# Patient Record
Sex: MALE | Race: WHITE | Employment: OTHER | URBAN - METROPOLITAN AREA
[De-identification: names, ages, dates, MRNs, and addresses within clinical notes are randomized per-mention and may not be internally consistent; named-entity substitution may affect disease eponyms.]

---

## 2017-11-22 DIAGNOSIS — Z12.5 SPECIAL SCREENING FOR MALIGNANT NEOPLASM OF PROSTATE: ICD-10-CM

## 2017-12-05 ENCOUNTER — OFFICE VISIT (OUTPATIENT)
Dept: UROLOGY | Age: 57
End: 2017-12-05
Payer: COMMERCIAL

## 2017-12-05 VITALS
HEIGHT: 68 IN | TEMPERATURE: 98.1 F | HEART RATE: 71 BPM | WEIGHT: 174 LBS | BODY MASS INDEX: 26.37 KG/M2 | DIASTOLIC BLOOD PRESSURE: 66 MMHG | SYSTOLIC BLOOD PRESSURE: 100 MMHG

## 2017-12-05 DIAGNOSIS — Z12.5 ENCOUNTER FOR SCREENING FOR MALIGNANT NEOPLASM OF PROSTATE: ICD-10-CM

## 2017-12-05 DIAGNOSIS — Z80.42 FAMILY HISTORY OF MALIGNANT NEOPLASM OF PROSTATE IN FATHER: Primary | ICD-10-CM

## 2017-12-05 DIAGNOSIS — N52.9 ERECTILE DYSFUNCTION, UNSPECIFIED ERECTILE DYSFUNCTION TYPE: ICD-10-CM

## 2017-12-05 PROCEDURE — 99214 OFFICE O/P EST MOD 30 MIN: CPT | Performed by: UROLOGY

## 2017-12-05 RX ORDER — TADALAFIL 5 MG/1
5 TABLET ORAL PRN
Qty: 30 TABLET | Refills: 3 | Status: CANCELLED | OUTPATIENT
Start: 2017-12-05

## 2017-12-05 ASSESSMENT — ENCOUNTER SYMPTOMS
WHEEZING: 0
ABDOMINAL PAIN: 0
BACK PAIN: 1
COLOR CHANGE: 0
VOMITING: 0
EYE PAIN: 0
NAUSEA: 0
SHORTNESS OF BREATH: 0
COUGH: 0
EYE REDNESS: 1

## 2017-12-05 NOTE — PROGRESS NOTES
°C)     Body mass index is 26.46 kg/m². Patient is a 59 y.o. male in no acute distress and alert and oriented to person, place and time. Physical Exam  Constitutional: Patient in no acute distress. Neuro: Alert and oriented to person, place and time. Psych: Mood normal, affect normal  Lungs: Respiratory effort is normal  Cardiovascular: Warm & Pink  Abdomen: Soft, non-tender, non-distended with no CVA,  No flank tenderness,  Or hepatosplenomegaly   Lymphatics: No palpable lymphadenopathy. Bladder non-tender and not distended. Musculoskeletal: Normal gait and station  Testiscles: Normal, bilaterally  Prostate:  30 grams, smooth, no nodules. Assessment and Plan      1. Family history of malignant neoplasm of prostate in father    2. Erectile dysfunction, unspecified erectile dysfunction type    3. Encounter for screening for malignant neoplasm of prostate           Plan:         Doing well  Sildenafil script provided. PSA/CLIFFORD yearly, given family history of prostate cancer. No Follow-up on file. Prescriptions Ordered:  No orders of the defined types were placed in this encounter. Orders Placed:  No orders of the defined types were placed in this encounter.          Perla Mayo MD

## 2018-12-13 ENCOUNTER — TELEPHONE (OUTPATIENT)
Dept: UROLOGY | Age: 58
End: 2018-12-13

## 2018-12-13 DIAGNOSIS — Z12.5 SCREENING PSA (PROSTATE SPECIFIC ANTIGEN): Primary | ICD-10-CM

## 2019-01-23 ENCOUNTER — OFFICE VISIT (OUTPATIENT)
Dept: ORTHOPEDIC SURGERY | Age: 59
End: 2019-01-23
Payer: COMMERCIAL

## 2019-01-23 VITALS — BODY MASS INDEX: 26.52 KG/M2 | WEIGHT: 175 LBS | HEIGHT: 68 IN

## 2019-01-23 DIAGNOSIS — M25.512 CHRONIC LEFT SHOULDER PAIN: Primary | ICD-10-CM

## 2019-01-23 DIAGNOSIS — G89.29 CHRONIC LEFT SHOULDER PAIN: Primary | ICD-10-CM

## 2019-01-23 DIAGNOSIS — M75.42 IMPINGEMENT SYNDROME OF LEFT SHOULDER: ICD-10-CM

## 2019-01-23 PROCEDURE — 20610 DRAIN/INJ JOINT/BURSA W/O US: CPT | Performed by: ORTHOPAEDIC SURGERY

## 2019-01-23 PROCEDURE — 99203 OFFICE O/P NEW LOW 30 MIN: CPT | Performed by: ORTHOPAEDIC SURGERY

## 2019-01-23 RX ORDER — BUPIVACAINE HYDROCHLORIDE 5 MG/ML
2 INJECTION, SOLUTION PERINEURAL ONCE
Status: COMPLETED | OUTPATIENT
Start: 2019-01-23 | End: 2019-01-23

## 2019-01-23 RX ORDER — LIDOCAINE HYDROCHLORIDE 10 MG/ML
2 INJECTION, SOLUTION EPIDURAL; INFILTRATION; INTRACAUDAL; PERINEURAL ONCE
Status: COMPLETED | OUTPATIENT
Start: 2019-01-23 | End: 2019-01-23

## 2019-01-23 RX ORDER — BETAMETHASONE SODIUM PHOSPHATE AND BETAMETHASONE ACETATE 3; 3 MG/ML; MG/ML
12 INJECTION, SUSPENSION INTRA-ARTICULAR; INTRALESIONAL; INTRAMUSCULAR; SOFT TISSUE ONCE
Status: COMPLETED | OUTPATIENT
Start: 2019-01-23 | End: 2019-01-23

## 2019-01-23 RX ADMIN — BUPIVACAINE HYDROCHLORIDE 10 MG: 5 INJECTION, SOLUTION PERINEURAL at 15:43

## 2019-01-23 RX ADMIN — LIDOCAINE HYDROCHLORIDE 2 ML: 10 INJECTION, SOLUTION EPIDURAL; INFILTRATION; INTRACAUDAL; PERINEURAL at 15:44

## 2019-01-23 RX ADMIN — BETAMETHASONE SODIUM PHOSPHATE AND BETAMETHASONE ACETATE 12 MG: 3; 3 INJECTION, SUSPENSION INTRA-ARTICULAR; INTRALESIONAL; INTRAMUSCULAR; SOFT TISSUE at 15:43

## 2019-02-12 ENCOUNTER — OFFICE VISIT (OUTPATIENT)
Dept: UROLOGY | Age: 59
End: 2019-02-12
Payer: COMMERCIAL

## 2019-02-12 VITALS
SYSTOLIC BLOOD PRESSURE: 122 MMHG | WEIGHT: 180 LBS | TEMPERATURE: 98.2 F | HEART RATE: 72 BPM | BODY MASS INDEX: 27.28 KG/M2 | DIASTOLIC BLOOD PRESSURE: 78 MMHG | HEIGHT: 68 IN

## 2019-02-12 DIAGNOSIS — N52.01 ERECTILE DYSFUNCTION DUE TO ARTERIAL INSUFFICIENCY: Primary | ICD-10-CM

## 2019-02-12 DIAGNOSIS — Z12.5 PROSTATE CANCER SCREENING: ICD-10-CM

## 2019-02-12 PROCEDURE — 99214 OFFICE O/P EST MOD 30 MIN: CPT | Performed by: UROLOGY

## 2019-02-12 RX ORDER — TADALAFIL 20 MG/1
20 TABLET ORAL PRN
Qty: 10 TABLET | Refills: 5 | Status: SHIPPED | OUTPATIENT
Start: 2019-02-12 | End: 2019-08-23

## 2019-02-12 ASSESSMENT — ENCOUNTER SYMPTOMS
ABDOMINAL PAIN: 0
COLOR CHANGE: 0
CONSTIPATION: 0
DIARRHEA: 0
WHEEZING: 0
BACK PAIN: 0
NAUSEA: 0
EYE REDNESS: 0
EYE PAIN: 0
SHORTNESS OF BREATH: 0
COUGH: 0
VOMITING: 0

## 2019-08-11 LAB — PSA, ULTRASENSITIVE: 2.6 NG/ML (ref 0–4)

## 2019-08-23 ENCOUNTER — OFFICE VISIT (OUTPATIENT)
Dept: UROLOGY | Age: 59
End: 2019-08-23
Payer: COMMERCIAL

## 2019-08-23 VITALS
TEMPERATURE: 98.1 F | DIASTOLIC BLOOD PRESSURE: 60 MMHG | HEIGHT: 68 IN | WEIGHT: 179 LBS | SYSTOLIC BLOOD PRESSURE: 112 MMHG | BODY MASS INDEX: 27.13 KG/M2

## 2019-08-23 DIAGNOSIS — N52.01 ERECTILE DYSFUNCTION DUE TO ARTERIAL INSUFFICIENCY: Primary | ICD-10-CM

## 2019-08-23 DIAGNOSIS — R97.20 RISING PSA LEVEL: ICD-10-CM

## 2019-08-23 DIAGNOSIS — Z80.42 FAMILY HISTORY OF PROSTATE CANCER: ICD-10-CM

## 2019-08-23 PROCEDURE — 99214 OFFICE O/P EST MOD 30 MIN: CPT | Performed by: UROLOGY

## 2019-08-23 ASSESSMENT — ENCOUNTER SYMPTOMS
BACK PAIN: 0
NAUSEA: 0
EYE REDNESS: 0
WHEEZING: 0
VOMITING: 0
SHORTNESS OF BREATH: 0
ABDOMINAL PAIN: 0
EYE PAIN: 0
COUGH: 0
COLOR CHANGE: 0

## 2019-08-23 NOTE — PROGRESS NOTES
[facility]  [facility] UROLOGY SPECIALISTS - [facility]  Saqibdavid Fraser [address] [place] [id number]  Dept:  Madhuri Junior [facility] Urology Office Note - Established    Patient:  Sharon Benavides  YOB: 1960  Date: 8/23/2019    The patient is a 62 y.o. male who presents todayfor evaluation of the following problems:   Chief Complaint   Patient presents with    Elevated PSA     6 mo results     Erectile Dysfunction     wants viagra        HPI  He is here in follow up for PSA. He had a PSA of 1.22 2 years ago. His PSA has been rising slightly and is now up to 2.6, so his PSA has doubled in 2 years. His half brother had prostate cancer. He has used Cialis in the past, which worked. He has not used it recently. Summary of old records: N/A    Additional History: N/A    Procedures Today: N/A    Urinalysis today:  No results found for this visit on 08/23/19. Last several PSA's:  No results found for: PSA  Last total testosterone:  No results found for: TESTOSTERONE    AUA Symptom Score (8/23/2019):  INCOMPLETE EMPTYING: How often have you had the sensation of not emptying your bladder?: Not at all  FREQUENCY: How often do you have to urinate less than every two hours?: Not at all  INTERMITTENCY: How often have you found you stopped and started again several times when you urinated?: Not at all  URGENCY: How often have you found it difficult to postpone urination?: Not at all  WEAK STREAM: How often have you had a weak urinary stream?: Not at all  STRAINING: How often have you had to strain to start  urination?: Not at all  NOCTURIA: How many times did you typically get up at night to uriniate?: 1 Time  TOTAL I-PSS SCORE[de-identified] 1  How would you feel if you were to spend the rest of your life with your urinary condition?: Pleased    Last BUN and creatinine:  No results found for: BUN  No results found for: CREATININE    Additional Lab/Culture results: psa has gone up slightly.      Imaging Reviewed

## 2019-08-23 NOTE — LETTER
[facility]  [facility] UROLOGY SPECIALISTS - [address]  Dept: [phone]  Dept Fax: [phone]        8/23/19    Patient: Julien Oates  YOB: 1960    Dear Nancylee Harada,    I had the pleasure of seeing one of your patients, Rere Forte today in the office today. Below are the relevant portions of my assessment and plan of care. IMPRESSION:  1. Erectile dysfunction due to arterial insufficiency    2. Family history of prostate cancer    3. Rising PSA level        PLAN:  Doing well  PSA has gone up again. Will re-check in 6 months. If it goes up again, may need biopsy vs further testing. Cialis script given. No follow-ups on file. Prescriptions Ordered:  No orders of the defined types were placed in this encounter. Orders Placed:  No orders of the defined types were placed in this encounter. Thank you for allowing me to participate in the care of this patient. I will keep you updated on this patient's follow up and I look forward to serving you and your patients again in the future.         Kennedy Stanford MD

## 2020-01-24 ENCOUNTER — OFFICE VISIT (OUTPATIENT)
Dept: UROLOGY | Age: 60
End: 2020-01-24
Payer: COMMERCIAL

## 2020-01-24 ENCOUNTER — HOSPITAL ENCOUNTER (OUTPATIENT)
Age: 60
Setting detail: SPECIMEN
Discharge: HOME | End: 2020-01-24
Payer: COMMERCIAL

## 2020-01-24 VITALS
HEIGHT: 68 IN | BODY MASS INDEX: 27.31 KG/M2 | TEMPERATURE: 98.6 F | WEIGHT: 180.2 LBS | SYSTOLIC BLOOD PRESSURE: 106 MMHG | DIASTOLIC BLOOD PRESSURE: 60 MMHG

## 2020-01-24 PROCEDURE — 99214 OFFICE O/P EST MOD 30 MIN: CPT | Performed by: UROLOGY

## 2020-01-24 RX ORDER — TRAMADOL HYDROCHLORIDE 50 MG/1
TABLET ORAL
COMMUNITY
Start: 2019-12-30 | End: 2021-05-26 | Stop reason: ALTCHOICE

## 2020-01-24 RX ORDER — TADALAFIL 5 MG/1
TABLET ORAL
COMMUNITY
Start: 2019-12-27 | End: 2020-08-05 | Stop reason: SDUPTHER

## 2020-01-24 RX ORDER — ZOLPIDEM TARTRATE 10 MG/1
TABLET ORAL
Status: ON HOLD | COMMUNITY
Start: 2019-12-30 | End: 2020-03-05 | Stop reason: HOSPADM

## 2020-01-24 ASSESSMENT — ENCOUNTER SYMPTOMS
NAUSEA: 0
BACK PAIN: 0
COUGH: 0
WHEEZING: 0
SHORTNESS OF BREATH: 0
EYE PAIN: 0
COLOR CHANGE: 0
VOMITING: 0
EYE REDNESS: 0
ABDOMINAL PAIN: 0

## 2020-01-24 NOTE — PROGRESS NOTES
[facility]  [facility] UROLOGY SPECIALISTS - [facility]  Loyal Magnus [address] [place] [phone]  Dept: 92 Madhuri Junior [facility] Urology Office Note - Established    Patient:  Marcus So  YOB: 1960  Date: 1/24/2020    The patient is a 62 y.o. male who presents todayfor evaluation of the following problems:   Chief Complaint   Patient presents with    Elevated PSA     6mo psa        HPI  He is here for elevated PSA. He had his PSA double from 1.3 to 2.6 6 months ago. His PSA has now doubled again to 5.3. He has never had a prostate biopsy. He has never had a prostate infection. He is voiding well. His half brother had prostate cancer. Summary of old records: N/A    Additional History: N/A    Procedures Today: N/A    Urinalysis today:  No results found for this visit on 01/24/20.   Last several PSA's:  No results found for: PSA  Last total testosterone:  No results found for: TESTOSTERONE    AUA Symptom Score (1/24/2020):  INCOMPLETE EMPTYING: How often have you had the sensation of not emptying your bladder?: Not at all  FREQUENCY: How often do you have to urinate less than every two hours?: Not at all  INTERMITTENCY: How often have you found you stopped and started again several times when you urinated?: Not at all  URGENCY: How often have you found it difficult to postpone urination?: Not at all  WEAK STREAM: How often have you had a weak urinary stream?: Not at all  STRAINING: How often have you had to strain to start  urination?: Not at all  NOCTURIA: How many times did you typically get up at night to uriniate?: 1 Time  TOTAL I-PSS SCORE[de-identified] 1  How would you feel if you were to spend the rest of your life with your urinary condition?: Pleased    Last BUN and creatinine:  No results found for: BUN  No results found for: CREATININE    Additional Lab/Culture results: none    Imaging Reviewed during this Office Visit: none  (results were independently reviewed by physician and

## 2020-01-29 LAB
FLUOROQUINOLONE RESISTANT ORG, CULT: NORMAL
ZZ NTE WITH NAME CLEAN UP: SPECIMEN SOURCE: NORMAL

## 2020-01-29 RX ORDER — CIPROFLOXACIN 500 MG/1
500 TABLET, FILM COATED ORAL 2 TIMES DAILY
Qty: 6 TABLET | Refills: 0 | Status: SHIPPED | OUTPATIENT
Start: 2020-01-29 | End: 2020-02-01

## 2020-02-07 ENCOUNTER — PROCEDURE VISIT (OUTPATIENT)
Dept: UROLOGY | Age: 60
End: 2020-02-07
Payer: COMMERCIAL

## 2020-02-07 ENCOUNTER — HOSPITAL ENCOUNTER (OUTPATIENT)
Age: 60
Setting detail: SPECIMEN
Discharge: HOME | End: 2020-02-07
Payer: COMMERCIAL

## 2020-02-07 VITALS
SYSTOLIC BLOOD PRESSURE: 100 MMHG | WEIGHT: 180.12 LBS | HEIGHT: 68 IN | TEMPERATURE: 98.7 F | BODY MASS INDEX: 27.3 KG/M2 | DIASTOLIC BLOOD PRESSURE: 80 MMHG

## 2020-02-07 PROCEDURE — 55700 PR BIOPSY OF PROSTATE,NEEDLE/PUNCH: CPT | Performed by: UROLOGY

## 2020-02-07 PROCEDURE — 76872 US TRANSRECTAL: CPT | Performed by: UROLOGY

## 2020-02-07 NOTE — PROGRESS NOTES
Elevated PSA:  Patient is here today for history of an elevated PSA. PROSTATE U/S AND BIOPSY  Risks and Benefits discussed with patient prior to procedure. As per my instructions, the patient took an antibiotic 1 hour prior to this procedure. He also had a Fleets enema. Surgeon: Vernon Caban MD   2/7/20  Indications for exam: Elevated PSA  Anesthesia: 1% Lidocaine periprostatic block bilaterally at junction of base of prostate and seminal vesicle. Ultrasound Findings:  Seminal Vesicles: intact bilaterally  Prostate Measurement: 27 grams  Central Zone: Normal echogenic structure  Transitional Zone: Normal echogenic structure  Peripheral Zone: Normal echogenic structure  Bladder: Minimal postvoid residual  Biopsy: Ultrasound guidance used to obtain biopsy cores were taken from each lobe in a sequential fashion. From 6 quadrants all were taken from the right 6 quadrants were taken from the left. All specimens were sent for pathological examination. Biopsy is transrectal with transrectal US done    Postop medications: Cipro 500 mg po bid for 3 more days. Recommendations: Patient has appointment in the office to review results. Postop Prostate Biopsy Instructions:  Patient told to drink plenty of fluids and to take it easy the day of the prostate biopsy. He was encouraged to use sitz baths as needed for relief of rectal discomfort after the biopsy. He was told he may notice blood or a rust color in his semen for several months after the biopsy. He was told to avoid resuming blood thinners or aspirin until the rectal bleeding or visible blood in urine has stopped for at least 48 hours. He should take his antibiotics to completion as prescribed. He was instructed to call our office immediately or to go to the Emergency Room if he experiences a fever over 101, shaking chills or an inability to urinate. Agree with the ROS entered by the MA.

## 2020-02-13 LAB — SURGICAL PATHOLOGY REPORT: NORMAL

## 2020-02-18 ENCOUNTER — OFFICE VISIT (OUTPATIENT)
Dept: UROLOGY | Age: 60
End: 2020-02-18
Payer: COMMERCIAL

## 2020-02-18 VITALS
DIASTOLIC BLOOD PRESSURE: 87 MMHG | HEART RATE: 81 BPM | BODY MASS INDEX: 27.37 KG/M2 | HEIGHT: 68 IN | WEIGHT: 180.6 LBS | SYSTOLIC BLOOD PRESSURE: 122 MMHG | TEMPERATURE: 98 F

## 2020-02-18 PROCEDURE — 99214 OFFICE O/P EST MOD 30 MIN: CPT | Performed by: UROLOGY

## 2020-02-18 ASSESSMENT — ENCOUNTER SYMPTOMS
ABDOMINAL PAIN: 0
DIARRHEA: 0
NAUSEA: 0
EYE PAIN: 0
WHEEZING: 0
EYE REDNESS: 0
CONSTIPATION: 0
COUGH: 0
VOMITING: 0
BACK PAIN: 0
SHORTNESS OF BREATH: 0

## 2020-02-18 NOTE — LETTER
[facility]  [facility] UROLOGY SPECIALISTS - [address]  Dept: [phone]  Dept Fax: [phone]        2/18/20    Patient: Jennifer Parsons  YOB: 1960    Dear Xiomy Goldsmith,    I had the pleasure of seeing one of your patients, Rubia Montanez today in the office today. Below are the relevant portions of my assessment and plan of care. IMPRESSION:  1. Elevated PSA    2. Family history of prostate cancer    3. Prostate cancer [facility])        PLAN:  He was found to have Intermediate risk prostate cancer, Grade group 3. Would recommend treatment. We discussed his options and he would prefer radical prostatectomy. No metastatic evaluation required. Prescriptions Ordered:  No orders of the defined types were placed in this encounter. Orders Placed:  No orders of the defined types were placed in this encounter. Thank you for allowing me to participate in the care of this patient. I will keep you updated on this patient's follow up and I look forward to serving you and your patients again in the future.         Salina Hanna MD

## 2020-02-27 ENCOUNTER — OFFICE VISIT (OUTPATIENT)
Dept: UROLOGY | Age: 60
End: 2020-02-27
Payer: COMMERCIAL

## 2020-02-27 ENCOUNTER — TELEPHONE (OUTPATIENT)
Dept: UROLOGY | Age: 60
End: 2020-02-27

## 2020-02-27 VITALS
HEIGHT: 68 IN | WEIGHT: 178.6 LBS | DIASTOLIC BLOOD PRESSURE: 84 MMHG | BODY MASS INDEX: 27.07 KG/M2 | TEMPERATURE: 98.3 F | SYSTOLIC BLOOD PRESSURE: 118 MMHG

## 2020-02-27 PROCEDURE — 99214 OFFICE O/P EST MOD 30 MIN: CPT | Performed by: UROLOGY

## 2020-02-27 ASSESSMENT — ENCOUNTER SYMPTOMS
EYE REDNESS: 0
DIARRHEA: 0
BACK PAIN: 0
NAUSEA: 0
CONSTIPATION: 0
SHORTNESS OF BREATH: 0
ABDOMINAL PAIN: 0
EYE PAIN: 0
COUGH: 0
VOMITING: 0
WHEEZING: 0

## 2020-02-27 NOTE — PROGRESS NOTES
tenderness,  Or hepatosplenomegaly       Assessment and Plan      1. Prostate cancer ([address]    2. Elevated PSA           Plan:     robo prostate with plnd  Do standard and nodes  Cat 1  Discussed for 27 min  Return for Surgery. Prescriptions Ordered:  No orders of the defined types were placed in this encounter. Orders Placed:  No orders of the defined types were placed in this encounter. Joe Carty MD    Agree with the ROS entered by the MA.

## 2020-02-28 ENCOUNTER — HOSPITAL ENCOUNTER (OUTPATIENT)
Dept: PREADMISSION TESTING | Age: 60
Discharge: HOME | End: 2020-03-03
Payer: COMMERCIAL

## 2020-02-28 VITALS
WEIGHT: 179.01 LBS | BODY MASS INDEX: 27.13 KG/M2 | OXYGEN SATURATION: 98 % | DIASTOLIC BLOOD PRESSURE: 83 MMHG | SYSTOLIC BLOOD PRESSURE: 149 MMHG | HEIGHT: 68 IN | RESPIRATION RATE: 20 BRPM | HEART RATE: 92 BPM | TEMPERATURE: 97.7 F

## 2020-02-28 LAB
ABO/RH: NORMAL
ANION GAP SERPL CALCULATED.3IONS-SCNC: 13 MMOL/L (ref 9–17)
ANTIBODY SCREEN: NEGATIVE
ARM BAND NUMBER: NORMAL
BUN BLDV-MCNC: 18 MG/DL (ref 8–23)
CHLORIDE BLD-SCNC: 104 MMOL/L (ref 98–107)
CO2: 22 MMOL/L (ref 20–31)
CREAT SERPL-MCNC: 0.92 MG/DL (ref 0.7–1.2)
EXPIRATION DATE: NORMAL
GFR AFRICAN AMERICAN: >60 ML/MIN
GFR NON-AFRICAN AMERICAN: >60 ML/MIN
GFR SERPL CREATININE-BSD FRML MDRD: NORMAL ML/MIN/{1.73_M2}
GFR SERPL CREATININE-BSD FRML MDRD: NORMAL ML/MIN/{1.73_M2}
GLUCOSE BLD-MCNC: 142 MG/DL (ref 70–99)
HCT VFR BLD CALC: 42.2 % (ref 40.7–50.3)
HEMOGLOBIN: 13.9 G/DL (ref 13–17)
MCH RBC QN AUTO: 29.4 PG (ref 25.2–33.5)
MCHC RBC AUTO-ENTMCNC: 32.9 G/DL (ref 28.4–34.8)
MCV RBC AUTO: 89.4 FL (ref 82.6–102.9)
NRBC AUTOMATED: 0 PER 100 WBC
PDW BLD-RTO: 12.4 % (ref 11.8–14.4)
PLATELET # BLD: 252 K/UL (ref 138–453)
PMV BLD AUTO: 10.5 FL (ref 8.1–13.5)
POTASSIUM SERPL-SCNC: 3.9 MMOL/L (ref 3.7–5.3)
RBC # BLD: 4.72 M/UL (ref 4.21–5.77)
SODIUM BLD-SCNC: 139 MMOL/L (ref 135–144)
WBC # BLD: 8 K/UL (ref 3.5–11.3)

## 2020-02-28 PROCEDURE — 87086 URINE CULTURE/COLONY COUNT: CPT

## 2020-02-28 PROCEDURE — 85027 COMPLETE CBC AUTOMATED: CPT

## 2020-02-28 PROCEDURE — 86900 BLOOD TYPING SEROLOGIC ABO: CPT

## 2020-02-28 PROCEDURE — 36415 COLL VENOUS BLD VENIPUNCTURE: CPT

## 2020-02-28 PROCEDURE — 80051 ELECTROLYTE PANEL: CPT

## 2020-02-28 PROCEDURE — 86901 BLOOD TYPING SEROLOGIC RH(D): CPT

## 2020-02-28 PROCEDURE — 82947 ASSAY GLUCOSE BLOOD QUANT: CPT

## 2020-02-28 PROCEDURE — 86850 RBC ANTIBODY SCREEN: CPT

## 2020-02-28 PROCEDURE — 84520 ASSAY OF UREA NITROGEN: CPT

## 2020-02-28 PROCEDURE — 82565 ASSAY OF CREATININE: CPT

## 2020-02-28 RX ORDER — SODIUM CHLORIDE, SODIUM LACTATE, POTASSIUM CHLORIDE, CALCIUM CHLORIDE 600; 310; 30; 20 MG/100ML; MG/100ML; MG/100ML; MG/100ML
1000 INJECTION, SOLUTION INTRAVENOUS CONTINUOUS
Status: CANCELLED | OUTPATIENT
Start: 2020-02-28

## 2020-02-28 RX ORDER — OMEPRAZOLE 20 MG/1
20 CAPSULE, DELAYED RELEASE ORAL DAILY
COMMUNITY

## 2020-02-28 NOTE — PROGRESS NOTES
Anesthesia Focused Assessment    STOP-BANG Sleep Apnea Questionnaire    SNORE loudly (heard through closed doors)? No  TIRED, fatigued, sleepy during daytime? No  OBSERVED stopping breathing during sleep? No  High blood PRESSURE being treated? No    BMI over 35? No  AGE over 48? Yes  NECK circumference over 16\"? No  GENDER (male)? Yes             Total 2  High risk 5-8  Intermediate risk 3-4  Low risk 0-2    Obstructive Sleep Apnea: denies  If YES, machine used: no     Type 1 DM:   no  T2DM:  no    Coronary Artery Disease:  no  Hypertension:  no    Active smoker:  no  Drinks Alcohol:  no    Dentition: molar crown    Defib / AICD / Pacemaker: no      Renal Failure/dialysis:  no    Patient was evaluated in PAT & anesthesia guidelines were applied. NPO guidelines, medication instructions and scheduled arrival time were reviewed with patient.     Hx of anesthesia complications:  no  Family hx of anesthesia complications:  no                                                                                                                     Anesthesia contacted:   no  Medical or cardiac clearance ordered: no    LANDON LAWRENCE PA-C  2/28/20  1:20 PM

## 2020-02-28 NOTE — H&P
History and Physical    Pt Name: Enrique Arguelles  MRN: [id number]  YOB: 1960  Date of evaluation: 2/28/2020    SUBJECTIVE:   History of Chief Complaint:    Patient complains of prostate cancer. He has regular PSA levels drawn, family history of prostate cancer in brothers. He says that the PSA had risen, was sent for biopsy. This was positive for cancer, scheduled for prostatectomy. Past Medical History    has a past medical history of Colon polyps, Diverticulitis, Hyperlipidemia, Prostate cancer [facility]), Wears prescription eyeglasses, and Wellness examination. Past Surgical History   has a past surgical history that includes Rotator cuff repair; Prostate Biopsy (N/A); and Colonoscopy (2012, 2017). Medications  Prior to Admission medications    Medication Sig Start Date End Date Taking? Authorizing Provider   omeprazole (PRILOSEC) 20 MG delayed release capsule Take 20 mg by mouth Daily   Yes Historical Provider, MD   sertraline (ZOLOFT) 50 MG tablet Take 50 mg by mouth daily  12/30/19   Historical Provider, MD   tadalafil (CIALIS) 5 MG tablet TAKE 1 TABLET BY MOUTH ONCE DAILY AS NEEDED 12/27/19   Historical Provider, MD   traMADol Leitha Mill) 50 MG tablet  12/30/19   Historical Provider, MD   zolpidem (AMBIEN) 10 MG tablet  12/30/19   Historical Provider, MD   vitamin D 25 MCG (1000 UT) CAPS Take by mouth    Historical Provider, MD   fenofibrate 160 MG tablet Take 160 mg by mouth daily    Historical Provider, MD   b complex vitamins capsule Take 1 capsule by mouth daily    Historical Provider, MD   Ascorbic Acid (VITAMIN C) 500 MG tablet Take 500 mg by mouth daily    Historical Provider, MD   psyllium (METAMUCIL) 0.52 G capsule Take 0.52 g by mouth daily    Historical Provider, MD   aspirin 81 MG tablet Take 81 mg by mouth daily    Historical Provider, MD     Allergies  is allergic to sulfamethoxazole.   Family History  family history includes COPD in his father; Colon Cancer in his mother; Heart Attack in his father; Heart Disease in his father; Prostate Cancer in his brother. Social History   reports that he has never smoked. He has never used smokeless tobacco.   reports no history of alcohol use. reports no history of drug use. Marital Status     OBJECTIVE:   VITALS:  height is 5' 8\" (1.727 m) and weight is 179 lb 0.2 oz (81.2 kg). His temporal temperature is 97.7 °F (36.5 °C). His blood pressure is 149/83 (abnormal) and his pulse is 92. His respiration is 20 and oxygen saturation is 98%. CONSTITUTIONAL:alert & oriented x 3, no acute distress. Very pleasant. SKIN:  Warm and dry, no rashes on exposed areas of skin   HEAD:  Normocephalic, atraumatic   EYES: PERRL. EOMs intact. Wearing glasses. EARS:  Hearing grossly WNL. NOSE:  Nares patent. No rhinorrhea   MOUTH/THROAT:  benign  NECK:supple, no lymphadenopathy  LUNGS: Clear to auscultation bilaterally, no wheezes. CARDIOVASCULAR: Heart sounds are normal.  Regular rate and rhythm without murmur. ABDOMEN: soft, non tender, non distended. EXTREMITIES: no edema bilateral lower extremities. Testing:   EK20  Labs pending: drawn 2020     IMPRESSIONS:   1. Prostate cancer  2.  has a past medical history of Colon polyps, Diverticulitis, Hyperlipidemia, Prostate cancer [facility]), Wears prescription eyeglasses, and Wellness examination.    PLANS:   1. prostatectomy    LANDON LAWRENCE PA-C  Electronically signed 2020 at 2:01 PM

## 2020-02-29 LAB
CULTURE: NO GROWTH
Lab: NORMAL
SPECIMEN DESCRIPTION: NORMAL

## 2020-03-04 ENCOUNTER — HOSPITAL ENCOUNTER (INPATIENT)
Age: 60
LOS: 1 days | Discharge: HOME | DRG: 708 | End: 2020-03-05
Attending: UROLOGY | Admitting: UROLOGY
Payer: COMMERCIAL

## 2020-03-04 ENCOUNTER — ANESTHESIA EVENT (OUTPATIENT)
Dept: OPERATING ROOM | Age: 60
DRG: 708 | End: 2020-03-04
Payer: COMMERCIAL

## 2020-03-04 ENCOUNTER — ANESTHESIA (OUTPATIENT)
Dept: OPERATING ROOM | Age: 60
DRG: 708 | End: 2020-03-04
Payer: COMMERCIAL

## 2020-03-04 VITALS — OXYGEN SATURATION: 100 % | TEMPERATURE: 98.3 F | DIASTOLIC BLOOD PRESSURE: 134 MMHG | SYSTOLIC BLOOD PRESSURE: 150 MMHG

## 2020-03-04 PROBLEM — C61 PROSTATE CANCER (HCC): Status: ACTIVE | Noted: 2020-03-04

## 2020-03-04 LAB
ANION GAP SERPL CALCULATED.3IONS-SCNC: 13 MMOL/L (ref 9–17)
BUN BLDV-MCNC: 13 MG/DL (ref 8–23)
BUN/CREAT BLD: ABNORMAL (ref 9–20)
CALCIUM SERPL-MCNC: 8.6 MG/DL (ref 8.6–10.4)
CHLORIDE BLD-SCNC: 104 MMOL/L (ref 98–107)
CO2: 19 MMOL/L (ref 20–31)
CREAT SERPL-MCNC: 0.84 MG/DL (ref 0.7–1.2)
GFR AFRICAN AMERICAN: >60 ML/MIN
GFR NON-AFRICAN AMERICAN: >60 ML/MIN
GFR SERPL CREATININE-BSD FRML MDRD: ABNORMAL ML/MIN/{1.73_M2}
GFR SERPL CREATININE-BSD FRML MDRD: ABNORMAL ML/MIN/{1.73_M2}
GLUCOSE BLD-MCNC: 177 MG/DL (ref 70–99)
HCT VFR BLD CALC: 39.5 % (ref 40.7–50.3)
HEMOGLOBIN: 13.2 G/DL (ref 13–17)
MCH RBC QN AUTO: 29.9 PG (ref 25.2–33.5)
MCHC RBC AUTO-ENTMCNC: 33.4 G/DL (ref 28.4–34.8)
MCV RBC AUTO: 89.6 FL (ref 82.6–102.9)
NRBC AUTOMATED: 0 PER 100 WBC
PDW BLD-RTO: 12.4 % (ref 11.8–14.4)
PLATELET # BLD: 232 K/UL (ref 138–453)
PMV BLD AUTO: 10.5 FL (ref 8.1–13.5)
POTASSIUM SERPL-SCNC: 3.9 MMOL/L (ref 3.7–5.3)
RBC # BLD: 4.41 M/UL (ref 4.21–5.77)
SODIUM BLD-SCNC: 136 MMOL/L (ref 135–144)
WBC # BLD: 16.4 K/UL (ref 3.5–11.3)

## 2020-03-04 PROCEDURE — 6360000002 HC RX W HCPCS: Performed by: NURSE ANESTHETIST, CERTIFIED REGISTERED

## 2020-03-04 PROCEDURE — 3600000019 HC SURGERY ROBOT ADDTL 15MIN: Performed by: UROLOGY

## 2020-03-04 PROCEDURE — 88307 TISSUE EXAM BY PATHOLOGIST: CPT

## 2020-03-04 PROCEDURE — 6360000002 HC RX W HCPCS: Performed by: PHYSICIAN ASSISTANT

## 2020-03-04 PROCEDURE — 0VT04ZZ RESECTION OF PROSTATE, PERCUTANEOUS ENDOSCOPIC APPROACH: ICD-10-PCS | Performed by: UROLOGY

## 2020-03-04 PROCEDURE — 85027 COMPLETE CBC AUTOMATED: CPT

## 2020-03-04 PROCEDURE — 3700000001 HC ADD 15 MINUTES (ANESTHESIA): Performed by: UROLOGY

## 2020-03-04 PROCEDURE — 88309 TISSUE EXAM BY PATHOLOGIST: CPT

## 2020-03-04 PROCEDURE — 2580000003 HC RX 258: Performed by: NURSE ANESTHETIST, CERTIFIED REGISTERED

## 2020-03-04 PROCEDURE — 80048 BASIC METABOLIC PNL TOTAL CA: CPT

## 2020-03-04 PROCEDURE — 6360000002 HC RX W HCPCS: Performed by: ANESTHESIOLOGY

## 2020-03-04 PROCEDURE — 7100000001 HC PACU RECOVERY - ADDTL 15 MIN: Performed by: UROLOGY

## 2020-03-04 PROCEDURE — 3700000000 HC ANESTHESIA ATTENDED CARE: Performed by: UROLOGY

## 2020-03-04 PROCEDURE — 6370000000 HC RX 637 (ALT 250 FOR IP): Performed by: UROLOGY

## 2020-03-04 PROCEDURE — 07BC4ZZ EXCISION OF PELVIS LYMPHATIC, PERCUTANEOUS ENDOSCOPIC APPROACH: ICD-10-PCS | Performed by: UROLOGY

## 2020-03-04 PROCEDURE — S2900 ROBOTIC SURGICAL SYSTEM: HCPCS | Performed by: UROLOGY

## 2020-03-04 PROCEDURE — 6370000000 HC RX 637 (ALT 250 FOR IP): Performed by: PHYSICIAN ASSISTANT

## 2020-03-04 PROCEDURE — 2709999900 HC NON-CHARGEABLE SUPPLY: Performed by: UROLOGY

## 2020-03-04 PROCEDURE — 87086 URINE CULTURE/COLONY COUNT: CPT

## 2020-03-04 PROCEDURE — 1200000000 HC SEMI PRIVATE

## 2020-03-04 PROCEDURE — 8E0W4CZ ROBOTIC ASSISTED PROCEDURE OF TRUNK REGION, PERCUTANEOUS ENDOSCOPIC APPROACH: ICD-10-PCS | Performed by: UROLOGY

## 2020-03-04 PROCEDURE — 2580000003 HC RX 258: Performed by: UROLOGY

## 2020-03-04 PROCEDURE — 2580000003 HC RX 258: Performed by: ANESTHESIOLOGY

## 2020-03-04 PROCEDURE — 2500000003 HC RX 250 WO HCPCS: Performed by: UROLOGY

## 2020-03-04 PROCEDURE — 7100000000 HC PACU RECOVERY - FIRST 15 MIN: Performed by: UROLOGY

## 2020-03-04 PROCEDURE — 2500000003 HC RX 250 WO HCPCS: Performed by: NURSE ANESTHETIST, CERTIFIED REGISTERED

## 2020-03-04 PROCEDURE — 2580000003 HC RX 258: Performed by: PHYSICIAN ASSISTANT

## 2020-03-04 PROCEDURE — 6370000000 HC RX 637 (ALT 250 FOR IP): Performed by: STUDENT IN AN ORGANIZED HEALTH CARE EDUCATION/TRAINING PROGRAM

## 2020-03-04 PROCEDURE — 3600000009 HC SURGERY ROBOT BASE: Performed by: UROLOGY

## 2020-03-04 PROCEDURE — 2720000010 HC SURG SUPPLY STERILE: Performed by: UROLOGY

## 2020-03-04 RX ORDER — WATER 1000 ML/1000ML
INJECTION, SOLUTION INTRAVENOUS PRN
Status: DISCONTINUED | OUTPATIENT
Start: 2020-03-04 | End: 2020-03-04 | Stop reason: HOSPADM

## 2020-03-04 RX ORDER — ULTRASOUND COUPLING MEDIUM
GEL (GRAM) TOPICAL PRN
Status: DISCONTINUED | OUTPATIENT
Start: 2020-03-04 | End: 2020-03-04 | Stop reason: HOSPADM

## 2020-03-04 RX ORDER — FENTANYL CITRATE 50 UG/ML
INJECTION, SOLUTION INTRAMUSCULAR; INTRAVENOUS PRN
Status: DISCONTINUED | OUTPATIENT
Start: 2020-03-04 | End: 2020-03-04 | Stop reason: SDUPTHER

## 2020-03-04 RX ORDER — PANTOPRAZOLE SODIUM 40 MG/1
40 TABLET, DELAYED RELEASE ORAL
Status: DISCONTINUED | OUTPATIENT
Start: 2020-03-05 | End: 2020-03-05 | Stop reason: HOSPADM

## 2020-03-04 RX ORDER — LABETALOL HYDROCHLORIDE 5 MG/ML
INJECTION, SOLUTION INTRAVENOUS PRN
Status: DISCONTINUED | OUTPATIENT
Start: 2020-03-04 | End: 2020-03-04 | Stop reason: SDUPTHER

## 2020-03-04 RX ORDER — PROPOFOL 10 MG/ML
INJECTION, EMULSION INTRAVENOUS PRN
Status: DISCONTINUED | OUTPATIENT
Start: 2020-03-04 | End: 2020-03-04 | Stop reason: SDUPTHER

## 2020-03-04 RX ORDER — MORPHINE SULFATE 4 MG/ML
4 INJECTION, SOLUTION INTRAMUSCULAR; INTRAVENOUS
Status: DISCONTINUED | OUTPATIENT
Start: 2020-03-04 | End: 2020-03-05

## 2020-03-04 RX ORDER — SODIUM CHLORIDE 0.9 % (FLUSH) 0.9 %
10 SYRINGE (ML) INJECTION PRN
Status: DISCONTINUED | OUTPATIENT
Start: 2020-03-04 | End: 2020-03-05 | Stop reason: HOSPADM

## 2020-03-04 RX ORDER — ZOLPIDEM TARTRATE 5 MG/1
10 TABLET ORAL NIGHTLY
Status: DISCONTINUED | OUTPATIENT
Start: 2020-03-04 | End: 2020-03-05 | Stop reason: HOSPADM

## 2020-03-04 RX ORDER — MAGNESIUM HYDROXIDE 1200 MG/15ML
LIQUID ORAL CONTINUOUS PRN
Status: COMPLETED | OUTPATIENT
Start: 2020-03-04 | End: 2020-03-04

## 2020-03-04 RX ORDER — NEOSTIGMINE METHYLSULFATE 5 MG/5 ML
SYRINGE (ML) INTRAVENOUS PRN
Status: DISCONTINUED | OUTPATIENT
Start: 2020-03-04 | End: 2020-03-04 | Stop reason: SDUPTHER

## 2020-03-04 RX ORDER — ONDANSETRON 2 MG/ML
INJECTION INTRAMUSCULAR; INTRAVENOUS PRN
Status: DISCONTINUED | OUTPATIENT
Start: 2020-03-04 | End: 2020-03-04 | Stop reason: SDUPTHER

## 2020-03-04 RX ORDER — MIDAZOLAM HYDROCHLORIDE 1 MG/ML
INJECTION INTRAMUSCULAR; INTRAVENOUS PRN
Status: DISCONTINUED | OUTPATIENT
Start: 2020-03-04 | End: 2020-03-04 | Stop reason: SDUPTHER

## 2020-03-04 RX ORDER — OXYCODONE HYDROCHLORIDE 5 MG/1
5 TABLET ORAL EVERY 4 HOURS PRN
Status: DISCONTINUED | OUTPATIENT
Start: 2020-03-04 | End: 2020-03-05 | Stop reason: HOSPADM

## 2020-03-04 RX ORDER — KETOROLAC TROMETHAMINE 30 MG/ML
INJECTION, SOLUTION INTRAMUSCULAR; INTRAVENOUS PRN
Status: DISCONTINUED | OUTPATIENT
Start: 2020-03-04 | End: 2020-03-04 | Stop reason: SDUPTHER

## 2020-03-04 RX ORDER — SODIUM CHLORIDE, SODIUM LACTATE, POTASSIUM CHLORIDE, CALCIUM CHLORIDE 600; 310; 30; 20 MG/100ML; MG/100ML; MG/100ML; MG/100ML
1000 INJECTION, SOLUTION INTRAVENOUS CONTINUOUS
Status: DISCONTINUED | OUTPATIENT
Start: 2020-03-04 | End: 2020-03-04

## 2020-03-04 RX ORDER — SODIUM CHLORIDE 9 MG/ML
INJECTION, SOLUTION INTRAVENOUS CONTINUOUS
Status: DISCONTINUED | OUTPATIENT
Start: 2020-03-04 | End: 2020-03-05

## 2020-03-04 RX ORDER — OXYCODONE HYDROCHLORIDE 5 MG/1
10 TABLET ORAL EVERY 4 HOURS PRN
Status: DISCONTINUED | OUTPATIENT
Start: 2020-03-04 | End: 2020-03-05 | Stop reason: HOSPADM

## 2020-03-04 RX ORDER — ACETAMINOPHEN 325 MG/1
650 TABLET ORAL EVERY 6 HOURS
Status: DISCONTINUED | OUTPATIENT
Start: 2020-03-04 | End: 2020-03-05 | Stop reason: HOSPADM

## 2020-03-04 RX ORDER — HEPARIN SODIUM 5000 [USP'U]/ML
5000 INJECTION, SOLUTION INTRAVENOUS; SUBCUTANEOUS ONCE
Status: COMPLETED | OUTPATIENT
Start: 2020-03-04 | End: 2020-03-04

## 2020-03-04 RX ORDER — LIDOCAINE HYDROCHLORIDE 10 MG/ML
INJECTION, SOLUTION EPIDURAL; INFILTRATION; INTRACAUDAL; PERINEURAL PRN
Status: DISCONTINUED | OUTPATIENT
Start: 2020-03-04 | End: 2020-03-04 | Stop reason: SDUPTHER

## 2020-03-04 RX ORDER — DEXAMETHASONE SODIUM PHOSPHATE 10 MG/ML
INJECTION, SOLUTION INTRAMUSCULAR; INTRAVENOUS PRN
Status: DISCONTINUED | OUTPATIENT
Start: 2020-03-04 | End: 2020-03-04 | Stop reason: SDUPTHER

## 2020-03-04 RX ORDER — MIDAZOLAM HYDROCHLORIDE 1 MG/ML
2 INJECTION INTRAMUSCULAR; INTRAVENOUS ONCE
Status: COMPLETED | OUTPATIENT
Start: 2020-03-04 | End: 2020-03-04

## 2020-03-04 RX ORDER — SODIUM CHLORIDE 0.9 % (FLUSH) 0.9 %
10 SYRINGE (ML) INJECTION EVERY 12 HOURS SCHEDULED
Status: DISCONTINUED | OUTPATIENT
Start: 2020-03-04 | End: 2020-03-05 | Stop reason: HOSPADM

## 2020-03-04 RX ORDER — SODIUM CHLORIDE, SODIUM LACTATE, POTASSIUM CHLORIDE, CALCIUM CHLORIDE 600; 310; 30; 20 MG/100ML; MG/100ML; MG/100ML; MG/100ML
INJECTION, SOLUTION INTRAVENOUS CONTINUOUS PRN
Status: DISCONTINUED | OUTPATIENT
Start: 2020-03-04 | End: 2020-03-04 | Stop reason: SDUPTHER

## 2020-03-04 RX ORDER — GLYCOPYRROLATE 1 MG/5 ML
SYRINGE (ML) INTRAVENOUS PRN
Status: DISCONTINUED | OUTPATIENT
Start: 2020-03-04 | End: 2020-03-04 | Stop reason: SDUPTHER

## 2020-03-04 RX ORDER — POLYETHYLENE GLYCOL 3350 17 G/17G
17 POWDER, FOR SOLUTION ORAL DAILY
Status: DISCONTINUED | OUTPATIENT
Start: 2020-03-04 | End: 2020-03-05 | Stop reason: HOSPADM

## 2020-03-04 RX ORDER — MAGNESIUM SULFATE 1 G/100ML
INJECTION INTRAVENOUS PRN
Status: DISCONTINUED | OUTPATIENT
Start: 2020-03-04 | End: 2020-03-04 | Stop reason: SDUPTHER

## 2020-03-04 RX ORDER — MORPHINE SULFATE 2 MG/ML
2 INJECTION, SOLUTION INTRAMUSCULAR; INTRAVENOUS
Status: DISCONTINUED | OUTPATIENT
Start: 2020-03-04 | End: 2020-03-05

## 2020-03-04 RX ORDER — ROCURONIUM BROMIDE 10 MG/ML
INJECTION, SOLUTION INTRAVENOUS PRN
Status: DISCONTINUED | OUTPATIENT
Start: 2020-03-04 | End: 2020-03-04 | Stop reason: SDUPTHER

## 2020-03-04 RX ORDER — OXYBUTYNIN CHLORIDE 5 MG/1
5 TABLET ORAL EVERY 6 HOURS PRN
Status: DISCONTINUED | OUTPATIENT
Start: 2020-03-04 | End: 2020-03-05 | Stop reason: HOSPADM

## 2020-03-04 RX ADMIN — Medication 1 MG: at 15:17

## 2020-03-04 RX ADMIN — HYDROMORPHONE HYDROCHLORIDE 0.25 MG: 1 INJECTION, SOLUTION INTRAMUSCULAR; INTRAVENOUS; SUBCUTANEOUS at 16:25

## 2020-03-04 RX ADMIN — ONDANSETRON 4 MG: 2 INJECTION, SOLUTION INTRAMUSCULAR; INTRAVENOUS at 15:17

## 2020-03-04 RX ADMIN — ROCURONIUM BROMIDE 10 MG: 10 INJECTION INTRAVENOUS at 13:25

## 2020-03-04 RX ADMIN — MAGNESIUM SULFATE HEPTAHYDRATE 1 G: 1 INJECTION, SOLUTION INTRAVENOUS at 13:31

## 2020-03-04 RX ADMIN — HYDROMORPHONE HYDROCHLORIDE 0.25 MG: 1 INJECTION, SOLUTION INTRAMUSCULAR; INTRAVENOUS; SUBCUTANEOUS at 16:51

## 2020-03-04 RX ADMIN — HEPARIN SODIUM 5000 UNITS: 5000 INJECTION INTRAVENOUS; SUBCUTANEOUS at 11:21

## 2020-03-04 RX ADMIN — SODIUM CHLORIDE: 9 INJECTION, SOLUTION INTRAVENOUS at 18:05

## 2020-03-04 RX ADMIN — ZOLPIDEM TARTRATE 10 MG: 5 TABLET ORAL at 21:54

## 2020-03-04 RX ADMIN — HYDROMORPHONE HYDROCHLORIDE 0.5 MG: 1 INJECTION, SOLUTION INTRAMUSCULAR; INTRAVENOUS; SUBCUTANEOUS at 15:56

## 2020-03-04 RX ADMIN — HYDROMORPHONE HYDROCHLORIDE 0.25 MG: 1 INJECTION, SOLUTION INTRAMUSCULAR; INTRAVENOUS; SUBCUTANEOUS at 16:30

## 2020-03-04 RX ADMIN — KETOROLAC TROMETHAMINE 30 MG: 30 INJECTION, SOLUTION INTRAMUSCULAR at 15:08

## 2020-03-04 RX ADMIN — SODIUM CHLORIDE, POTASSIUM CHLORIDE, SODIUM LACTATE AND CALCIUM CHLORIDE 1000 ML: 600; 310; 30; 20 INJECTION, SOLUTION INTRAVENOUS at 11:16

## 2020-03-04 RX ADMIN — FENTANYL CITRATE 100 MCG: 50 INJECTION INTRAMUSCULAR; INTRAVENOUS at 15:39

## 2020-03-04 RX ADMIN — ACETAMINOPHEN 650 MG: 325 TABLET ORAL at 20:16

## 2020-03-04 RX ADMIN — PROPOFOL 150 MG: 10 INJECTION, EMULSION INTRAVENOUS at 12:46

## 2020-03-04 RX ADMIN — SODIUM CHLORIDE, POTASSIUM CHLORIDE, SODIUM LACTATE AND CALCIUM CHLORIDE: 600; 310; 30; 20 INJECTION, SOLUTION INTRAVENOUS at 12:56

## 2020-03-04 RX ADMIN — DEXTROSE MONOHYDRATE 2 G: 50 INJECTION, SOLUTION INTRAVENOUS at 21:54

## 2020-03-04 RX ADMIN — FENTANYL CITRATE 100 MCG: 50 INJECTION INTRAMUSCULAR; INTRAVENOUS at 12:46

## 2020-03-04 RX ADMIN — CEFAZOLIN 2 G: 10 INJECTION, POWDER, FOR SOLUTION INTRAVENOUS; PARENTERAL at 13:07

## 2020-03-04 RX ADMIN — MORPHINE SULFATE 2 MG: 2 INJECTION, SOLUTION INTRAMUSCULAR; INTRAVENOUS at 21:54

## 2020-03-04 RX ADMIN — LIDOCAINE HYDROCHLORIDE 50 MG: 10 INJECTION, SOLUTION EPIDURAL; INFILTRATION; INTRACAUDAL; PERINEURAL at 12:46

## 2020-03-04 RX ADMIN — Medication 5 MG: at 15:17

## 2020-03-04 RX ADMIN — MIDAZOLAM HYDROCHLORIDE 2 MG: 1 INJECTION, SOLUTION INTRAMUSCULAR; INTRAVENOUS at 12:46

## 2020-03-04 RX ADMIN — OXYCODONE HYDROCHLORIDE 10 MG: 5 TABLET ORAL at 20:15

## 2020-03-04 RX ADMIN — HYDROMORPHONE HYDROCHLORIDE 0.25 MG: 1 INJECTION, SOLUTION INTRAMUSCULAR; INTRAVENOUS; SUBCUTANEOUS at 16:12

## 2020-03-04 RX ADMIN — PROPOFOL 50 MG: 10 INJECTION, EMULSION INTRAVENOUS at 15:09

## 2020-03-04 RX ADMIN — DEXAMETHASONE SODIUM PHOSPHATE 10 MG: 10 INJECTION, SOLUTION INTRAMUSCULAR; INTRAVENOUS at 14:00

## 2020-03-04 RX ADMIN — ROCURONIUM BROMIDE 50 MG: 10 INJECTION INTRAVENOUS at 12:46

## 2020-03-04 RX ADMIN — ROCURONIUM BROMIDE 20 MG: 10 INJECTION INTRAVENOUS at 14:09

## 2020-03-04 RX ADMIN — SODIUM CHLORIDE, PRESERVATIVE FREE 10 ML: 5 INJECTION INTRAVENOUS at 22:04

## 2020-03-04 RX ADMIN — HYDROMORPHONE HYDROCHLORIDE 0.5 MG: 1 INJECTION, SOLUTION INTRAMUSCULAR; INTRAVENOUS; SUBCUTANEOUS at 16:02

## 2020-03-04 RX ADMIN — ROCURONIUM BROMIDE 10 MG: 10 INJECTION INTRAVENOUS at 14:54

## 2020-03-04 RX ADMIN — FENTANYL CITRATE 100 MCG: 50 INJECTION INTRAMUSCULAR; INTRAVENOUS at 15:09

## 2020-03-04 RX ADMIN — Medication 5 MG: at 13:41

## 2020-03-04 RX ADMIN — OXYBUTYNIN CHLORIDE 5 MG: 5 TABLET ORAL at 20:15

## 2020-03-04 RX ADMIN — MIDAZOLAM HYDROCHLORIDE 2 MG: 1 INJECTION, SOLUTION INTRAMUSCULAR; INTRAVENOUS at 11:51

## 2020-03-04 RX ADMIN — POLYETHYLENE GLYCOL 3350 17 G: 17 POWDER, FOR SOLUTION ORAL at 21:03

## 2020-03-04 RX ADMIN — SERTRALINE 50 MG: 50 TABLET, FILM COATED ORAL at 21:17

## 2020-03-04 RX ADMIN — MORPHINE SULFATE 2 MG: 2 INJECTION, SOLUTION INTRAMUSCULAR; INTRAVENOUS at 18:47

## 2020-03-04 ASSESSMENT — PULMONARY FUNCTION TESTS
PIF_VALUE: 0
PIF_VALUE: 14
PIF_VALUE: 23
PIF_VALUE: 22
PIF_VALUE: 24
PIF_VALUE: 23
PIF_VALUE: 23
PIF_VALUE: 2
PIF_VALUE: 28
PIF_VALUE: 24
PIF_VALUE: 23
PIF_VALUE: 29
PIF_VALUE: 12
PIF_VALUE: 30
PIF_VALUE: 21
PIF_VALUE: 23
PIF_VALUE: 23
PIF_VALUE: 3
PIF_VALUE: 24
PIF_VALUE: 13
PIF_VALUE: 24
PIF_VALUE: 23
PIF_VALUE: 23
PIF_VALUE: 17
PIF_VALUE: 23
PIF_VALUE: 23
PIF_VALUE: 24
PIF_VALUE: 23
PIF_VALUE: 29
PIF_VALUE: 32
PIF_VALUE: 23
PIF_VALUE: 23
PIF_VALUE: 21
PIF_VALUE: 13
PIF_VALUE: 17
PIF_VALUE: 2
PIF_VALUE: 14
PIF_VALUE: 16
PIF_VALUE: 23
PIF_VALUE: 23
PIF_VALUE: 22
PIF_VALUE: 23
PIF_VALUE: 16
PIF_VALUE: 3
PIF_VALUE: 30
PIF_VALUE: 23
PIF_VALUE: 6
PIF_VALUE: 23
PIF_VALUE: 24
PIF_VALUE: 23
PIF_VALUE: 28
PIF_VALUE: 2
PIF_VALUE: 22
PIF_VALUE: 23
PIF_VALUE: 24
PIF_VALUE: 31
PIF_VALUE: 2
PIF_VALUE: 25
PIF_VALUE: 13
PIF_VALUE: 23
PIF_VALUE: 5
PIF_VALUE: 14
PIF_VALUE: 23
PIF_VALUE: 22
PIF_VALUE: 25
PIF_VALUE: 66
PIF_VALUE: 4
PIF_VALUE: 13
PIF_VALUE: 14
PIF_VALUE: 23
PIF_VALUE: 17
PIF_VALUE: 0
PIF_VALUE: 23
PIF_VALUE: 17
PIF_VALUE: 0
PIF_VALUE: 13
PIF_VALUE: 30
PIF_VALUE: 2
PIF_VALUE: 0
PIF_VALUE: 3
PIF_VALUE: 21
PIF_VALUE: 23
PIF_VALUE: 4
PIF_VALUE: 17
PIF_VALUE: 28
PIF_VALUE: 18
PIF_VALUE: 23
PIF_VALUE: 14
PIF_VALUE: 22
PIF_VALUE: 2
PIF_VALUE: 24
PIF_VALUE: 23
PIF_VALUE: 23
PIF_VALUE: 14
PIF_VALUE: 25
PIF_VALUE: 2
PIF_VALUE: 30
PIF_VALUE: 4
PIF_VALUE: 23
PIF_VALUE: 2
PIF_VALUE: 22
PIF_VALUE: 0
PIF_VALUE: 17
PIF_VALUE: 29
PIF_VALUE: 23
PIF_VALUE: 24
PIF_VALUE: 23
PIF_VALUE: 4
PIF_VALUE: 17
PIF_VALUE: 14
PIF_VALUE: 23
PIF_VALUE: 23
PIF_VALUE: 22
PIF_VALUE: 23
PIF_VALUE: 28
PIF_VALUE: 29
PIF_VALUE: 29
PIF_VALUE: 23
PIF_VALUE: 4
PIF_VALUE: 23
PIF_VALUE: 23
PIF_VALUE: 5
PIF_VALUE: 3
PIF_VALUE: 23
PIF_VALUE: 24
PIF_VALUE: 23
PIF_VALUE: 24
PIF_VALUE: 21
PIF_VALUE: 22
PIF_VALUE: 31
PIF_VALUE: 23
PIF_VALUE: 23
PIF_VALUE: 24
PIF_VALUE: 23
PIF_VALUE: 32
PIF_VALUE: 29
PIF_VALUE: 23
PIF_VALUE: 23
PIF_VALUE: 13
PIF_VALUE: 5
PIF_VALUE: 18
PIF_VALUE: 23
PIF_VALUE: 14
PIF_VALUE: 23
PIF_VALUE: 23
PIF_VALUE: 2
PIF_VALUE: 23
PIF_VALUE: 30
PIF_VALUE: 13
PIF_VALUE: 25
PIF_VALUE: 13
PIF_VALUE: 24
PIF_VALUE: 23
PIF_VALUE: 24
PIF_VALUE: 22
PIF_VALUE: 28
PIF_VALUE: 20
PIF_VALUE: 24
PIF_VALUE: 23
PIF_VALUE: 24
PIF_VALUE: 24
PIF_VALUE: 13
PIF_VALUE: 23
PIF_VALUE: 24
PIF_VALUE: 23
PIF_VALUE: 29
PIF_VALUE: 28
PIF_VALUE: 23

## 2020-03-04 ASSESSMENT — PAIN DESCRIPTION - PROGRESSION
CLINICAL_PROGRESSION: GRADUALLY IMPROVING

## 2020-03-04 ASSESSMENT — PAIN DESCRIPTION - ONSET: ONSET: ON-GOING

## 2020-03-04 ASSESSMENT — PAIN SCALES - GENERAL
PAINLEVEL_OUTOF10: 4
PAINLEVEL_OUTOF10: 7
PAINLEVEL_OUTOF10: 5
PAINLEVEL_OUTOF10: 7
PAINLEVEL_OUTOF10: 7
PAINLEVEL_OUTOF10: 6
PAINLEVEL_OUTOF10: 6
PAINLEVEL_OUTOF10: 5
PAINLEVEL_OUTOF10: 7
PAINLEVEL_OUTOF10: 6
PAINLEVEL_OUTOF10: 5
PAINLEVEL_OUTOF10: 5
PAINLEVEL_OUTOF10: 2

## 2020-03-04 ASSESSMENT — PAIN - FUNCTIONAL ASSESSMENT
PAIN_FUNCTIONAL_ASSESSMENT: 0-10
PAIN_FUNCTIONAL_ASSESSMENT: ACTIVITIES ARE NOT PREVENTED

## 2020-03-04 ASSESSMENT — ENCOUNTER SYMPTOMS
ALLERGIC/IMMUNOLOGIC NEGATIVE: 1
RESPIRATORY NEGATIVE: 1
GASTROINTESTINAL NEGATIVE: 1
SHORTNESS OF BREATH: 0

## 2020-03-04 ASSESSMENT — PAIN DESCRIPTION - LOCATION
LOCATION: ABDOMEN
LOCATION: ABDOMEN

## 2020-03-04 ASSESSMENT — PAIN DESCRIPTION - ORIENTATION
ORIENTATION: LOWER;MID
ORIENTATION: LOWER;MID

## 2020-03-04 ASSESSMENT — PAIN DESCRIPTION - PAIN TYPE
TYPE: ACUTE PAIN
TYPE: ACUTE PAIN

## 2020-03-04 ASSESSMENT — PAIN DESCRIPTION - FREQUENCY: FREQUENCY: INTERMITTENT

## 2020-03-04 ASSESSMENT — PAIN DESCRIPTION - DESCRIPTORS: DESCRIPTORS: ACHING;SHOOTING

## 2020-03-04 NOTE — H&P
Patient's History and Physical from February 27, 2020 was reviewed (cc'd below). Patient examined. There has been no change. Procedure(s):  XI ROBOTIC LAPAROSCOPIC PROSTATECTOMY WITH LYMPH NODE DISSECTION today      robo prostate with plnd  Do standard and nodes  Cat 1  Discussed for 27 min  Return for Surgery. PSA 5.3  Biopsy:  4+3 RLB, 3+3 RM, RLA      Sergio Daniels PA-C  Urology Service   3/4/2020 10:39 AM       [facility] Urology Office Note - Established     Patient:  Lexy Dial  YOB: 1960  Date: 2/27/2020     The patient is a 62 y.o. male who presents todayfor evaluation of the following problems:        Chief Complaint   Patient presents with    Prostate Cancer       discuss surgery          HPI  Here to discuss kee 7 cancer. Has some ED, otherwise healthy. No cardiac hx, and no stent, no belly surgery     Summary of old records: N/A     Additional History: N/A     Procedures Today: N/A     Urinalysis today:  No results found for this visit on 02/27/20.   Last several PSA's:  No results found for: PSA  Last total testosterone:  No results found for: TESTOSTERONE     AUA Symptom Score (2/27/2020):  INCOMPLETE EMPTYING: How often have you had the sensation of not emptying your bladder?: Not at all  FREQUENCY: How often do you have to urinate less than every two hours?: Not at all  INTERMITTENCY: How often have you found you stopped and started again several times when you urinated?: Not at all  URGENCY: How often have you found it difficult to postpone urination?: Not at all  WEAK STREAM: How often have you had a weak urinary stream?: Less than 1 to 5 times  STRAINING: How often have you had to strain to start  urination?: Not at all  NOCTURIA: How many times did you typically get up at night to uriniate?: 1 Time  TOTAL I-PSS SCORE[de-identified] 2  How would you feel if you were to spend the rest of your life with your urinary condition?: Mostly Satisfied     Last BUN and History          Substance and Sexual Activity   Alcohol Use No         REVIEW OF SYSTEMS:  Review of Systems     Physical Exam:           Vitals:     02/27/20 0816   BP: 118/84   Temp: 98.3 °F (36.8 °C)      Body mass index is 27.16 kg/m². Patient is a 62 y.o. male in no acute distress and alert and oriented to person, place and time. Physical Exam  Constitutional: Patient in no acute distress. Neuro: Alert and oriented to person, place and time. Psych: Mood normal, affect normal  Skin: No rash noted  HEENT: Head: Normocephalic andatraumatic  Conjunctivae and EOM are normal. Pupils are equal, round  Nose:Normal  Right External Ear: Normal; Left External Ear: Normal  Mouth: Mucosa Moist  Neck: Supple  Lungs: Respiratory effort is normal  Cardiovascular: Warm & Pink  Abdomen: Soft, non-tender, non-distended with no CVA,  No flank tenderness,  Or hepatosplenomegaly         Assessment and Plan   1.  Prostate cancer ([address]    2. Elevated PSA          Plan:     robo prostate with plnd  Do standard and nodes  Cat 1  Discussed for 27 min  Return for Surgery.     Prescriptions Ordered:    Encounter Medications    No orders of the defined types were placed in this encounter.      Orders Placed:  No orders of the defined types were placed in this encounter.  Chelsy Benjamin MD

## 2020-03-04 NOTE — ANESTHESIA PRE PROCEDURE
Department of Anesthesiology  Preprocedure Note       Name:  Kim Corrigan   Age:  62 y.o.  :  1960                                          MRN:  [id number]         Date:  3/4/2020      Surgeon: Reinier Ragsdale):  Duc Azevedo MD    Procedure: XI ROBOTIC LAPAROSCOPIC PROSTATECTOMY WITH LYMPH NODE DISSECTION (N/A )    Medications prior to admission:   Prior to Admission medications    Medication Sig Start Date End Date Taking? Authorizing Provider   omeprazole (PRILOSEC) 20 MG delayed release capsule Take 20 mg by mouth Daily   Yes Historical Provider, MD   sertraline (ZOLOFT) 50 MG tablet Take 50 mg by mouth daily  19  Yes Historical Provider, MD   zolpidem (AMBIEN) 10 MG tablet  19  Yes Historical Provider, MD   vitamin D 25 MCG (1000 UT) CAPS Take by mouth   Yes Historical Provider, MD   fenofibrate 160 MG tablet Take 160 mg by mouth daily   Yes Historical Provider, MD   psyllium (METAMUCIL) 0.52 G capsule Take 0.52 g by mouth daily   Yes Historical Provider, MD   tadalafil (CIALIS) 5 MG tablet TAKE 1 TABLET BY MOUTH ONCE DAILY AS NEEDED 19   Historical Provider, MD   traMADol Silverio Meka) 50 MG tablet  19   Historical Provider, MD   b complex vitamins capsule Take 1 capsule by mouth daily    Historical Provider, MD   Ascorbic Acid (VITAMIN C) 500 MG tablet Take 500 mg by mouth daily    Historical Provider, MD   aspirin 81 MG tablet Take 81 mg by mouth daily    Historical Provider, MD       Current medications:    Current Facility-Administered Medications   Medication Dose Route Frequency Provider Last Rate Last Dose    lactated ringers infusion 1,000 mL  1,000 mL Intravenous Continuous Clifford Camarena MD 50 mL/hr at 20 1116 1,000 mL at 20 1116    ceFAZolin (ANCEF) 2 g in dextrose 5 % 50 mL IVPB  2 g Intravenous On Call to [id number] [facility],[address], [place]        heparin (porcine) injection 5,000 Units  5,000 Units Subcutaneous Once Marcy Gonzalez PA-C           Allergies:     Allergies Allergen Reactions    Sulfamethoxazole Swelling       Problem List:  There is no problem list on file for this patient. Past Medical History:        Diagnosis Date    Colon polyps     Diverticulitis     Hyperlipidemia     Dr. Bess Erp [facility])     Wears prescription eyeglasses     Wellness examination     Dr. Barnett Stands last seen 12/31/2019       Past Surgical History:        Procedure Laterality Date    COLONOSCOPY  2012, 2017    PROSTATE BIOPSY N/A     2/7/2020    ROTATOR CUFF REPAIR         Social History:    Social History     Tobacco Use    Smoking status: Never Smoker    Smokeless tobacco: Never Used   Substance Use Topics    Alcohol use: No                                Counseling given: Not Answered      Vital Signs (Current):   Vitals:    03/04/20 1103   BP: 108/84   Pulse: 73   Resp: 16   Temp: 96.8 °F (36 °C)   TempSrc: Temporal   SpO2: 96%   Weight: 179 lb (81.2 kg)   Height: 5' 8\" (1.727 m)                                              BP Readings from Last 3 Encounters:   03/04/20 108/84   02/28/20 (!) 149/83   02/27/20 118/84       NPO Status: Time of last liquid consumption: 2200                        Time of last solid consumption: 2200                        Date of last liquid consumption: 03/03/20                        Date of last solid food consumption: 03/02/20    BMI:   Wt Readings from Last 3 Encounters:   03/04/20 179 lb (81.2 kg)   02/28/20 179 lb 0.2 oz (81.2 kg)   02/27/20 178 lb 9.6 oz (81 kg)     Body mass index is 27.22 kg/m².     CBC:   Lab Results   Component Value Date    WBC 8.0 02/28/2020    RBC 4.72 02/28/2020    HGB 13.9 02/28/2020    HCT 42.2 02/28/2020    MCV 89.4 02/28/2020    RDW 12.4 02/28/2020     02/28/2020       CMP:   Lab Results   Component Value Date     02/28/2020    K 3.9 02/28/2020     02/28/2020    CO2 22 02/28/2020    BUN 18 02/28/2020    CREATININE 0.92 02/28/2020    GFRAA >60 02/28/2020    LABGLOM >60 02/28/2020    GLUCOSE 142 02/28/2020       POC Tests: No results for input(s): POCGLU, POCNA, POCK, POCCL, POCBUN, POCHEMO, POCHCT in the last 72 hours. Coags: No results found for: PROTIME, INR, APTT    HCG (If Applicable): No results found for: PREGTESTUR, PREGSERUM, HCG, HCGQUANT     ABGs: No results found for: PHART, PO2ART, RTY5DWP, NFW7LYL, BEART, F0TJGZBX     Type & Screen (If Applicable):  No results found for: [facility]    Anesthesia Evaluation  Patient summary reviewed and Nursing notes reviewed no history of anesthetic complications:   Airway: Mallampati: I  TM distance: >3 FB   Neck ROM: full  Mouth opening: > = 3 FB Dental: normal exam         Pulmonary:normal exam  breath sounds clear to auscultation      (-) COPD, asthma, shortness of breath, recent URI and sleep apnea                           Cardiovascular:Negative CV ROS  Exercise tolerance: good (>4 METS),   (+) hyperlipidemia    (-) hypertension, past MI, CAD, CABG/stent,  angina,  CHF, orthopnea and  WOOTEN      Rhythm: regular  Rate: normal           Beta Blocker:  Not on Beta Blocker         Neuro/Psych:   Negative Neuro/Psych ROS  (+) depression/anxiety    (-) seizures, TIA and CVA           GI/Hepatic/Renal: Neg GI/Hepatic/Renal ROS  (+) GERD:,           Endo/Other: Negative Endo/Other ROS   (+) malignancy/cancer (Prostate). (-) diabetes mellitus               Abdominal:           Vascular: negative vascular ROS. Anesthesia Plan      general     ASA 2     (GA ET  2nd PIV)  Induction: intravenous. MIPS: Postoperative opioids intended and Prophylactic antiemetics administered. Anesthetic plan and risks discussed with patient.       Plan discussed with CRNA and surgical team.                  Isabell Li MD   3/4/2020

## 2020-03-05 VITALS
RESPIRATION RATE: 16 BRPM | OXYGEN SATURATION: 98 % | TEMPERATURE: 98 F | BODY MASS INDEX: 27.13 KG/M2 | SYSTOLIC BLOOD PRESSURE: 106 MMHG | DIASTOLIC BLOOD PRESSURE: 66 MMHG | HEIGHT: 68 IN | HEART RATE: 75 BPM | WEIGHT: 179 LBS

## 2020-03-05 LAB
ANION GAP SERPL CALCULATED.3IONS-SCNC: 12 MMOL/L (ref 9–17)
BUN BLDV-MCNC: 11 MG/DL (ref 8–23)
BUN/CREAT BLD: ABNORMAL (ref 9–20)
CALCIUM SERPL-MCNC: 8.8 MG/DL (ref 8.6–10.4)
CHLORIDE BLD-SCNC: 105 MMOL/L (ref 98–107)
CO2: 21 MMOL/L (ref 20–31)
CREAT SERPL-MCNC: 0.85 MG/DL (ref 0.7–1.2)
CULTURE: NO GROWTH
EKG ATRIAL RATE: 71 BPM
EKG P AXIS: 52 DEGREES
EKG P-R INTERVAL: 152 MS
EKG Q-T INTERVAL: 374 MS
EKG QRS DURATION: 84 MS
EKG QTC CALCULATION (BAZETT): 406 MS
EKG R AXIS: 35 DEGREES
EKG T AXIS: 17 DEGREES
EKG VENTRICULAR RATE: 71 BPM
GFR AFRICAN AMERICAN: >60 ML/MIN
GFR NON-AFRICAN AMERICAN: >60 ML/MIN
GFR SERPL CREATININE-BSD FRML MDRD: ABNORMAL ML/MIN/{1.73_M2}
GFR SERPL CREATININE-BSD FRML MDRD: ABNORMAL ML/MIN/{1.73_M2}
GLUCOSE BLD-MCNC: 130 MG/DL (ref 70–99)
HCT VFR BLD CALC: 37.3 % (ref 40.7–50.3)
HEMOGLOBIN: 11.7 G/DL (ref 13–17)
Lab: NORMAL
MCH RBC QN AUTO: 28.8 PG (ref 25.2–33.5)
MCHC RBC AUTO-ENTMCNC: 31.4 G/DL (ref 28.4–34.8)
MCV RBC AUTO: 91.9 FL (ref 82.6–102.9)
NRBC AUTOMATED: 0 PER 100 WBC
PDW BLD-RTO: 12.6 % (ref 11.8–14.4)
PLATELET # BLD: 212 K/UL (ref 138–453)
PMV BLD AUTO: 10.5 FL (ref 8.1–13.5)
POTASSIUM SERPL-SCNC: 4.1 MMOL/L (ref 3.7–5.3)
RBC # BLD: 4.06 M/UL (ref 4.21–5.77)
SODIUM BLD-SCNC: 138 MMOL/L (ref 135–144)
SPECIMEN DESCRIPTION: NORMAL
WBC # BLD: 10.5 K/UL (ref 3.5–11.3)

## 2020-03-05 PROCEDURE — 2580000003 HC RX 258: Performed by: PHYSICIAN ASSISTANT

## 2020-03-05 PROCEDURE — 6370000000 HC RX 637 (ALT 250 FOR IP): Performed by: PHYSICIAN ASSISTANT

## 2020-03-05 PROCEDURE — 80048 BASIC METABOLIC PNL TOTAL CA: CPT

## 2020-03-05 PROCEDURE — 6370000000 HC RX 637 (ALT 250 FOR IP): Performed by: STUDENT IN AN ORGANIZED HEALTH CARE EDUCATION/TRAINING PROGRAM

## 2020-03-05 PROCEDURE — 6360000002 HC RX W HCPCS: Performed by: PHYSICIAN ASSISTANT

## 2020-03-05 PROCEDURE — 85027 COMPLETE CBC AUTOMATED: CPT

## 2020-03-05 PROCEDURE — 36415 COLL VENOUS BLD VENIPUNCTURE: CPT

## 2020-03-05 RX ORDER — POLYETHYLENE GLYCOL 3350 17 G/17G
17 POWDER, FOR SOLUTION ORAL DAILY PRN
Qty: 510 G | Refills: 0 | Status: SHIPPED | OUTPATIENT
Start: 2020-03-05 | End: 2020-04-04

## 2020-03-05 RX ORDER — KETOROLAC TROMETHAMINE 15 MG/ML
15 INJECTION, SOLUTION INTRAMUSCULAR; INTRAVENOUS EVERY 6 HOURS
Status: DISCONTINUED | OUTPATIENT
Start: 2020-03-05 | End: 2020-03-05 | Stop reason: HOSPADM

## 2020-03-05 RX ORDER — CIPROFLOXACIN 500 MG/1
500 TABLET, FILM COATED ORAL 2 TIMES DAILY
Qty: 6 TABLET | Refills: 0 | Status: SHIPPED | OUTPATIENT
Start: 2020-03-05 | End: 2020-03-08

## 2020-03-05 RX ORDER — OXYCODONE HYDROCHLORIDE AND ACETAMINOPHEN 5; 325 MG/1; MG/1
1 TABLET ORAL EVERY 6 HOURS PRN
Qty: 20 TABLET | Refills: 0 | Status: SHIPPED | OUTPATIENT
Start: 2020-03-05 | End: 2020-03-10

## 2020-03-05 RX ORDER — OXYBUTYNIN CHLORIDE 5 MG/1
5 TABLET ORAL EVERY 6 HOURS PRN
Qty: 21 TABLET | Refills: 0 | Status: SHIPPED | OUTPATIENT
Start: 2020-03-05 | End: 2020-03-12

## 2020-03-05 RX ADMIN — OXYCODONE HYDROCHLORIDE 10 MG: 5 TABLET ORAL at 08:52

## 2020-03-05 RX ADMIN — PANTOPRAZOLE SODIUM 40 MG: 40 TABLET, DELAYED RELEASE ORAL at 05:32

## 2020-03-05 RX ADMIN — OXYCODONE HYDROCHLORIDE 10 MG: 5 TABLET ORAL at 01:00

## 2020-03-05 RX ADMIN — DEXTROSE MONOHYDRATE 2 G: 50 INJECTION, SOLUTION INTRAVENOUS at 04:57

## 2020-03-05 RX ADMIN — OXYCODONE HYDROCHLORIDE 10 MG: 5 TABLET ORAL at 05:01

## 2020-03-05 RX ADMIN — KETOROLAC TROMETHAMINE 15 MG: 15 INJECTION, SOLUTION INTRAMUSCULAR; INTRAVENOUS at 10:03

## 2020-03-05 RX ADMIN — ACETAMINOPHEN 650 MG: 325 TABLET ORAL at 04:57

## 2020-03-05 RX ADMIN — OXYBUTYNIN CHLORIDE 5 MG: 5 TABLET ORAL at 04:57

## 2020-03-05 RX ADMIN — POLYETHYLENE GLYCOL 3350 17 G: 17 POWDER, FOR SOLUTION ORAL at 08:17

## 2020-03-05 RX ADMIN — SERTRALINE 50 MG: 50 TABLET, FILM COATED ORAL at 08:17

## 2020-03-05 RX ADMIN — ACETAMINOPHEN 650 MG: 325 TABLET ORAL at 01:33

## 2020-03-05 ASSESSMENT — PAIN DESCRIPTION - PROGRESSION

## 2020-03-05 ASSESSMENT — PAIN - FUNCTIONAL ASSESSMENT: PAIN_FUNCTIONAL_ASSESSMENT: ACTIVITIES ARE NOT PREVENTED

## 2020-03-05 ASSESSMENT — PAIN DESCRIPTION - FREQUENCY: FREQUENCY: INTERMITTENT

## 2020-03-05 ASSESSMENT — PAIN SCALES - GENERAL
PAINLEVEL_OUTOF10: 7
PAINLEVEL_OUTOF10: 7
PAINLEVEL_OUTOF10: 5
PAINLEVEL_OUTOF10: 6
PAINLEVEL_OUTOF10: 4
PAINLEVEL_OUTOF10: 7

## 2020-03-05 ASSESSMENT — PAIN DESCRIPTION - ORIENTATION: ORIENTATION: LOWER;MID

## 2020-03-05 ASSESSMENT — PAIN DESCRIPTION - ONSET: ONSET: ON-GOING

## 2020-03-05 ASSESSMENT — PAIN DESCRIPTION - LOCATION: LOCATION: ABDOMEN

## 2020-03-05 ASSESSMENT — PAIN DESCRIPTION - DESCRIPTORS: DESCRIPTORS: DISCOMFORT

## 2020-03-05 ASSESSMENT — PAIN DESCRIPTION - PAIN TYPE: TYPE: ACUTE PAIN

## 2020-03-05 NOTE — DISCHARGE SUMMARY
DISCHARGE SUMMARY NOTE:        Patient Identification  PATIENT: Mauro Ogden Che is a 62 y.o. male. MRN: [id number]  :  1960  Admit Date:  3/4/2020  Discharge date:   20                                  Disposition: home  Discharged Condition:  good  Discharge Diagnoses:   Prostate cancer  Patient Active Problem List   Diagnosis    Prostate cancer ([facility] [place] [address]       Consults: none    Surgery: XI ROBOTIC LAPAROSCOPIC PROSTATECTOMY WITH LYMPH NODE DISSECTION     Patient Instructions: Activity: Per discharge instructions  Diet: As tolerated  Patient told to follow up with Miles Haque MD in 1 week(s). Discharge Medications:    Anthonette Pump   Home Medication Instructions [id number]    Printed on:20 1023   Medication Information                      Ascorbic Acid (VITAMIN C) 500 MG tablet  Take 500 mg by mouth daily             aspirin 81 MG tablet  Take 81 mg by mouth daily             b complex vitamins capsule  Take 1 capsule by mouth daily             ciprofloxacin (CIPRO) 500 MG tablet  Take 1 tablet by mouth 2 times daily for 3 days BEGIN TAKING THE DAY PRIOR TO CATHETER REMOVAL             fenofibrate 160 MG tablet  Take 160 mg by mouth daily             omeprazole (PRILOSEC) 20 MG delayed release capsule  Take 20 mg by mouth Daily             oxybutynin (DITROPAN) 5 MG tablet  Take 1 tablet by mouth every 6 hours as needed (bladder spasms)             oxyCODONE-acetaminophen (PERCOCET) 5-325 MG per tablet  Take 1 tablet by mouth every 6 hours as needed for Pain for up to 5 days.  May take 2 tablets if needed             polyethylene glycol (GLYCOLAX) powder  Take 17 g by mouth daily as needed (CONSTIPATION)             psyllium (METAMUCIL) 0.52 G capsule  Take 0.52 g by mouth daily             sertraline (ZOLOFT) 50 MG tablet  Take 50 mg by mouth daily              tadalafil (CIALIS) 5 MG tablet  TAKE 1 TABLET BY MOUTH ONCE DAILY AS NEEDED             traMADol (ULTRAM) 50 MG tablet vitamin D 25 MCG (1000 UT) CAPS  Take by mouth                 Hospital course: Siobhan Grimaldo is an 62 y.o. that underwent a Robotic Assisted Laparoscopic Prostatectomy by Lina Cotto MD  on  3/4/2020. For more details please see the operative report. Admitted post op for pain control. The patient did well in their hospital course. The diet was advanced to regular. The patient had pain controlled with PO meds, tolerated diet, and was ambulating appropriately. Hemoglobin was stable on discharge at 11.7. The patient was afebrile for 24 hrs before discharge. They were given a prescription for antibiotic to take starting the day prior to catheter removal. The patient agrees to discharge, understands discharge instructions, and agrees to follow up for cystogram and calabrese catheter removal as scheduled. Also given oxybutynin for bladder spasms from catheter.        Lionel Delvalle PA-C  10:23 AM 3/5/2020

## 2020-03-05 NOTE — CARE COORDINATION
Case Management Initial Discharge Plan  Mauro Montero,         Met with:patient to discuss discharge plans. Information verified: address, contacts, phone number, , insurance Yes  PCP: Gisele Hollingsworth  Date of last visit: 2019    Insurance Provider: Lizet Keys 150    Discharge Planning    Living Arrangements:  Spouse/Significant Other   Support Systems:  Children, Spouse/Significant Other    Home has 1 story  1 stairs to climb to get into front door    Patient able to perform ADL's:Independent    Current Services (outpatient & in home) none  DME equipment: none  DME provider: n/a      Potential Assistance Needed:  N/A    Patient agreeable to home care: No  [place] of choice provided:  n/a    Prior SNF/Rehab Placement and Facility: none  Agreeable to SNF/Rehab: No  [place] of choice provided: n/a   Evaluation: n/a    Expected Discharge date:  20  Patient expects to be discharged to:  home  Follow Up Appointment: Best Day/ Time: Monday AM    Transportation provider: wife  Transportation arrangements needed for discharge: No    Readmission Risk              Risk of Unplanned Readmission:        6             Does patient have a readmission risk score greater than 14?: No  If yes, follow-up appointment must be made within 7 days of discharge.      Goals of Care: go home and feel better    Discharge Plan: Home independently          Electronically signed by Harriett Ochoa RN on 3/5/20 at 12:01 PM

## 2020-03-05 NOTE — PROGRESS NOTES
CLINICAL PHARMACY NOTE: MEDS TO [address] Select Patient?: No  Total # of Prescriptions Filled: 2   The following medications were delivered to the patient:  · Ciprofloxacin  · Percocet  Total # of Interventions Completed: 0  Time Spent (min): 0    Additional Documentation:

## 2020-03-05 NOTE — PROGRESS NOTES
CLINICAL PHARMACY NOTE: MEDS TO [address] [place] [address] Drive Select Patient?: No  Total # of Prescriptions Filled: 4   The following medications were delivered to the patient:  · Percocet  · Ciprofloxacin  · Oxybutynin   · Polyethylene glycol   Total # of Interventions Completed: 0  Time Spent (min): 0    Additional Documentation:

## 2020-03-05 NOTE — PROGRESS NOTES
Ave Echevarria, Jose Ortiz, Adwoa Trevizo, Bennett Whitt  Urology Progress Note     Subjective:   POD1 from [facility]   Reg diet   Pain well controlled , complains of bladder spasm and leak from around catheter  Denies N/V/F/C/CP/SOB      Patient Vitals for the past 24 hrs:   BP Temp Temp src Pulse Resp SpO2 Height Weight   03/05/20 0510 108/88 97.9 °F (36.6 °C) Oral 68 16 97 % -- --   03/05/20 0029 104/64 98.4 °F (36.9 °C) Oral 77 16 95 % -- --   03/04/20 1945 124/72 98.4 °F (36.9 °C) Oral 99 16 -- -- --   03/04/20 1743 114/78 97.9 °F (36.6 °C) Oral 92 17 97 % 5' 8\" (1.727 m) 179 lb (81.2 kg)   03/04/20 1700 116/70 97.7 °F (36.5 °C) -- 93 21 96 % -- --   03/04/20 1645 118/76 -- -- 80 14 95 % -- --   03/04/20 1632 -- -- -- -- 12 -- -- --   03/04/20 1630 113/76 -- -- 89 14 96 % -- --   03/04/20 1615 118/75 -- -- 93 14 95 % -- --   03/04/20 1600 130/73 -- -- 97 15 98 % -- --   03/04/20 1542 (!) 146/62 98.8 °F (37.1 °C) Temporal 91 13 99 % -- --   03/04/20 1103 108/84 96.8 °F (36 °C) Temporal 73 16 96 % 5' 8\" (1.727 m) 179 lb (81.2 kg)       Intake/Output Summary (Last 24 hours) at 3/5/2020 0740  Last data filed at 3/5/2020 2628  Gross per 24 hour   Intake 2691 ml   Output 1675 ml   Net 1016 ml       Recent Labs     03/04/20  1629 03/05/20  0620   WBC 16.4* 10.5   HGB 13.2 11.7*   HCT 39.5* 37.3*   MCV 89.6 91.9    212     Recent Labs     03/04/20  1629 03/05/20  0620    138   K 3.9 4.1    105   CO2 19* 21   BUN 13 11   CREATININE 0.84 0.85       No results for input(s): COLORU, PHUR, LABCAST, WBCUA, RBCUA, MUCUS, TRICHOMONAS, YEAST, BACTERIA, CLARITYU, SPECGRAV, LEUKOCYTESUR, UROBILINOGEN, BILIRUBINUR, BLOODU in the last 72 hours.     Invalid input(s): NITRATE, GLUCOSEUKETONESUAMORPHOUS    Additional Lab/culture results:    Physical Exam:   AO x 3   Chest - non labored breathing   eripheries warm and well perfised   P/A: soft , appropriately tender   calabrese in situ 1675 ml, clear   Extremities: No edema, calf tenderness        Interval Imaging Findings:    Impression:      61 yo male POD 1 from RALP   Pain well controlled   Ambulating       Plan:   Continue Reg diet  Ambulation   IS 10 times/ hr while awake   Stop IVF and telemetry   Pain control with oral pain meds   SILVER planning       Sparkle Ped  7:40 AM 3/5/2020

## 2020-03-06 ENCOUNTER — TELEPHONE (OUTPATIENT)
Dept: UROLOGY | Age: 60
End: 2020-03-06

## 2020-03-06 LAB — SURGICAL PATHOLOGY REPORT: NORMAL

## 2020-03-06 NOTE — TELEPHONE ENCOUNTER
Patient called in office stating that he is having some leaking at the tip of his penis. Adv patient that sound like a bladder spams unfortunately nothing we can do about those.  Other than that patient states he is doing well

## 2020-03-10 ENCOUNTER — TELEPHONE (OUTPATIENT)
Dept: UROLOGY | Age: 60
End: 2020-03-10

## 2020-03-10 RX ORDER — OXYCODONE HYDROCHLORIDE AND ACETAMINOPHEN 5; 325 MG/1; MG/1
1 TABLET ORAL EVERY 8 HOURS PRN
Qty: 12 TABLET | Refills: 0 | Status: SHIPPED | OUTPATIENT
Start: 2020-03-10 | End: 2020-03-13

## 2020-03-12 ENCOUNTER — TELEPHONE (OUTPATIENT)
Dept: UROLOGY | Age: 60
End: 2020-03-12

## 2020-03-12 ENCOUNTER — HOSPITAL ENCOUNTER (OUTPATIENT)
Dept: GENERAL RADIOLOGY | Age: 60
Discharge: HOME | End: 2020-03-14
Payer: COMMERCIAL

## 2020-03-12 ENCOUNTER — OFFICE VISIT (OUTPATIENT)
Dept: UROLOGY | Age: 60
End: 2020-03-12

## 2020-03-12 VITALS
TEMPERATURE: 97.7 F | DIASTOLIC BLOOD PRESSURE: 70 MMHG | BODY MASS INDEX: 26.64 KG/M2 | WEIGHT: 175.8 LBS | SYSTOLIC BLOOD PRESSURE: 120 MMHG | HEIGHT: 68 IN

## 2020-03-12 PROCEDURE — 6360000004 HC RX CONTRAST MEDICATION: Performed by: UROLOGY

## 2020-03-12 PROCEDURE — 74430 CONTRAST X-RAY BLADDER: CPT

## 2020-03-12 PROCEDURE — 99024 POSTOP FOLLOW-UP VISIT: CPT | Performed by: NURSE PRACTITIONER

## 2020-03-12 RX ORDER — TADALAFIL 5 MG/1
5 TABLET ORAL DAILY PRN
Qty: 30 TABLET | Refills: 5 | Status: SHIPPED | OUTPATIENT
Start: 2020-03-12 | End: 2020-07-09 | Stop reason: SDUPTHER

## 2020-03-12 RX ADMIN — IOPAMIDOL 150 ML: 510 INJECTION, SOLUTION INTRAVASCULAR at 11:19

## 2020-03-12 ASSESSMENT — ENCOUNTER SYMPTOMS
SHORTNESS OF BREATH: 0
NAUSEA: 0
WHEEZING: 0
ABDOMINAL PAIN: 0
COUGH: 0
EYE PAIN: 0
EYE REDNESS: 0
VOMITING: 0
DIARRHEA: 0
CONSTIPATION: 0
BACK PAIN: 0

## 2020-03-12 NOTE — PATIENT INSTRUCTIONS
Kegel exercise: squeeze and hold 3 seconds, relax and release 3 seconds, repeat 10-15 times in a row, at lease 6 times  per day    Quick flicks: after kegel, squeeze, release no hold, 12-15 times in a row, at least 6 times per day    Finish Cipro    Cath removed    Pump ordered    Start Cialis 5 mg daily     Had nausea after dye from cystogram.     Desitin for skin barrier    Call with any questions or concerns

## 2020-03-12 NOTE — PROGRESS NOTES
[facility]  [facility] UROLOGY SPECIALISTS - [facility]  [address] [address]  Dept: [address] [facility] Urology Office Note - Established    Patient:  Navdeep Fong  YOB: 1960  Date: 3/12/2020    The patient is a 62 y.o. male who presents todayfor evaluation of the following problems:   Chief Complaint   Patient presents with    Post-Op Check     s/p prostatectomy       HPI  Here post RRP- 3/4/2020. His cystogram showed no leak, calabrese was not removed at xray. He is having no pain, having BM's. Started antibiotic yesterday. Felt nausea after the dye from cystogram and developed a rash on his Rt groin and Rt leg. Summary of old records: N/A    Additional History: N/A    Procedures Today: N/A    Urinalysis today:  No results found for this visit on 03/12/20. Last several PSA's:  No results found for: PSA  Last total testosterone:  No results found for: TESTOSTERONE    AUA Symptom Score (3/12/2020):                               Last BUN and creatinine:  Lab Results   Component Value Date    BUN 11 03/05/2020     Lab Results   Component Value Date    CREATININE 0.85 03/05/2020       Additional Lab/Culture results:     Surgical Pathology Report 03/04/2020  8:10  [facility]   -- Diagnosis --   1.  PROSTATE GLAND AND SEMINAL VESICLES, RADICAL PROSTATECTOMY:   - PROSTATIC ADENOCARCINOMA, BENJY SCORE 3+4 = 7 (GRADE GROUP 2),    LIMITED TO THE PROSTATE GLAND.   - THE SURGICAL MARGINS ARE NEGATIVE FOR NEOPLASM. - BILATERAL SEMINAL VESICLES ARE NEGATIVE FOR NEOPLASM. 2.  BILATERAL PELVIC LYMPH NODES, RESECTION:   - NEGATIVE FOR MALIGNANCY (0/4).  Demetris Ramirez,   **Electronically Signed Out**         sls/3/6/2020        Imaging Reviewed during this Office Visit:   (results were independently reviewed by physician and radiology report verified)    PAST MEDICAL, FAMILY AND SOCIAL HISTORY UPDATE:  Past Medical History:   Diagnosis Date  Colon polyps     Diverticulitis     Hyperlipidemia     Dr. Karoline Coello [facility])     Wears prescription eyeglasses     Wellness examination     Dr. Janay Ortiz last seen 12/31/2019     Past Surgical History:   Procedure Laterality Date    COLONOSCOPY  2012, 2017    PROSTATE BIOPSY N/A     2/7/2020    PROSTATECTOMY  03/04/2020    XI ROBOTIC LAPAROSCOPIC, WITH LYMPH NODE DISSECTION     PROSTATECTOMY N/A 3/4/2020    XI ROBOTIC LAPAROSCOPIC PROSTATECTOMY WITH LYMPH NODE DISSECTION performed by Lisa Bergeron MD at [address] [place] [address]       Family History   Problem Relation Age of Onset    Prostate Cancer Brother         bladder and prostate    Colon Cancer Mother         lung    Heart Disease Father     Heart Attack Father     COPD Father         COPD     Outpatient Medications Marked as Taking for the 3/12/20 encounter (Office Visit) with MARTINEZ Law - CNP   Medication Sig Dispense Refill    tadalafil (CIALIS) 5 MG tablet Take 1 tablet by mouth daily as needed for Erectile Dysfunction 30 tablet 5    oxyCODONE-acetaminophen (PERCOCET) 5-325 MG per tablet Take 1 tablet by mouth every 8 hours as needed for Pain for up to 3 days. Take 1 tablets every 8  hours as needed for pain.  Try to taper down and use Tylenol in between 12 tablet 0    polyethylene glycol (GLYCOLAX) powder Take 17 g by mouth daily as needed (CONSTIPATION) 510 g 0    omeprazole (PRILOSEC) 20 MG delayed release capsule Take 20 mg by mouth Daily      sertraline (ZOLOFT) 50 MG tablet Take 50 mg by mouth daily       tadalafil (CIALIS) 5 MG tablet TAKE 1 TABLET BY MOUTH ONCE DAILY AS NEEDED      traMADol (ULTRAM) 50 MG tablet       vitamin D 25 MCG (1000 UT) CAPS Take by mouth      fenofibrate 160 MG tablet Take 160 mg by mouth daily      b complex vitamins capsule Take 1 capsule by mouth daily      Ascorbic Acid (VITAMIN C) 500 MG tablet Take 500 mg by mouth daily      psyllium (METAMUCIL) 0.52 G capsule Take 0.52 g by mouth daily      aspirin 81 MG tablet Take 81 mg by mouth daily         Sulfamethoxazole  Social History     Tobacco Use   Smoking Status Never Smoker   Smokeless Tobacco Never Used     (Ifpatient a smoker, smoking cessation counseling offered)    Social History     Substance and Sexual Activity   Alcohol Use No       REVIEW OF SYSTEMS:  Review of Systems    Physical Exam:      Vitals:    03/12/20 1117   BP: 120/70   Temp: 97.7 °F (36.5 °C)     Body mass index is 26.73 kg/m². Patient is a 62 y.o. male in no acute distress and alert and oriented to person, place and time. Physical Exam  Constitutional: Patient in no acute distress. Neuro: Alert and oriented to person, place and time. Psych: Mood normal, affect normal  Skin: red non-raised rash LT groin and LT leg noted  HEENT: Head: Normocephalic andatraumatic  Conjunctivae and EOM are normal. Pupils are equal, round  Nose:Normal  Right External Ear: Normal; Left External Ear: Normal  Mouth: Mucosa Moist  Neck: Supple  Lungs: Respiratory effort is normal  Cardiovascular: Warm & Pink  Abdomen: Soft, non-tender, non-distended. All incisions well approximated. Bladder non-tender and not distended. Musculoskeletal: Normal gait and station      Assessment and Plan      1. Erectile dysfunction due to arterial insufficiency    2. S/P prostatectomy    3. Prostate cancer ([facility] [address]           Plan:      Kegel exercise: squeeze and hold 3 seconds, relax and release 3 seconds, repeat 10-15 times in a row, at lease 6 times  per day    Quick flicks: after kegel, squeeze, release no hold, 12-15 times in a row, at least 6 times per day    Finish Cipro    Cath removed    Pump ordered    Start Cialis 5 mg daily     Had nausea after dye from cystogram.     Desitin for skin barrier    Call with any questions or concerns          MARTINEZ Martines - CNP    reviewed and Agree with the ROS entered by the MA.

## 2020-03-12 NOTE — PROGRESS NOTES
Review of Systems   Constitutional: Negative for appetite change, chills and fatigue. Eyes: Negative for pain, redness and visual disturbance. Respiratory: Negative for cough, shortness of breath and wheezing. Cardiovascular: Negative for chest pain and leg swelling. Gastrointestinal: Negative for abdominal pain, constipation, diarrhea, nausea and vomiting. Genitourinary: Negative for difficulty urinating, dysuria, flank pain, frequency, hematuria and urgency. Has calabrese catheter    Musculoskeletal: Negative for back pain, joint swelling and myalgias. Skin: Negative for rash and wound. Neurological: Negative for dizziness, weakness and numbness. Hematological: Does not bruise/bleed easily.

## 2020-03-12 NOTE — LETTER
[facility]  [facility] UROLOGY SPECIALISTS - [place]  [place] [address]  Dept: [phone]  Dept Fax: [phone]        3/12/20    Patient: Anette Maldonado  YOB: 1960    Dear Rishabh Kamara,    I had the pleasure of seeing one of your patients, Charly Dominguez today in the office today. Below are the relevant portions of my assessment and plan of care. IMPRESSION:  1. Erectile dysfunction due to arterial insufficiency    2. S/P prostatectomy    3. Prostate cancer ([facility] [address]        PLAN:  Kegel exercise: squeeze and hold 3 seconds, relax and release 3 seconds, repeat 10-15 times in a row, at lease 6 times  per day    Quick flicks: after kegel, squeeze, release no hold, 12-15 times in a row, at least 6 times per day    Finish Cipro    Cath removed    Pump ordered    Start Cialis 5 mg daily     Had nausea after dye from cystogram.     Desitin for skin barrier    Call with any questions or concerns       Thank you for allowing me to participate in the care of this patient. I will keep you updated on this patient's follow up and I look forward to serving you and your patients again in the future.     MARTINEZ Lyons - CNP

## 2020-03-17 ENCOUNTER — TELEPHONE (OUTPATIENT)
Dept: UROLOGY | Age: 60
End: 2020-03-17

## 2020-03-24 ENCOUNTER — TELEPHONE (OUTPATIENT)
Dept: UROLOGY | Age: 60
End: 2020-03-24

## 2020-03-24 NOTE — TELEPHONE ENCOUNTER
Thank you for helping him, I will also call him tomorrow and do a phone visit for further reassurance.

## 2020-03-25 NOTE — TELEPHONE ENCOUNTER
Discussed kegel and quick flick- reviewed technique- had been holding for 10 sec-- asked pt to hold for only 3 and release for 3, then do quick flicks- squeeze/release no hold. verbalized understanding. Will call with additional questions.

## 2020-04-06 ENCOUNTER — TELEPHONE (OUTPATIENT)
Dept: UROLOGY | Age: 60
End: 2020-04-06

## 2020-04-06 NOTE — TELEPHONE ENCOUNTER
He passed a small clot today , I advised for him to increase his water when that happens. He is also concerned that he is stioll leaking I advised for him to keep giving it time and to keep doing the exercises that [place] discussed with him. He verbalized understanding .  I also moved his appointment up, He is supposed to go back to work on the [employer] and I walked him through the virtual visit

## 2020-04-07 ENCOUNTER — TELEPHONE (OUTPATIENT)
Dept: UROLOGY | Age: 60
End: 2020-04-07

## 2020-04-08 ENCOUNTER — TELEPHONE (OUTPATIENT)
Dept: UROLOGY | Age: 60
End: 2020-04-08

## 2020-04-08 ENCOUNTER — VIRTUAL VISIT (OUTPATIENT)
Dept: UROLOGY | Age: 60
End: 2020-04-08

## 2020-04-08 PROCEDURE — 99024 POSTOP FOLLOW-UP VISIT: CPT | Performed by: UROLOGY

## 2020-04-08 ASSESSMENT — ENCOUNTER SYMPTOMS
SHORTNESS OF BREATH: 0
BACK PAIN: 0
NAUSEA: 0
VOMITING: 0
WHEEZING: 0
EYE REDNESS: 0
EYE PAIN: 0
CONSTIPATION: 0
ABDOMINAL PAIN: 0
COUGH: 0
DIARRHEA: 0

## 2020-04-09 NOTE — PROGRESS NOTES
Joseline Sutherland is a 62 y.o. male being evaluated by a Virtual Visit (video visit) encounter to address concerns as mentioned above. A caregiver was present when appropriate. Due to this being a TeleHealth encounter (During [facility] public health emergency), evaluation of the following organ systems was limited: Vitals/Constitutional/EENT/Resp/CV/GI//MS/Neuro/Skin/Heme-Lymph-Imm. Pursuant to the emergency declaration under the [address] [place] [address] and the [employer] Act, this Virtual Visit was conducted with patient's (and/or legal guardian's) consent, to reduce the patient's risk of exposure to COVID-19 and provide necessary medical care. The patient (and/or legal guardian) has also been advised to contact this office for worsening conditions or problems, and seek emergency medical treatment and/or call 911 if deemed necessary. Services were provided through a video synchronous discussion virtually to substitute for in-person clinic visit. Patient and provider were located at their individual homes. --April Mcgowan on 4/9/2020 at 2:05 PM    An electronic signature was used to authenticate this note. Please see below message FYI.

## 2020-05-18 ENCOUNTER — OFFICE VISIT (OUTPATIENT)
Dept: PRIMARY CARE CLINIC | Age: 60
End: 2020-05-18
Payer: COMMERCIAL

## 2020-05-18 ENCOUNTER — HOSPITAL ENCOUNTER (OUTPATIENT)
Age: 60
Setting detail: SPECIMEN
Discharge: HOME | End: 2020-05-18
Payer: COMMERCIAL

## 2020-05-18 VITALS
HEART RATE: 71 BPM | BODY MASS INDEX: 25.01 KG/M2 | TEMPERATURE: 98.6 F | OXYGEN SATURATION: 98 % | HEIGHT: 68 IN | WEIGHT: 165 LBS

## 2020-05-18 PROCEDURE — 99203 OFFICE O/P NEW LOW 30 MIN: CPT | Performed by: NURSE PRACTITIONER

## 2020-05-18 ASSESSMENT — ENCOUNTER SYMPTOMS
NAUSEA: 0
ABDOMINAL PAIN: 0
CHEST TIGHTNESS: 0
COUGH: 1
SORE THROAT: 1
SHORTNESS OF BREATH: 0
EYE REDNESS: 0
VOMITING: 0
RHINORRHEA: 0
WHEEZING: 0
DIARRHEA: 0

## 2020-05-18 NOTE — PROGRESS NOTES
General: No signs of injury. Skin:     General: Skin is warm and dry. Coloration: Skin is not jaundiced or pale. Findings: No erythema or rash. Neurological:      General: No focal deficit present. Mental Status: He is alert and oriented to person, place, and time. Psychiatric:         Mood and Affect: Mood normal.         Behavior: Behavior normal.       Pulse 71   Temp 98.6 °F (37 °C) (Temporal)   Ht 5' 8\" (1.727 m)   Wt 165 lb (74.8 kg)   SpO2 98%   BMI 25.09 kg/m²     Assessment:          Diagnosis Orders   1. Suspected COVID-19 virus infection  COVID-19 Ambulatory   2. Exposure to COVID-19 virus  COVID-19 Ambulatory       Plan: You were tested for COVID today. We will call you with results when they are available. If you have not heard from us in 7 days, please call our office. Increase fluids- stay hydrated  Good handwashing  Supportive care as discussed    If having symptoms- you need to be fever free for 72 hours, other symptoms resolved and at least 7 days passed since first symptom appeared before discontinuing  quarantine- CDC guidelines  tylenol as directed as needed over the counter if able to take  go to the ER for chest pain, short of breath, hard time breathing, persistent vomiting, feeling weaker or dehydrated, any worsening, change or concern  Follow up with primary care for re evaluation  [place] covid 19 call center - 7-139-8-[facility] (1-)  Another good resource for information is coronavirus. [place].gov    Patient was evaluated [place] today during this pandemic covid 19 situation. Return for go to the ER for worsening, change or concern, follow up with primary care in 7 days. Patient given educational materials - see patientinstructions. Discussed use, benefit, and side effects of prescribed medications. All patient questions answered. Pt voiced understanding.     Electronically signed by MARTINEZ Mcfarland 5/18/2020 at 9:40 AM

## 2020-05-18 NOTE — LETTER
[address]  [address] [place] [address] [address] [facility] [address]  Phone: [phone]  Fax: [phone]    MARTINEZ Connelly CNP        May 18, 2020     Patient: Rafiq Garnica   YOB: 1960   Date of Visit: 5/18/2020       To Whom It May Concern: It is my medical opinion that [address]  Should be excused from work. Upon return, we recommend utilizing the cdc guidelines -You have had no fever for at least 72 hours (that is three full days of no fever without the use medicine that reduces fevers)  -AND other symptoms have improved (for example, when your cough or shortness of breath have improved)  -AND at least 10 days have passed since your symptoms first appeared. If you have any questions or concerns, please don't hesitate to call.     Sincerely,          MARTINEZ Connelly CNP

## 2020-05-18 NOTE — PATIENT INSTRUCTIONS
should self-quarantine for 14 days from the last  date of exposure to confirmed or suspected COVID-19. Your healthcare provider and public health staff will evaluate whether you can be cared for at home. If it is determined that you do not need to be hospitalized and can be isolated at home, you will be monitored by staff from your health department. You should follow the prevention steps below until a healthcare provider or local or state health department says you can return to your normal activities. Stay home except to get medical care  People who are mildly ill with COVID-19 are able to isolate at home during their illness. You should restrict activities outside your home, except for getting medical care. Do not go to work, school, or public areas. Avoid using public transportation, ride-sharing, or taxis. Separate yourself from other people and animals in your home  People: As much as possible, you should stay in a specific room and away from other people in your home. Also, you should use a separate bathroom, if available. Animals: You should restrict contact with pets and other animals while you are sick with COVID-19, just like you would around other people. Although there have not been reports of pets or other animals becoming sick with COVID-19, it is still recommended that people sick with COVID-19 limit contact with animals until more information is known about the virus. When possible, have another member of your household care for your animals while you are sick. If you are sick with COVID-19, avoid contact with your pet, including petting, snuggling, being kissed or licked, and sharing food. If you must care for your pet or be around animals while you are sick, wash your hands before and after you interact with pets and wear a facemask. Call ahead before visiting your doctor  If you have a medical appointment, call the healthcare provider and tell them that you have or may have COVID-19.  This instructions. Labels contain instructions for safe and effective use of the cleaning product including precautions you should take when applying the product, such as wearing gloves and making sure you have good ventilation during use of the product. Monitor your symptoms  Seek prompt medical attention if your illness is worsening (e.g., difficulty breathing). Before seeking care, call your healthcare provider and tell them that you have, or are being evaluated for, COVID-19. Put on a facemask before you enter the facility. These steps will help the healthcare providers office to keep other people in the office or waiting room from getting infected or exposed. Persons who are placed under active monitoring or facilitated self-monitoring should follow instructions provided by their local health department or occupational health professionals, as appropriate. When working with your local health department check their available hours. If you have a medical emergency and need to call 911, notify the dispatch personnel that you have, or are being evaluated for COVID-19. If possible, put on a facemask before emergency medical services arrive. Discontinuing home isolation  Patients with confirmed COVID-19 should remain under home isolation precautions until the risk of secondary transmission to others is thought to be low. The decision to discontinue home isolation precautions should be made on a case-by-case basis, in consultation with your physician and the health department. Please do NOT make this decision on your own. If your results of the COVID-19 test is NEGATIVE -     The patient may stop isolation, in consultation with your health care provider, typically when: Your healthcare provider has determined that the cause of the illness is NOT COVID-19 and approves your return to work.   OR  Ten (10) days have passed since onset of symptoms AND three days (72 hours) have passed with no fever without taking medication (like Tylenol) to reduce fever,  respiratory symptoms have resolved and you have been evaluated by your health care provider. Please follow up with your physician for evaluation about this. The following websites are the best places for up to date information on this fluid situation. https://coronavirus. [place].gov/wps/portal/gov/covid-19/home/local-health-districts-and-providers/guidance-for-covid-19-exposure-management    Preventing the Spread of Coronavirus Disease 2019 in Homes and Residential Communities     For the most recent information go to [email or web address]  https://coronavirus. [place].gov/wps/portal/gov/covid-19/home/local-health-districts-and-providers/guidance-for-covid-19-exposure-management

## 2020-05-19 ENCOUNTER — CARE COORDINATION (OUTPATIENT)
Dept: CARE COORDINATION | Age: 60
End: 2020-05-19

## 2020-05-20 ENCOUNTER — CARE COORDINATION (OUTPATIENT)
Dept: CARE COORDINATION | Age: 60
End: 2020-05-20

## 2020-05-21 ENCOUNTER — CARE COORDINATION (OUTPATIENT)
Dept: CARE COORDINATION | Age: 60
End: 2020-05-21

## 2020-05-21 LAB — SARS-COV-2, NAA: NOT DETECTED

## 2020-06-01 ENCOUNTER — TELEPHONE (OUTPATIENT)
Dept: UROLOGY | Age: 60
End: 2020-06-01

## 2020-06-01 NOTE — TELEPHONE ENCOUNTER
Patient called in office stating that he is having a lot of incontinence since prostatectomy. Patient states that he isnt due back to see Dr Ruben Matthews until July but his incontinence is wearing on him. Patient states that he stands a lot at work and that is when is leaks the most. Patient states that he cant control his leaking while working. Patient states that he is also having some issues with his bowels to if he has to go he cant hold it. Patient states his bowel issues started after surgery as well. Patient goes through about 3-4 diapers a day and changes the additional pad in the diaper roughly 3 times before he changes out the entire diaper. Patient is wondering if he can start treatment sooner or if there is anything that he can take or do that can help him until he can see Dr Ruben Matthews in July. Adv patient I will speak with Oral Lynn tomorrow when she comes in and will contact back with advice.

## 2020-06-17 ENCOUNTER — TELEPHONE (OUTPATIENT)
Dept: UROLOGY | Age: 60
End: 2020-06-17

## 2020-06-18 ENCOUNTER — PROCEDURE VISIT (OUTPATIENT)
Dept: UROLOGY | Age: 60
End: 2020-06-18
Payer: COMMERCIAL

## 2020-06-18 VITALS — TEMPERATURE: 98.1 F

## 2020-06-18 PROCEDURE — 97032 APPL MODALITY 1+ESTIM EA 15: CPT | Performed by: NURSE PRACTITIONER

## 2020-06-18 PROCEDURE — 99214 OFFICE O/P EST MOD 30 MIN: CPT | Performed by: NURSE PRACTITIONER

## 2020-06-18 PROCEDURE — 51784 ANAL/URINARY MUSCLE STUDY: CPT | Performed by: NURSE PRACTITIONER

## 2020-06-18 PROCEDURE — 97750 PHYSICAL PERFORMANCE TEST: CPT | Performed by: NURSE PRACTITIONER

## 2020-06-24 LAB — PSA, ULTRASENSITIVE: <0.01 NG/ML (ref 0–4)

## 2020-06-25 ENCOUNTER — PROCEDURE VISIT (OUTPATIENT)
Dept: UROLOGY | Age: 60
End: 2020-06-25
Payer: COMMERCIAL

## 2020-06-25 VITALS — TEMPERATURE: 98.3 F

## 2020-06-25 PROCEDURE — 51784 ANAL/URINARY MUSCLE STUDY: CPT | Performed by: NURSE PRACTITIONER

## 2020-06-25 PROCEDURE — 97032 APPL MODALITY 1+ESTIM EA 15: CPT | Performed by: NURSE PRACTITIONER

## 2020-06-25 PROCEDURE — 97750 PHYSICAL PERFORMANCE TEST: CPT | Performed by: NURSE PRACTITIONER

## 2020-07-01 NOTE — PROGRESS NOTES
Here for PFT 2 SP RRP. He is uisng 1 pull up and 4 pads per day. He noticed since last week he can void some standing instead of sitting. His bowels are alos becoming less urgent. Stress incontinence with coughing,  Sneezing,  and lifting.

## 2020-07-02 ENCOUNTER — PROCEDURE VISIT (OUTPATIENT)
Dept: UROLOGY | Age: 60
End: 2020-07-02
Payer: COMMERCIAL

## 2020-07-02 VITALS — TEMPERATURE: 98.3 F

## 2020-07-02 PROCEDURE — 97032 APPL MODALITY 1+ESTIM EA 15: CPT | Performed by: NURSE PRACTITIONER

## 2020-07-02 PROCEDURE — 97750 PHYSICAL PERFORMANCE TEST: CPT | Performed by: NURSE PRACTITIONER

## 2020-07-02 PROCEDURE — 51784 ANAL/URINARY MUSCLE STUDY: CPT | Performed by: NURSE PRACTITIONER

## 2020-07-02 RX ORDER — ZOLPIDEM TARTRATE 10 MG/1
TABLET ORAL
COMMUNITY
Start: 2020-06-27 | End: 2022-06-06

## 2020-07-02 RX ORDER — LORATADINE 10 MG/1
10 TABLET ORAL DAILY
COMMUNITY
Start: 2020-06-27

## 2020-07-02 NOTE — PROGRESS NOTES
Pad was alsmost dry overnight. Has less leak with standing and kegel. Can walk directly to BR more often with less leak if he can get in right away. Do kegels and quick flicks.

## 2020-07-08 ENCOUNTER — PROCEDURE VISIT (OUTPATIENT)
Dept: UROLOGY | Age: 60
End: 2020-07-08
Payer: COMMERCIAL

## 2020-07-08 VITALS — TEMPERATURE: 98.1 F

## 2020-07-08 PROCEDURE — 51784 ANAL/URINARY MUSCLE STUDY: CPT | Performed by: NURSE PRACTITIONER

## 2020-07-08 PROCEDURE — 97032 APPL MODALITY 1+ESTIM EA 15: CPT | Performed by: NURSE PRACTITIONER

## 2020-07-08 PROCEDURE — 97750 PHYSICAL PERFORMANCE TEST: CPT | Performed by: NURSE PRACTITIONER

## 2020-07-08 NOTE — PROGRESS NOTES
PFT #4: 20% improvement. Ed: using pump. Used ring over the weekend with good success. \"Different\" but it worked okay. Will have him take Cialis 20 mg 45 min prior to pump if he wants to have intercourse. Has some morning filling, soft but noted.      1 week PFT

## 2020-07-09 ENCOUNTER — OFFICE VISIT (OUTPATIENT)
Dept: UROLOGY | Age: 60
End: 2020-07-09
Payer: COMMERCIAL

## 2020-07-09 VITALS — TEMPERATURE: 98.3 F

## 2020-07-09 PROCEDURE — 99214 OFFICE O/P EST MOD 30 MIN: CPT | Performed by: UROLOGY

## 2020-07-09 RX ORDER — TADALAFIL 5 MG/1
TABLET ORAL
Qty: 45 TABLET | Refills: 11 | Status: SHIPPED | OUTPATIENT
Start: 2020-07-09 | End: 2021-05-26

## 2020-07-09 ASSESSMENT — ENCOUNTER SYMPTOMS
DIARRHEA: 0
EYE REDNESS: 0
WHEEZING: 0
SHORTNESS OF BREATH: 0
COUGH: 0
VOMITING: 0
EYE PAIN: 0
NAUSEA: 0
BACK PAIN: 0
CONSTIPATION: 0
ABDOMINAL PAIN: 0

## 2020-07-09 NOTE — PROGRESS NOTES
independently reviewed by physician and radiology report verified)    PAST MEDICAL, FAMILY AND SOCIAL HISTORY UPDATE:  Past Medical History:   Diagnosis Date    Colon polyps     Diverticulitis     Hyperlipidemia     Dr. Tiffany Davis [facility])     Wears prescription eyeglasses     Wellness examination     Dr. Mitul Washington last seen 12/31/2019     Past Surgical History:   Procedure Laterality Date    COLONOSCOPY  2012, 2017    PROSTATE BIOPSY N/A     2/7/2020    PROSTATECTOMY  03/04/2020    XI ROBOTIC LAPAROSCOPIC, WITH LYMPH NODE DISSECTION     PROSTATECTOMY N/A 3/4/2020    XI ROBOTIC LAPAROSCOPIC PROSTATECTOMY WITH LYMPH NODE DISSECTION performed by Argenis Boston MD at [address]       Family History   Problem Relation Age of Onset    Prostate Cancer Brother         bladder and prostate    Colon Cancer Mother         lung    Heart Disease Father     Heart Attack Father     COPD Father         COPD     Outpatient Medications Marked as Taking for the 7/9/20 encounter (Office Visit) with Argenis Boston MD   Medication Sig Dispense Refill    tadalafil (CIALIS) 5 MG tablet 1 daily,  45 min Prior to activity take 20 mg for that day.  45 tablet 11    loratadine (CLARITIN) 10 MG tablet       zolpidem (AMBIEN) 10 MG tablet       omeprazole (PRILOSEC) 20 MG delayed release capsule Take 20 mg by mouth Daily      sertraline (ZOLOFT) 50 MG tablet Take 50 mg by mouth daily       tadalafil (CIALIS) 5 MG tablet TAKE 1 TABLET BY MOUTH ONCE DAILY AS NEEDED      traMADol (ULTRAM) 50 MG tablet       vitamin D 25 MCG (1000 UT) CAPS Take by mouth      fenofibrate 160 MG tablet Take 160 mg by mouth daily      b complex vitamins capsule Take 1 capsule by mouth daily      Ascorbic Acid (VITAMIN C) 500 MG tablet Take 500 mg by mouth daily      psyllium (METAMUCIL) 0.52 G capsule Take 0.52 g by mouth daily      aspirin 81 MG tablet Take 81 mg by mouth daily         Dye [iodides]; Sulfamethoxazole; and Ciprofloxacin  Social History     Tobacco Use   Smoking Status Never Smoker   Smokeless Tobacco Never Used     (Ifpatient a smoker, smoking cessation counseling offered)    Social History     Substance and Sexual Activity   Alcohol Use No       REVIEW OF SYSTEMS:  Review of Systems    Physical Exam:      Vitals:    20 0900   Temp: 98.3 °F (36.8 °C)     There is no height or weight on file to calculate BMI. Patient is a 62 y.o. male in no acute distress and alert and oriented to person, place and time. Physical Exam  Constitutional: Patient in no acute distress. Neuro: Alert and oriented to person, place and time. Psych: Mood normal, affect normal  Skin: No rash noted  HEENT: Head: Normocephalic andatraumatic  Conjunctivae and EOM are normal. Pupils are equal, round  Nose:Normal  Right External Ear: Normal; Left External Ear: Normal  Mouth: Mucosa Moist  Neck: Supple  Lungs: Respiratory effort is normal  Cardiovascular: Warm & Pink  Abdomen: Soft, non-tender, non-distended with no CVA,  No flank tenderness,  Or hepatosplenomegaly       Assessment and Plan      1. Prostate cancer ([address]    2. Stress incontinence of urine    3. Erectile dysfunction due to arterial insufficiency           Plan:     cont kegels  Cont cialis  Return in about 3 months (around 10/9/2020). Prescriptions Ordered:  Orders Placed This Encounter   Medications    tadalafil (CIALIS) 5 MG tablet     Si daily,  45 min Prior to activity take 20 mg for that day. Dispense:  45 tablet     Refill:  11     Orders Placed:  Orders Placed This Encounter   Procedures    PSA, Diagnostic     Standing Status:   Future     Standing Expiration Date:   2021           Codi Goyal MD    Agree with the ROS entered by the MA.

## 2020-07-15 ENCOUNTER — PROCEDURE VISIT (OUTPATIENT)
Dept: UROLOGY | Age: 60
End: 2020-07-15
Payer: COMMERCIAL

## 2020-07-15 VITALS — TEMPERATURE: 98.5 F

## 2020-07-15 PROCEDURE — 97750 PHYSICAL PERFORMANCE TEST: CPT | Performed by: NURSE PRACTITIONER

## 2020-07-15 PROCEDURE — 97032 APPL MODALITY 1+ESTIM EA 15: CPT | Performed by: NURSE PRACTITIONER

## 2020-07-15 PROCEDURE — 51784 ANAL/URINARY MUSCLE STUDY: CPT | Performed by: NURSE PRACTITIONER

## 2020-07-23 ENCOUNTER — PROCEDURE VISIT (OUTPATIENT)
Dept: UROLOGY | Age: 60
End: 2020-07-23
Payer: COMMERCIAL

## 2020-07-23 VITALS — TEMPERATURE: 98.3 F

## 2020-07-23 PROCEDURE — 97032 APPL MODALITY 1+ESTIM EA 15: CPT | Performed by: NURSE PRACTITIONER

## 2020-07-23 PROCEDURE — 51784 ANAL/URINARY MUSCLE STUDY: CPT | Performed by: NURSE PRACTITIONER

## 2020-07-23 PROCEDURE — 97750 PHYSICAL PERFORMANCE TEST: CPT | Performed by: NURSE PRACTITIONER

## 2020-07-30 ENCOUNTER — PROCEDURE VISIT (OUTPATIENT)
Dept: UROLOGY | Age: 60
End: 2020-07-30
Payer: COMMERCIAL

## 2020-07-30 ENCOUNTER — TELEPHONE (OUTPATIENT)
Dept: UROLOGY | Age: 60
End: 2020-07-30

## 2020-07-30 VITALS — TEMPERATURE: 98.3 F

## 2020-07-30 PROCEDURE — 51784 ANAL/URINARY MUSCLE STUDY: CPT | Performed by: NURSE PRACTITIONER

## 2020-07-30 PROCEDURE — 97032 APPL MODALITY 1+ESTIM EA 15: CPT | Performed by: NURSE PRACTITIONER

## 2020-07-30 PROCEDURE — 97750 PHYSICAL PERFORMANCE TEST: CPT | Performed by: NURSE PRACTITIONER

## 2020-07-30 NOTE — PROGRESS NOTES
PFT 7- 35 % improvement, 4 pads. Leaks only with standing, minimal leak at night or with sitting. Doing exercises.

## 2020-08-05 ENCOUNTER — PROCEDURE VISIT (OUTPATIENT)
Dept: UROLOGY | Age: 60
End: 2020-08-05
Payer: COMMERCIAL

## 2020-08-05 VITALS — TEMPERATURE: 98.3 F

## 2020-08-05 PROCEDURE — 97750 PHYSICAL PERFORMANCE TEST: CPT | Performed by: NURSE PRACTITIONER

## 2020-08-05 PROCEDURE — 97032 APPL MODALITY 1+ESTIM EA 15: CPT | Performed by: NURSE PRACTITIONER

## 2020-08-05 PROCEDURE — 51784 ANAL/URINARY MUSCLE STUDY: CPT | Performed by: NURSE PRACTITIONER

## 2020-08-11 ENCOUNTER — PROCEDURE VISIT (OUTPATIENT)
Dept: UROLOGY | Age: 60
End: 2020-08-11
Payer: COMMERCIAL

## 2020-08-11 VITALS — TEMPERATURE: 98 F

## 2020-08-11 PROCEDURE — 51784 ANAL/URINARY MUSCLE STUDY: CPT | Performed by: NURSE PRACTITIONER

## 2020-08-11 PROCEDURE — 97750 PHYSICAL PERFORMANCE TEST: CPT | Performed by: NURSE PRACTITIONER

## 2020-08-11 PROCEDURE — 97032 APPL MODALITY 1+ESTIM EA 15: CPT | Performed by: NURSE PRACTITIONER

## 2020-08-11 NOTE — PROGRESS NOTES
Here for PFT 9. He continues 30% improvement. Myrbetriq was started last week. He does fine with sitting, it is with standing he cannot feel an urge until he fels wet. He is faithfully doing PFt but is getting frustrated. His work is very stressful at times, there are many changes and he is a salaried employee. With relaxation techniques noted lower waveform post stim. Discussed mindfulness, checking in with his body for muscle tension. He knows when he is stressed his holds a lot of pressure in his abdomen. Continue Myrbetriq    See homework about relaxation techniques during kegels and throughout the day. Verbalized understanding.

## 2020-08-17 ENCOUNTER — TELEPHONE (OUTPATIENT)
Dept: UROLOGY | Age: 60
End: 2020-08-17

## 2020-08-17 NOTE — TELEPHONE ENCOUNTER
I spoke to the pt, I advised that he probably just over did it with the mowing and furniture moving . To increase his fluids and take it easy . To discuss the medication with Trinity Valentine at his appointment on Thursday. Pt verbalized understanding.

## 2020-08-17 NOTE — TELEPHONE ENCOUNTER
Patient called in office stating that he has been passing blood over the weekend. Patient states that he was helping move furniture on Saturday when all started. Patient also states that he cut the grass on Sunday and noticed more blood.  Adv will speak with provider and contact back

## 2020-08-20 ENCOUNTER — PROCEDURE VISIT (OUTPATIENT)
Dept: UROLOGY | Age: 60
End: 2020-08-20
Payer: COMMERCIAL

## 2020-08-20 VITALS — TEMPERATURE: 98.1 F

## 2020-08-20 PROCEDURE — 97750 PHYSICAL PERFORMANCE TEST: CPT | Performed by: NURSE PRACTITIONER

## 2020-08-20 PROCEDURE — 51784 ANAL/URINARY MUSCLE STUDY: CPT | Performed by: NURSE PRACTITIONER

## 2020-08-20 PROCEDURE — 97032 APPL MODALITY 1+ESTIM EA 15: CPT | Performed by: NURSE PRACTITIONER

## 2020-08-25 NOTE — PROGRESS NOTES
Here for PFT 10: started Myrberiq, 4 PPD, he can store more urine. His job entails walking and that is when he leaks the most. He is very anxious about having an accident at work, voids every hour at least. He love [email or web address] job but it is very stressful. In talking to him about it muscle tension increasing. Progressive relaxation techniques discussed and felt a noteable difference with this. Continue PFT next week and may go to every other week if continues to improve.

## 2020-08-26 ENCOUNTER — PROCEDURE VISIT (OUTPATIENT)
Dept: UROLOGY | Age: 60
End: 2020-08-26
Payer: COMMERCIAL

## 2020-08-26 VITALS — TEMPERATURE: 98.6 F

## 2020-08-26 PROCEDURE — 51784 ANAL/URINARY MUSCLE STUDY: CPT | Performed by: NURSE PRACTITIONER

## 2020-08-26 PROCEDURE — 97032 APPL MODALITY 1+ESTIM EA 15: CPT | Performed by: NURSE PRACTITIONER

## 2020-08-26 PROCEDURE — 97750 PHYSICAL PERFORMANCE TEST: CPT | Performed by: NURSE PRACTITIONER

## 2020-09-09 ENCOUNTER — PROCEDURE VISIT (OUTPATIENT)
Dept: UROLOGY | Age: 60
End: 2020-09-09
Payer: COMMERCIAL

## 2020-09-09 VITALS — TEMPERATURE: 97.8 F

## 2020-09-09 PROCEDURE — 97750 PHYSICAL PERFORMANCE TEST: CPT | Performed by: NURSE PRACTITIONER

## 2020-09-09 PROCEDURE — 97032 APPL MODALITY 1+ESTIM EA 15: CPT | Performed by: NURSE PRACTITIONER

## 2020-09-09 PROCEDURE — 51784 ANAL/URINARY MUSCLE STUDY: CPT | Performed by: NURSE PRACTITIONER

## 2020-09-09 PROCEDURE — 99213 OFFICE O/P EST LOW 20 MIN: CPT | Performed by: NURSE PRACTITIONER

## 2020-09-09 NOTE — PROGRESS NOTES
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 Continues Myrbetriq.      Discussed above with Dr Hilaria Darden, we will refer to Physical Therapy- [facility]

## 2020-09-17 ENCOUNTER — HOSPITAL ENCOUNTER (OUTPATIENT)
Dept: PHYSICAL THERAPY | Facility: CLINIC | Age: 60
Setting detail: THERAPIES SERIES
Discharge: HOME | End: 2020-09-17
Payer: COMMERCIAL

## 2020-09-17 PROCEDURE — 97161 PT EVAL LOW COMPLEX 20 MIN: CPT

## 2020-09-17 PROCEDURE — 97110 THERAPEUTIC EXERCISES: CPT

## 2020-09-18 ENCOUNTER — PATIENT MESSAGE (OUTPATIENT)
Dept: UROLOGY | Age: 60
End: 2020-09-18

## 2020-09-23 ENCOUNTER — HOSPITAL ENCOUNTER (OUTPATIENT)
Dept: PHYSICAL THERAPY | Facility: CLINIC | Age: 60
Setting detail: THERAPIES SERIES
Discharge: HOME | End: 2020-09-23
Payer: COMMERCIAL

## 2020-09-23 PROCEDURE — G0283 ELEC STIM OTHER THAN WOUND: HCPCS

## 2020-09-23 PROCEDURE — 97110 THERAPEUTIC EXERCISES: CPT

## 2020-09-23 PROCEDURE — 90912 BFB TRAINING 1ST 15 MIN: CPT

## 2020-10-01 LAB — PSA, ULTRASENSITIVE: <0.01 NG/ML (ref 0–4)

## 2020-10-07 ENCOUNTER — TELEPHONE (OUTPATIENT)
Dept: UROLOGY | Age: 60
End: 2020-10-07

## 2020-10-07 ENCOUNTER — HOSPITAL ENCOUNTER (OUTPATIENT)
Dept: PHYSICAL THERAPY | Facility: CLINIC | Age: 60
Setting detail: THERAPIES SERIES
Discharge: HOME | End: 2020-10-07
Payer: COMMERCIAL

## 2020-10-07 PROCEDURE — 90912 BFB TRAINING 1ST 15 MIN: CPT

## 2020-10-07 PROCEDURE — G0283 ELEC STIM OTHER THAN WOUND: HCPCS

## 2020-10-08 ENCOUNTER — OFFICE VISIT (OUTPATIENT)
Dept: UROLOGY | Age: 60
End: 2020-10-08
Payer: COMMERCIAL

## 2020-10-08 VITALS — SYSTOLIC BLOOD PRESSURE: 120 MMHG | HEART RATE: 84 BPM | DIASTOLIC BLOOD PRESSURE: 77 MMHG | TEMPERATURE: 97.3 F

## 2020-10-08 PROCEDURE — 99213 OFFICE O/P EST LOW 20 MIN: CPT | Performed by: NURSE PRACTITIONER

## 2020-10-08 RX ORDER — OXYBUTYNIN CHLORIDE 5 MG/1
5 TABLET ORAL 3 TIMES DAILY
Qty: 90 TABLET | Refills: 11 | Status: SHIPPED | OUTPATIENT
Start: 2020-10-08 | End: 2020-12-22 | Stop reason: ALTCHOICE

## 2020-10-08 ASSESSMENT — ENCOUNTER SYMPTOMS
WHEEZING: 0
VOMITING: 0
EYE PAIN: 0
NAUSEA: 0
SHORTNESS OF BREATH: 0
COUGH: 0
DIARRHEA: 0
ABDOMINAL PAIN: 0
CONSTIPATION: 0
EYE REDNESS: 0
BACK PAIN: 0

## 2020-10-08 NOTE — LETTER
[address] [address]  Dept: [phone]  Dept Fax: [phone]        10/8/20    Patient: Jamar Hayes  YOB: 1960    Dear Zeenat Cole,    I had the pleasure of seeing one of your patients, Jess Mosley today in the office today. Below are the relevant portions of my assessment and plan of care. IMPRESSION:  1. Mixed incontinence urge and stress    2. S/P prostatectomy    3. Prostate cancer ([facility] [address]        PLAN:  Oxybutynin start 2x per day - one in the morning and one at lunch     Follow up in 3 months with a PSA. No follow-ups on file. Prescriptions Ordered:  Orders Placed This Encounter   Medications    oxybutynin (DITROPAN) 5 MG tablet     Sig: Take 1 tablet by mouth 3 times daily     Dispense:  90 tablet     Refill:  11     Orders Placed:  No orders of the defined types were placed in this encounter. Thank you for allowing me to participate in the care of this patient. I will keep you updated on this patient's follow up and I look forward to serving you and your patients again in the future.     MARTINEZ Cutler - CNP

## 2020-10-08 NOTE — PATIENT INSTRUCTIONS
Oxybutynin start 2x per day - one in the morning and one at lunch     Follow up in 3 months with a PSA.

## 2020-10-08 NOTE — PROGRESS NOTES
[address] [address]  Dept: [address] [facility] Urology Office Note - Established    Patient:  Radha Horton  YOB: 1960  Date: 10/8/2020    The patient is a 56 y.o. male who presents todayfor evaluation of the following problems:   Chief Complaint   Patient presents with    6 Month Follow-Up     with PSA    Incontinence       HPI  Here for RRP- 3/4/2020-  here for PSA. He continues PFT through PT. His leaking has gotten worse since he started PT. He is a little overwhelmed with the leaking. Summary of old records: N/A    Additional History: N/A    Procedures Today: N/A    Urinalysis today:  No results found for this visit on 10/08/20.   Last several PSA's:  No results found for: PSA  Last total testosterone:  No results found for: TESTOSTERONE    AUA Symptom Score (10/8/2020):  INCOMPLETE EMPTYING: How often have you had the sensation of not emptying your bladder?: Not at all  FREQUENCY: How often do you have to urinate less than every two hours?: Not at all  INTERMITTENCY: How often have you found you stopped and started again several times when you urinated?: Not at all  URGENCY: How often have you found it difficult to postpone urination?: Almost always  WEAK STREAM: How often have you had a weak urinary stream?: Not at all  STRAINING: How often have you had to strain to start  urination?: Not at all  NOCTURIA: How many times did you typically get up at night to uriniate?: 1 Time  TOTAL I-PSS SCORE[de-identified] 6  How would you feel if you were to spend the rest of your life with your urinary condition?: Mostly Dissatisfied    Last BUN and creatinine:  Lab Results   Component Value Date    BUN 11 03/05/2020     Lab Results   Component Value Date    CREATININE 0.85 03/05/2020       Additional Lab/Culture results:     Imaging Reviewed during this Office Visit:   (results were independently reviewed by physician and radiology report verified)    PAST MEDICAL, FAMILY AND SOCIAL HISTORY UPDATE:  Past Medical History:   Diagnosis Date    Colon polyps     Diverticulitis     Hyperlipidemia     Dr. Yamilet Mae [facility])    Rajan Logan Wears prescription eyeglasses     Wellness examination     Dr. Brea Jones last seen 12/31/2019     Past Surgical History:   Procedure Laterality Date    COLONOSCOPY  2012, 2017    PROSTATE BIOPSY N/A     2/7/2020    PROSTATECTOMY  03/04/2020    XI ROBOTIC LAPAROSCOPIC, WITH LYMPH NODE DISSECTION     PROSTATECTOMY N/A 3/4/2020    XI ROBOTIC LAPAROSCOPIC PROSTATECTOMY WITH LYMPH NODE DISSECTION performed by Kaykay Covarrubias MD at [place] [id number]       Family History   Problem Relation Age of Onset    Prostate Cancer Brother         bladder and prostate    Colon Cancer Mother         lung    Heart Disease Father     Heart Attack Father     COPD Father         COPD     Outpatient Medications Marked as Taking for the 10/8/20 encounter (Office Visit) with MARTINEZ Vo CNP   Medication Sig Dispense Refill    oxybutynin (DITROPAN) 5 MG tablet Take 1 tablet by mouth 3 times daily 90 tablet 11    mirabegron (MYRBETRIQ) 50 MG TB24 Take 50 mg by mouth daily 28 tablet 0    tadalafil (CIALIS) 5 MG tablet 1 daily,  45 min Prior to activity take 20 mg for that day.  45 tablet 11    loratadine (CLARITIN) 10 MG tablet       zolpidem (AMBIEN) 10 MG tablet       omeprazole (PRILOSEC) 20 MG delayed release capsule Take 20 mg by mouth Daily      sertraline (ZOLOFT) 50 MG tablet Take 50 mg by mouth daily       traMADol (ULTRAM) 50 MG tablet       vitamin D 25 MCG (1000 UT) CAPS Take by mouth      fenofibrate 160 MG tablet Take 160 mg by mouth daily      Ascorbic Acid (VITAMIN C) 500 MG tablet Take 500 mg by mouth daily      psyllium (METAMUCIL) 0.52 G capsule Take 0.52 g by mouth daily      aspirin 81 MG tablet Take 81 mg by mouth daily         Dye [iodides]; Sulfamethoxazole; and Ciprofloxacin  Social History     Tobacco Use   Smoking Status Never Smoker   Smokeless Tobacco Never Used     (Ifpatient a smoker, smoking cessation counseling offered)    Social History     Substance and Sexual Activity   Alcohol Use No       REVIEW OF SYSTEMS:  Review of Systems    Physical Exam:      Vitals:    10/08/20 1518   BP: 120/77   Pulse: 84   Temp: 97.3 °F (36.3 °C)     There is no height or weight on file to calculate BMI. Patient is a 56 y.o. male in no acute distress and alert and oriented to person, place and time. Physical Exam  Constitutional: Patient in no acute distress. Neuro: Alert and oriented to person, place and time. Psych: Mood normal, affect normal  Skin: No rash noted  HEENT: Head: Normocephalic andatraumatic  Conjunctivae and EOM are normal. Pupils are equal, round  Nose:Normal  Right External Ear: Normal; Left External Ear: Normal  Mouth: Mucosa Moist  Neck: Supple  Lungs: Respiratory effort is normal  Cardiovascular: Warm & Pink  Abdomen: Soft, non-tender, non-distended   Bladder non-tender and not distended. Musculoskeletal: Normal gait and station      Assessment and Plan      1. Mixed incontinence urge and stress    2. S/P prostatectomy    3. Prostate cancer ([facility] [address]           Plan:     Oxybutynin start 2x per day - one in the morning and one at lunch     Follow up in 3 months with a PSA. No follow-ups on file. Prescriptions Ordered:  Orders Placed This Encounter   Medications    oxybutynin (DITROPAN) 5 MG tablet     Sig: Take 1 tablet by mouth 3 times daily     Dispense:  90 tablet     Refill:  11     Orders Placed:  No orders of the defined types were placed in this encounter. MARTINEZ Alanis CNP    reviewed and Agree with the ROS entered by the MA. General

## 2020-10-21 ENCOUNTER — APPOINTMENT (OUTPATIENT)
Dept: PHYSICAL THERAPY | Facility: CLINIC | Age: 60
End: 2020-10-21
Payer: COMMERCIAL

## 2020-10-22 ENCOUNTER — OFFICE VISIT (OUTPATIENT)
Dept: UROLOGY | Age: 60
End: 2020-10-22
Payer: COMMERCIAL

## 2020-10-22 VITALS
BODY MASS INDEX: 25.01 KG/M2 | HEIGHT: 68 IN | WEIGHT: 165 LBS | TEMPERATURE: 97.3 F | SYSTOLIC BLOOD PRESSURE: 133 MMHG | HEART RATE: 66 BPM | DIASTOLIC BLOOD PRESSURE: 84 MMHG

## 2020-10-22 PROCEDURE — 99214 OFFICE O/P EST MOD 30 MIN: CPT | Performed by: UROLOGY

## 2020-10-22 RX ORDER — SOLIFENACIN SUCCINATE 10 MG/1
10 TABLET, FILM COATED ORAL DAILY
Qty: 90 TABLET | Refills: 3 | Status: SHIPPED | OUTPATIENT
Start: 2020-10-22 | End: 2020-12-22 | Stop reason: ALTCHOICE

## 2020-10-22 RX ORDER — DARIFENACIN HYDROBROMIDE 15 MG/1
15 TABLET, EXTENDED RELEASE ORAL DAILY
Qty: 30 TABLET | Refills: 3 | Status: SHIPPED | OUTPATIENT
Start: 2020-10-22 | End: 2020-12-22 | Stop reason: ALTCHOICE

## 2020-10-22 ASSESSMENT — ENCOUNTER SYMPTOMS
SHORTNESS OF BREATH: 0
ABDOMINAL PAIN: 0
VOMITING: 0
BACK PAIN: 0
EYE PAIN: 0
CONSTIPATION: 0
NAUSEA: 0
EYE REDNESS: 0
DIARRHEA: 0
WHEEZING: 0
COUGH: 0

## 2020-10-22 NOTE — PROGRESS NOTES
[address] [address]  Dept: 92 [facility] Urology Office Note - Established    Patient:  Louie Emery  YOB: 1960  Date: 10/22/2020    The patient is a 56 y.o. male who presents todayfor evaluation of the following problems:   Chief Complaint   Patient presents with    Prostate Cancer     failed PFT     Incontinence       HPI  Had rrp in past. psa is 0.01. has mod incontinence. No dysuria, no hematuria. On 5 ppd. Strong stream    Summary of old records: N/A    Additional History: N/A    Procedures Today: N/A    Urinalysis today:  No results found for this visit on 10/22/20.   Last several PSA's:  No results found for: PSA  Last total testosterone:  No results found for: TESTOSTERONE    AUA Symptom Score (10/22/2020):  INCOMPLETE EMPTYING: How often have you had the sensation of not emptying your bladder?: Not at all  FREQUENCY: How often do you have to urinate less than every two hours?: Almost always  INTERMITTENCY: How often have you found you stopped and started again several times when you urinated?: More than Half the time  URGENCY: How often have you found it difficult to postpone urination?: Almost always  WEAK STREAM: How often have you had a weak urinary stream?: Almost always  STRAINING: How often have you had to strain to start  urination?: Less than 1 to 5 times  NOCTURIA: How many times did you typically get up at night to uriniate?: 2 Times  TOTAL I-PSS SCORE[de-identified] 22  How would you feel if you were to spend the rest of your life with your urinary condition?: Terrible    Last BUN and creatinine:  Lab Results   Component Value Date    BUN 11 03/05/2020     Lab Results   Component Value Date    CREATININE 0.85 03/05/2020       Additional Lab/Culture results: none    Imaging Reviewed during this Office Visit: none  (results were independently reviewed by physician and radiology report verified)    PAST MEDICAL, FAMILY AND SOCIAL HISTORY UPDATE:  Past Medical History:   Diagnosis Date    Colon polyps     Diverticulitis     Hyperlipidemia     Dr. Fatmata Iniguez [facility])    Chelsy Pastrana Wears prescription eyeglasses     Wellness examination     Dr. Savita Gibson last seen 12/31/2019     Past Surgical History:   Procedure Laterality Date    COLONOSCOPY  2012, 2017    PROSTATE BIOPSY N/A     2/7/2020    PROSTATECTOMY  03/04/2020    XI ROBOTIC LAPAROSCOPIC, WITH LYMPH NODE DISSECTION     PROSTATECTOMY N/A 3/4/2020    XI ROBOTIC LAPAROSCOPIC PROSTATECTOMY WITH LYMPH NODE DISSECTION performed by Tatum Oh MD at [address] [place] [address]       Family History   Problem Relation Age of Onset    Prostate Cancer Brother         bladder and prostate    Colon Cancer Mother         lung    Heart Disease Father     Heart Attack Father     COPD Father         COPD     Outpatient Medications Marked as Taking for the 10/22/20 encounter (Office Visit) with Tatum Oh MD   Medication Sig Dispense Refill    VESICARE 10 MG tablet Take 1 tablet by mouth daily 90 tablet 3    darifenacin (ENABLEX) 15 MG extended release tablet Take 1 tablet by mouth daily 30 tablet 3    oxybutynin (DITROPAN) 5 MG tablet Take 1 tablet by mouth 3 times daily 90 tablet 11    mirabegron (MYRBETRIQ) 50 MG TB24 Take 50 mg by mouth daily 28 tablet 0    tadalafil (CIALIS) 5 MG tablet 1 daily,  45 min Prior to activity take 20 mg for that day.  45 tablet 11    loratadine (CLARITIN) 10 MG tablet       zolpidem (AMBIEN) 10 MG tablet       omeprazole (PRILOSEC) 20 MG delayed release capsule Take 20 mg by mouth Daily      sertraline (ZOLOFT) 50 MG tablet Take 50 mg by mouth daily       traMADol (ULTRAM) 50 MG tablet       vitamin D 25 MCG (1000 UT) CAPS Take by mouth      fenofibrate 160 MG tablet Take 160 mg by mouth daily      Ascorbic Acid (VITAMIN C) 500 MG tablet Take 500 mg by mouth daily      psyllium (METAMUCIL) 0.52 G capsule Take 0.52 g by mouth daily      aspirin 81 MG tablet Take 81 mg by mouth daily         Dye [iodides]; Sulfamethoxazole; and Ciprofloxacin  Social History     Tobacco Use   Smoking Status Never Smoker   Smokeless Tobacco Never Used     (Ifpatient a smoker, smoking cessation counseling offered)    Social History     Substance and Sexual Activity   Alcohol Use No       REVIEW OF SYSTEMS:  Review of Systems    Physical Exam:      Vitals:    10/22/20 0819   BP: 133/84   Pulse: 66   Temp: 97.3 °F (36.3 °C)     Body mass index is 25.09 kg/m². Patient is a 56 y.o. male in no acute distress and alert and oriented to person, place and time. Physical Exam  Constitutional: Patient in no acute distress. Neuro: Alert and oriented to person, place and time. Psych: Mood normal, affect normal  Skin: No rash noted  HEENT: Head: Normocephalic andatraumatic  Conjunctivae and EOM are normal. Pupils are equal, round  Nose:Normal  Right External Ear: Normal; Left External Ear: Normal  Mouth: Mucosa Moist  Neck: Supple  Lungs: Respiratory effort is normal  Cardiovascular: Warm & Pink  Abdomen: Soft, non-tender, non-distended with no CVA,  No flank tenderness,  Or hepatosplenomegaly       Assessment and Plan      1. Prostate cancer ([address]    2. Stress incontinence of urine    3. Erectile dysfunction due to arterial insufficiency           Plan:     cysto urodynamics  LIKELY NEEDS AUS  Return for cysto and urodynamics. Prescriptions Ordered:  Orders Placed This Encounter   Medications    VESICARE 10 MG tablet     Sig: Take 1 tablet by mouth daily     Dispense:  90 tablet     Refill:  3    darifenacin (ENABLEX) 15 MG extended release tablet     Sig: Take 1 tablet by mouth daily     Dispense:  30 tablet     Refill:  3     Orders Placed:  No orders of the defined types were placed in this encounter. Charlette Aviles MD    Agree with the ROS entered by the MA.

## 2020-11-09 ENCOUNTER — TELEPHONE (OUTPATIENT)
Dept: UROLOGY | Age: 60
End: 2020-11-09

## 2020-11-09 NOTE — TELEPHONE ENCOUNTER
cancellation OV. Provider cancelled because of family emergency. Will call patient to call back to schedule.

## 2020-12-16 ENCOUNTER — TELEPHONE (OUTPATIENT)
Dept: UROLOGY | Age: 60
End: 2020-12-16

## 2020-12-22 ENCOUNTER — PROCEDURE VISIT (OUTPATIENT)
Dept: UROLOGY | Age: 60
End: 2020-12-22
Payer: COMMERCIAL

## 2020-12-22 VITALS — TEMPERATURE: 98.1 F

## 2020-12-22 PROCEDURE — 51741 ELECTRO-UROFLOWMETRY FIRST: CPT | Performed by: UROLOGY

## 2020-12-22 PROCEDURE — 51797 INTRAABDOMINAL PRESSURE TEST: CPT | Performed by: UROLOGY

## 2020-12-22 PROCEDURE — 51784 ANAL/URINARY MUSCLE STUDY: CPT | Performed by: UROLOGY

## 2020-12-22 PROCEDURE — 51728 CYSTOMETROGRAM W/VP: CPT | Performed by: UROLOGY

## 2020-12-27 NOTE — CONSULTS
[] [facility]) [facility] &  Therapy  [address]  [phone]  F: [phone] [] [address]  [address]   [address]  P: [phone]  F: [phone] [x] [address] &  Therapy  [address]  P: [phone]  F: [phone] [] [address]  P: [phone]  F: [phone] [] [address]  [facility]   [address]   [place]: [phone]  F: [phone]      Physical Therapy General Evaluation    Date:  2020  Patient: Tamela Molina  : 1960  MRN: [id number]  Physician: Debora Soares     Insurance: [employer](30 vs)  Medical Diagnosis: s/p prostatectomy, Mixed Incontinence    Rehab Codes: N39.3, M62.50  Onset Date: 3/7/2020                                  Next 's appt: PRN    Subjective:   CC:Pt is a 55 yo male who presents with complaints of urinary incontinence. State he had prostate removal on 3/7/2020. Since that time, he has had continued problems with leakage of urine. He states he does not have any control and is saturating between about 5-6 pads per day and 2-3 at night. Had some treatment at urology office but has been unsuccessful thus far. HPI: (onset date:3/7/2020)     PMHx: []? Unremarkable        []? Diabetes     []? HTN                        []? Pacemaker              []? MI/Heart Problems []? Cancer       []? Arthritis       []? Other:              [x]? Refer to full medical chart  In EPIC      Comorbidities:   []? Obesity []? Dialysis  []? Other:   []? Asthma/COPD []? Dementia []? Other:   []? Stroke []? Sleep apnea []? Other:   []? Vascular disease []? Rheumatic disease []? Other:      Tests:  []? X-Ray:        []? MRI:                       []? Other:     Medications: [x]?  Refer to full medical record []? None          []? Other:  Allergies:      [x]? Refer to full medical record []? None          []? Other:     Function:  Hand Dominance  []? Right  []? Left  Employer [employer]   Job Status [x]? Normal duty   []? Light duty   []? Off due to condition    []? Retired   []? Not employed   []? Disability  []? Other:  []? Return to work: Work activities/duties  Environmental Health and          ADL/IADL Previous level of function Current level of function Who currently assists the patient with task   Bathing  [x]? Independent  []? Assist [x]? Independent  []? Assist     Dress/grooming [x]? Independent  []? Assist [x]? Independent  []? Assist     Transfer/mobility [x]? Independent  []? Assist [x]? Independent  []? Assist     Feeding [x]? Independent  []? Assist [x]? Independent  []? Assist     Toileting [x]? Independent  []? Assist [x]? Independent  []? Assist     Driving [x]? Independent  []? Assist [x]? Independent  []? Assist     Housekeeping [x]? Independent  []? Assist [x]? Independent  []? Assist     Grocery shop/meal prep [x]? Independent  []? Assist [x]? Independent  []? Assist        Gait Prior level of function Current level of function     [x]? Independent  []? Assist [x]? Independent  []? Assist   Device: []? Independent []? Independent     []? Straight Cane []? Quad cane []? Straight Cane []? Quad cane     []? Standard walker []? Rolling walker   []? 4 wheeled walker []? Standard walker []? Rolling walker   []? 4 wheeled walker     []? Wheelchair []? Wheelchair      Pain:  []? Yes  [x]? No  Location: NA   Pain Rating: (0-10 scale) 0/10  Pain altered Tx:  []? Yes  [x]? No  Action:     Symptoms:  []? Improving     []? Worsening  [x]? Same  Better:  []? AM    []? PM    []? Sit    []? Rise/Sit    []? Stand    []? Walk    []? Lying    []? Other:  Worse: []? AM    []? PM    []? Sit    []? Rise/Sit    []? Stand    []? Walk    []? Lying    []?  [facility] []? [place]    []? Other:  Sleep: []? OK    [x]? Disturbed     Objective:             OBSERVATION  No Deficit  Deficit  Not Tested  Comments    External  X          Posture  X          Pelvic alignment  X          Muscle Spasm  X          Scarring  X          Diastasis  X          EAS  X         Perineal Descent   X         Skin Condition  X          Excursion    X   Min excursion    External Clock  X          Internal             Internal clock  X          Muscle bulk    X   decreased    Muscle Power    X   1-2/5    Muscle Endurance    X   2 sec with muscle flickering   Quality of Contraction    X   slow rise, decreased hold    Specificity    X   Mod overflow of glutes, adductors    Coordination    X       Sensation  X          FUNCTION  Normal  Difficult  Unable        Cough/Sneeze    X         Supine-Sit    X         Squatting    X         Bending/ stooping    X         Stairs    X         Hopping    X         Jumping    X         Running    X         Lifting/carrying    X              Comments:     Assessment:   Patient noted with weak PF musculature as well as inability to use correct musculature to regain control. Pt  would benefit from skilled physical therapy services in order to improve function and PF strength via biofeedback, ES, exercise and education as appropriate. May need home e-stim unit.      Problems:    []? ? Pain:  []? ? ROM:  [x]? ? Strength:  [x]? ? Function:  []? Other:       STG: (to be met in 4 treatments)  1. PF contraction long enough to inhibit bladder contraction  2. Able to isolate PF musculature  3. ? Strength: PF 3/5  4. Able to maintain voiding schedule of 3-4 hours  5. Patient to be independent with home exercise program as demonstrated by performance with correct form without cues. LTG: (to be met in 8 treatments)  1. Cough, sneeze, laugh without incontinence  2. Perform basic ADL's without leaking     Patient goals:Bladder Control     Rehab Potential:  [x]? Good  []? Fair  []? Poor              Suggested Professional Referral:  [x]? No  []? Yes:  Barriers to Goal Achievement:  [x]? No  []? Yes:  Domestic Concerns:  [x]? No  []? Yes:     Pt. Education:  [x]? Plans/Goals, Risks/Benefits discussed  [x]? Home exercise program  Method of Education: [x]? Verbal  [x]? Demo  [x]? Written(Male PF picture explanation, urine stop test, PF ex short and long holds, The knack ex)  Comprehension of Education:  [x]? Verbalizes understanding. [x]? Demonstrates understanding. []? Needs Review. []? Demonstrates/verbalizes understanding of HEP/Ed previously given.     Treatment Plan:  [x]? Therapeutic Exercise   90206             []? Iontophoresis: 4 mg/mL Dexamethasone Sodium Phosphate  mAmin  72434   []? Therapeutic Activity  57644 []? Vasopneumatic cold with compression  [id number]               []? Gait Training    34938 []? Ultrasound        [id number]   [x]? Neuromuscular Re-education  [id number] [x]? Electrical Stimulation Unattended  [id number]   []? Manual Therapy  10957 []? Electrical Stimulation Attended  [id number]   [x]? Instruction in HEP       []? Lumbar/Cervical Traction  [id number]   []? Aquatic Therapy           [id number] []? Cold/hotpack     []? Massage   76796      []? Dry Needling, 1 or 2 muscles  99167   [x]? Biofeedback, first 15 minutes   53985  []? Biofeedback, additional 15 minutes   58801 []? Dry Needling, 3 or more muscles  12553      Frequency:  1 x/week for 8 visits     Todays Treatment:  Modalities:   Exercises:  Exercise Reps/ Time Weight/ Level Comments   Urine stop test         PF ex: short holds         PF ex: long holds         The knack ex                   Other:     Specific Instructions for next treatment:Biofeedback, ES as needed     Evaluation Complexity:  History (Personal factors, comorbidities) []? 0 [x]? 1-2 []? 3+   Exam (limitations, restrictions) [x]? 1-2 []? 3 []? 4+   Clinical presentation (progression) [x]? Stable []? Evolving  []? Unstable   Decision Making [x]? Low []?  Moderate []? High     [x]? Low Complexity []? Moderate Complexity []? High Complexity         Treatment Charges: Mins Units   [x]? Evaluation       [x]? Low       []? Moderate       []? High 20 1   []? Modalities       [x]? Ther Exercise 15 1   []? Manual Therapy       []? Ther Activities       []? Aquatics       []? Vasocompression       []?   Other          TOTAL TREATMENT TIME: 35     Time in:1600     Time out:1640     Electronically signed by: Jacqui Amor PT     Physician Signature:________________________________Date:__________________  By signing above or cosigning this note, I have reviewed this plan of care and certify a need for medically necessary rehabilitation services.      *PLEASE SIGN ABOVE AND FAX BACK ALL PAGES*

## 2020-12-27 NOTE — FLOWSHEET NOTE
[] [address]  [address]  [phone]  F: [phone] [] [address]  [facility]   [address]  P: [phone]  F: [phone] [x] [address] &  Therapy  [address]  P: [phone]  F: [phone] [] [address]  P: [phone]  F: [phone] [] [address]  [facility]   [address]   [place]: [phone]  F: [phone]      Physical Therapy Daily Treatment Note    Date:  10/7/2020  Patient Name:  Summer Givens    :  1960  MRN: [id number]  Physician: Song Escobar                               Insurance: [employer](30 vs)  Medical Diagnosis: s/p prostatectomy, Mixed Incontinence                         Rehab Codes: N39.3, M62.50  Onset Date: 3/7/2020                                  Next 's appt: PRN    Visit# / total visits: 3/8     Cancels/No Shows: 0    Subjective:    Pain:  []? Yes  [x]? No   Location: N/A  Pain Rating: (0-10 scale) 0/10  Pain altered Tx:  [x]? No  []? Yes  Action:  Comments:Pt states he Feels like he has been getting worse with more leaking and less control. Very frustrated.     Objective:  Modalities:   Precautions:  Exercises:  Exercise Reps/ Time Weight/ Level Comments   Urine stop test         PF ex: short holds         PF ex: long holds         The knack ex                   Other:        Treatment Charges: Mins Units   [x]? Modalities: ES 15 1   [x]? Ther Exercise 5 0   []? Manual Therapy       []? Ther Activities       []? Aquatics       []? Vasocompression       [x]? Other: BF 15 1   Total Treatment time 40 3         Assessment:  [x]? Progressing toward goals. []? No change. [x]?  Other:Hooked pt up to biofeedback via internal sensor. Cont with elevated resting tone. Min overflow of abd/glutes. Corrected with cues. Hold time noted of about 2-3 seconds then muscular reengagement to attempt to maintain the contraction. Fair coordination with quick contractions. Worked with the viraj graphic for force closure, hold time and coordination. Improved understanding. Continued with ES treatment for PF with Internal sensor x 15 min at 5:10 cycle time. Pt contracted with machine for last 10 min of the session. Reviewed HEP. Will see in about 3-4 weeks for recheck and advancement. [x]? Patient would continue to benefit from skilled physical therapy services in order to: improve strength and improve function.     STG: (to be met in 4 treatments)  1. PF contraction long enough to inhibit bladder contraction  2. Able to isolate PF musculature  3. ? Strength: PF 3/5  4. Able to maintain voiding schedule of 3-4 hours  5. Patient to be independent with home exercise program as demonstrated by performance with correct form without cues. LTG: (to be met in 8 treatments)  1. Cough, sneeze, laugh without incontinence  2. Perform basic ADL's without leaking     Patient goals:Bladder Control     Pt. Education:  [x]? Yes  []? No  [x]? Reviewed Prior HEP/Ed  Method of Education: [x]? Verbal  [x]? Demo  []? Written  Comprehension of Education:  [x]? Verbalizes understanding. [x]? Demonstrates understanding. []? Needs review. []? Demonstrates/verbalizes HEP/Ed previously given.              Plan:    [x]? Continue current frequency toward long and short term goals. [x]? Specific Instructions for subsequent treatments: BF, ES, bridge and clam shell ex.                             Time In: 1600           Time Out: 1645     Electronically signed by:  Mitchel Morfin, PT

## 2020-12-27 NOTE — FLOWSHEET NOTE
[] [facility]) [facility] &  Therapy  [address]  [phone]  F: [phone] [] [address]   [address]  P: [phone]  F: [phone] [x] [address] &  Therapy  [address] [facility]  P: [phone]  F: [phone] [] [address]  P: [phone]  F: [phone] [] [address]  [facility]   [address]   [place]: [phone]  F: [phone]      Physical Therapy Daily Treatment Note    Date:  2020  Patient Name:  Lynette Macias    :  1960  MRN: [id number]  Physician: Luna Dub [address]: [employer](30 vs)  Medical Diagnosis: s/p prostatectomy, Mixed Incontinence                         Rehab Codes: N39.3, M62.50  Onset Date: 3/7/2020                                  Next 's appt: PRN    Visit# / total visits: 2/8     Cancels/No Shows: 0    Subjective:    Pain:  []? Yes  [x]? No   Location: N/A  Pain Rating: (0-10 scale) 0/10  Pain altered Tx:  [x]? No  []? Yes  Action:  Comments:Pt states he has not noted much improvement. Anxious to get treatment and cure for the incontinence.     Objective:  Modalities:   Precautions:  Exercises:  Exercise Reps/ Time Weight/ Level Comments   Urine stop test         PF ex: short holds         PF ex: long holds         The knack ex                   Other:        Treatment Charges: Mins Units   [x]? Modalities: ES 15 1   [x]? Ther Exercise 10 1   []? Manual Therapy       []? Ther Activities       []? Aquatics       []? Vasocompression       [x]? Other: BF 15 1   Total Treatment time 40 3         Assessment:  [x]? Progressing toward goals. []? No change. [x]?  Other:Hooked pt up to biofeedback via internal sensor. Noted elevated resting tone. Min overflow of abd/glutes. Hold time noted of about 2-3 seconds then muscular reengagement to attempt to maintain the contraction. Fair coordination with quick contractions. Worked with the viraj graphic for force closure, hold time and coordination. Improved understanding. Continued with ES treatment for PF with Internal sensor x 15 min at 5:10 cycle time. Pt contracted with machine for last 10 min of the session. Reviewed HEP. Will see in about 2 weeks for recheck and advancement. [x]? Patient would continue to benefit from skilled physical therapy services in order to: improve strength and improve function.     STG: (to be met in 4 treatments)  1. PF contraction long enough to inhibit bladder contraction  2. Able to isolate PF musculature  3. ? Strength: PF 3/5  4. Able to maintain voiding schedule of 3-4 hours  5. Patient to be independent with home exercise program as demonstrated by performance with correct form without cues. LTG: (to be met in 8 treatments)  1. Cough, sneeze, laugh without incontinence  2. Perform basic ADL's without leaking     Patient goals:Bladder Control     Pt. Education:  [x]? Yes  []? No  [x]? Reviewed Prior HEP/Ed  Method of Education: [x]? Verbal  [x]? Demo  []? Written  Comprehension of Education:  [x]? Verbalizes understanding. [x]? Demonstrates understanding. []? Needs review. []? Demonstrates/verbalizes HEP/Ed previously given.              Plan:    [x]? Continue current frequency toward long and short term goals. [x]? Specific Instructions for subsequent treatments: BF, ES, bridge and clam shell ex.                             Time In: 1600           Time Out: 1645     Electronically signed by:  Armand Ram, PT

## 2020-12-28 LAB — PSA, ULTRASENSITIVE: <0.01 NG/ML (ref 0–4)

## 2021-01-14 ENCOUNTER — PROCEDURE VISIT (OUTPATIENT)
Dept: UROLOGY | Age: 61
End: 2021-01-14
Payer: COMMERCIAL

## 2021-01-14 VITALS
BODY MASS INDEX: 25.01 KG/M2 | WEIGHT: 165 LBS | SYSTOLIC BLOOD PRESSURE: 132 MMHG | HEART RATE: 87 BPM | TEMPERATURE: 98.3 F | DIASTOLIC BLOOD PRESSURE: 83 MMHG | HEIGHT: 68 IN

## 2021-01-14 DIAGNOSIS — C61 PROSTATE CANCER (HCC): Primary | ICD-10-CM

## 2021-01-14 PROCEDURE — 52000 CYSTOURETHROSCOPY: CPT | Performed by: UROLOGY

## 2021-01-14 PROCEDURE — 99999 PR OFFICE/OUTPT VISIT,PROCEDURE ONLY: CPT | Performed by: UROLOGY

## 2021-01-14 RX ORDER — SERTRALINE HYDROCHLORIDE 100 MG/1
100 TABLET, FILM COATED ORAL DAILY
COMMUNITY
Start: 2021-01-11

## 2021-01-14 NOTE — PROGRESS NOTES
Cystoscopy Operative Note (1/14/21)  Surgeon: Mandy Quesada    Anesthesia: Urethral 2% Xylocaine   Indications: Prostate Cancer   Position: Dorsal Lithotomy    Findings:   Risks and Benefits discussed with patient prior to procedure. The patient was prepped and draped in the usual sterile fashion. The flexible cystoscope was advanced through the urethra and into the bladder. The bladder was thoroughly inspected and the following was noted:    Residual Urine: none  Urethra: normal appearing urethra with no masses, tenderness or lesions  Prostate: absent of prostate  Bladder: No tumors or CIS noted. No bladder diverticulum. There was none trabeculation noted. Ureters: Clear efflux from both ureters. Orifices with normal configuration and location. The cystoscope was removed. The patient tolerated the procedure well. Sling vs aus, pt will decide   ONLY AT [facility]    Agree with the ROS entered by the MA.

## 2021-02-17 ENCOUNTER — HOSPITAL ENCOUNTER (OUTPATIENT)
Dept: SLEEP CENTER | Age: 61
Discharge: HOME | End: 2021-02-19
Payer: COMMERCIAL

## 2021-02-17 PROCEDURE — G0399 HOME SLEEP TEST/TYPE 3 PORTA: HCPCS

## 2021-02-23 LAB — PSA, ULTRASENSITIVE: <0.01 NG/ML (ref 0–4)

## 2021-02-25 ENCOUNTER — TELEPHONE (OUTPATIENT)
Dept: UROLOGY | Age: 61
End: 2021-02-25

## 2021-03-11 ENCOUNTER — OFFICE VISIT (OUTPATIENT)
Dept: UROLOGY | Age: 61
End: 2021-03-11
Payer: COMMERCIAL

## 2021-03-11 VITALS
HEART RATE: 93 BPM | HEIGHT: 68 IN | DIASTOLIC BLOOD PRESSURE: 75 MMHG | BODY MASS INDEX: 25.01 KG/M2 | WEIGHT: 165 LBS | TEMPERATURE: 98.1 F | SYSTOLIC BLOOD PRESSURE: 107 MMHG

## 2021-03-11 DIAGNOSIS — N52.01 ERECTILE DYSFUNCTION DUE TO ARTERIAL INSUFFICIENCY: ICD-10-CM

## 2021-03-11 DIAGNOSIS — N39.3 STRESS INCONTINENCE OF URINE: ICD-10-CM

## 2021-03-11 DIAGNOSIS — C61 PROSTATE CANCER (HCC): Primary | ICD-10-CM

## 2021-03-11 LAB — STATUS: NORMAL

## 2021-03-11 PROCEDURE — 99214 OFFICE O/P EST MOD 30 MIN: CPT | Performed by: UROLOGY

## 2021-03-11 ASSESSMENT — ENCOUNTER SYMPTOMS
NAUSEA: 0
SHORTNESS OF BREATH: 0
DIARRHEA: 0
BACK PAIN: 0
ABDOMINAL PAIN: 0
CONSTIPATION: 0
VOMITING: 0
EYE PAIN: 0
COUGH: 0
WHEEZING: 0
EYE REDNESS: 0

## 2021-03-11 NOTE — PROGRESS NOTES
[address] [address]  Dept: [address] [facility] Urology Office Note - Established    Patient:  Patel Whitaker  YOB: 1960  Date: 3/11/2021    The patient is a 56 y.o. male who presents todayfor evaluation of the following problems:   Chief Complaint   Patient presents with    Prostate Cancer     3 months PSA discuss surgery \" when sitting on hard surfaces start having blood in urine\"        HPI  Here for post op incontinence with standing that has not improved with medication, PFT. Using 2-4 briefs per day. He continues to leak. He continues have intermittent hematuria if he sits too long. He is having no erection, Cialis 5 mg daily. The vacuum pump hurts him so he is not interested inusing it. Incontinence is a chronic problem with exacerbation as is ED    Summary of old records: N/A    Additional History: N/A    Procedures Today: N/A    Urinalysis today:  No results found for this visit on 03/11/21. Last several PSA's:  No results found for: PSA  Last total testosterone:  No results found for: TESTOSTERONE    AUA Symptom Score (3/11/2021):   INCOMPLETE EMPTYING: How often have you had the sensation of not emptying your bladder?: Not at all  FREQUENCY: How often do you have to urinate less than every two hours?: Almost always  INTERMITTENCY: How often have you found you stopped and started again several times when you urinated?: Less than 1 to 5 times  URGENCY: How often have you found it difficult to postpone urination?: Not at all  WEAK STREAM: How often have you had a weak urinary stream?: Less than 1 to 5 times  STRAINING: How often have you had to strain to start  urination?: Not at all  NOCTURIA: How many times did you typically get up at night to uriniate?: 1 Time  TOTAL I-PSS SCORE[de-identified] 8  How would you feel if you were to spend the rest of your life with your urinary condition?: by mouth daily      psyllium (METAMUCIL) 0.52 G capsule Take 0.52 g by mouth daily         Dye [iodides], Sulfamethoxazole, and Ciprofloxacin  Social History     Tobacco Use   Smoking Status Never Smoker   Smokeless Tobacco Never Used     (Ifpatient a smoker, smoking cessation counseling offered)    Social History     Substance and Sexual Activity   Alcohol Use No       REVIEW OF SYSTEMS:  Review of Systems    Physical Exam:      Vitals:    03/11/21 1338   BP: 107/75   Pulse: 93   Temp: 98.1 °F (36.7 °C)     Body mass index is 25.09 kg/m². Patient is a 56 y.o. male in no acute distress and alert and oriented to person, place and time. Physical Exam  Constitutional: Patient in no acute distress. Neuro: Alert and oriented to person, place and time. Psych: Mood normal, affect normal  Skin: No rash noted  HEENT: Head: Normocephalic andatraumatic  Conjunctivae and EOM are normal. Pupils are equal, round  Nose:Normal  Right External Ear: Normal; Left External Ear: Normal  Mouth: Mucosa Moist  Neck: Supple  Lungs: Respiratory effort is normal  Cardiovascular: Warm & Pink  Abdomen: Soft, non-tender, non-distended with no CVA,  No flank tenderness,  Or hepatosplenomegaly   Lymphatics: No palpablelymphadenopathy. Assessment and Plan      1. Prostate cancer ([address]    2. Stress incontinence of urine    3. Erectile dysfunction due to arterial insufficiency           Plan:     trimix and teaching asap. Discussed this med in detail   Schedule aus at [employer] in late may with alberto present only    Return for Surgery. Prescriptions Ordered:  No orders of the defined types were placed in this encounter. Orders Placed:  No orders of the defined types were placed in this encounter. Shruthi Kaufman MD    Agree with the ROS entered by the MA.

## 2021-03-16 ENCOUNTER — OFFICE VISIT (OUTPATIENT)
Dept: UROLOGY | Age: 61
End: 2021-03-16
Payer: COMMERCIAL

## 2021-03-16 VITALS
DIASTOLIC BLOOD PRESSURE: 74 MMHG | SYSTOLIC BLOOD PRESSURE: 125 MMHG | HEART RATE: 76 BPM | TEMPERATURE: 98.2 F | WEIGHT: 165 LBS | BODY MASS INDEX: 25.01 KG/M2 | HEIGHT: 68 IN

## 2021-03-16 DIAGNOSIS — N52.31 ERECTILE DYSFUNCTION AFTER RADICAL PROSTATECTOMY: Primary | ICD-10-CM

## 2021-03-16 DIAGNOSIS — Z90.79 S/P PROSTATECTOMY: ICD-10-CM

## 2021-03-16 PROCEDURE — 99214 OFFICE O/P EST MOD 30 MIN: CPT | Performed by: NURSE PRACTITIONER

## 2021-03-16 ASSESSMENT — ENCOUNTER SYMPTOMS
VOMITING: 0
ABDOMINAL PAIN: 0
BACK PAIN: 0
COUGH: 0
EYE REDNESS: 0
SHORTNESS OF BREATH: 0
DIARRHEA: 0
WHEEZING: 0
CONSTIPATION: 0
EYE PAIN: 0
NAUSEA: 0

## 2021-03-16 NOTE — PATIENT INSTRUCTIONS
site rotation, use of injection no more that 3x per week- only 1 injection on a given day. Do not titrate or change dose without checking with provider. If erection lasts longer that 4 hours- go to ER and let them know medication you used. Call with questions, or concerns. Pt verbalized understanding- demonstrated understanding and injection of Trimix 0.15 ml self administered at visit.     Stop Cialis

## 2021-03-16 NOTE — PROGRESS NOTES
[address] [address]  Dept: [address] [facility] Urology Office Note - Established    Patient:  Beverly Friedman  YOB: 1960  Date: 3/16/2021    The patient is a 56 y.o. male who presents todayfor evaluation of the following problems:   Chief Complaint   Patient presents with    Erectile Dysfunction     Tri-mx teaching        HPI  Here for Trimix teaching for ED SP RRP. - brought own medication and syringes. Diana Conley He is using Cialis 5 mg daily without improvement. . The vacuum pump was painful and caused bruising so he was unable to use it. . He had some degree of ED before surgery. He continues to leak and is planning to have a AUS. 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 Brought own medication and syringes to visit today. Teaching included:  handwashing, expiration date verification, storage of medication, once vial opened expiration date, medication bottle prep, insertion of air into bottle, drawing up medication - starting dosage, checking/eliminating air in syringe, site selection, skin prep, injection, syringe withdrawal and discard, site pressure, site rotation, use of injection no more that 3x per week- only 1 injection on a given day. Do not titrate or change dose without checking with provider. If erection lasts longer that 4 hours- go to ER and let them know medication you used. Call with questions, or concerns. Pt verbalized understanding- demonstrated understanding and injection of Trimix 0.15 ml self administered at visit. Procedures Today: N/A    Urinalysis today:  No results found for this visit on 03/16/21. Last several PSA's:  No results found for: PSA  Last total testosterone:  No results found for: TESTOSTERONE    AUA Symptom Score (3/16/2021):   INCOMPLETE EMPTYING: How often have you had the sensation of not emptying your bladder?: Not at all  FREQUENCY: How often do you have to urinate less than every two hours?: Not at all  INTERMITTENCY: How often have you found you stopped and started again several times when you urinated?: Less than 1 to 5 times  URGENCY: How often have you found it difficult to postpone urination?: Less than 1 to 5 times  WEAK STREAM: How often have you had a weak urinary stream?: About Half the time  STRAINING: How often have you had to strain to start  urination?: Not at all  NOCTURIA: How many times did you typically get up at night to uriniate?: 1 Time  TOTAL I-PSS SCORE[de-identified] 6  How would you feel if you were to spend the rest of your life with your urinary condition?: Mostly Dissatisfied    Last BUN and creatinine:  Lab Results   Component Value Date    BUN 11 03/05/2020     Lab Results   Component Value Date    CREATININE 0.85 03/05/2020       Additional Lab/Culture results:     Imaging Reviewed during this Office Visit:   (results were independently reviewed by physician and radiology report verified)    PAST MEDICAL, FAMILY AND SOCIAL HISTORY UPDATE:  Past Medical History:   Diagnosis Date    Colon polyps     Diverticulitis     Hyperlipidemia     Dr. Reji Vargas [facility])     Wears prescription eyeglasses     Wellness examination     Dr. Yoshi Grady last seen 12/31/2019     Past Surgical History:   Procedure Laterality Date    COLONOSCOPY  2012, 2017    PROSTATE BIOPSY N/A     2/7/2020    PROSTATECTOMY  03/04/2020    XI ROBOTIC LAPAROSCOPIC, WITH LYMPH NODE DISSECTION     PROSTATECTOMY N/A 3/4/2020    XI ROBOTIC LAPAROSCOPIC PROSTATECTOMY WITH LYMPH NODE DISSECTION performed by Ricardo Caraballo MD at [address] [place] [address]       Family History   Problem Relation Age of Onset    Prostate Cancer Brother         bladder and prostate    Colon Cancer Mother         lung    Heart Disease Father     Heart Attack Father     COPD Father         COPD     Outpatient Medications Marked as Taking for the 3/16/21 encounter (Office Visit) with Ilene Merlin, APRN - CNP   Medication Sig Dispense Refill    sertraline (ZOLOFT) 100 MG tablet       tadalafil (CIALIS) 5 MG tablet 1 daily,  45 min Prior to activity take 20 mg for that day. 45 tablet 11    loratadine (CLARITIN) 10 MG tablet       zolpidem (AMBIEN) 10 MG tablet       omeprazole (PRILOSEC) 20 MG delayed release capsule Take 20 mg by mouth Daily      sertraline (ZOLOFT) 50 MG tablet Take 50 mg by mouth daily       traMADol (ULTRAM) 50 MG tablet       vitamin D 25 MCG (1000 UT) CAPS Take by mouth      fenofibrate 160 MG tablet Take 160 mg by mouth daily      Ascorbic Acid (VITAMIN C) 500 MG tablet Take 500 mg by mouth daily      psyllium (METAMUCIL) 0.52 G capsule Take 0.52 g by mouth daily         Dye [iodides], Sulfamethoxazole, and Ciprofloxacin  Social History     Tobacco Use   Smoking Status Never Smoker   Smokeless Tobacco Never Used     (Ifpatient a smoker, smoking cessation counseling offered)    Social History     Substance and Sexual Activity   Alcohol Use No       REVIEW OF SYSTEMS:  Review of Systems    Physical Exam:      Vitals:    03/16/21 1634   BP: 125/74   Pulse: 76   Temp: 98.2 °F (36.8 °C)     Body mass index is 25.09 kg/m². Patient is a 56 y.o. male in no acute distress and alert and oriented to person, place and time. Physical Exam  Constitutional: Patient in no acute distress. Neuro: Alert and oriented to person, place and time. Psych: Mood normal, affect normal  Skin: No rash noted  HEENT: Head: Normocephalic andatraumatic  Conjunctivae and EOM are normal. Pupils are equal, round  Nose:Normal  Right External Ear: Normal; Left External Ear: Normal  Mouth: Mucosa Moist  Neck: Supple  Lungs: Respiratory effort is normal  Cardiovascular: Warm & Pink  Abdomen: Soft, non-tender, non-distended  Bladder non-tender and not distended.   Musculoskeletal: Normal gait and station      Assessment and Plan      1. Erectile dysfunction after radical prostatectomy    2. S/P prostatectomy           Plan:     Trimix teaching with pt/spouse:    site rotation rt/ lt/ rt/ Lt , use of injection no more that 3x per week- only 1 injection on a given day. Do not titrate or change dose without checking with provider. If erection lasts longer that 4 hours- go to ER and let them know medication you used. Call with questions, or concerns. Pt verbalized understanding- demonstrated understanding and injection of Trimix 0.15 ml self administered at visit. Stop Cialis     Return if symptoms worsen or fail to improve, for as scheduled post op visit. Prescriptions Ordered:  No orders of the defined types were placed in this encounter. Orders Placed:  No orders of the defined types were placed in this encounter. MARTINEZ Joe CNP    reveiwed and Agree with the ROS entered by the MA.

## 2021-03-29 ENCOUNTER — TELEPHONE (OUTPATIENT)
Dept: UROLOGY | Age: 61
End: 2021-03-29

## 2021-04-26 NOTE — TELEPHONE ENCOUNTER
Procedure moved to 12:30pm, 10:30am arrival. Spoke with patient. Patient understands all procedure time changes.

## 2021-05-21 ENCOUNTER — OFFICE VISIT (OUTPATIENT)
Dept: UROLOGY | Age: 61
End: 2021-05-21

## 2021-05-21 ENCOUNTER — TELEPHONE (OUTPATIENT)
Dept: UROLOGY | Age: 61
End: 2021-05-21

## 2021-05-21 DIAGNOSIS — C61 PROSTATE CANCER (HCC): Primary | ICD-10-CM

## 2021-05-21 PROCEDURE — 99999 PR OFFICE/OUTPT VISIT,PROCEDURE ONLY: CPT | Performed by: UROLOGY

## 2021-05-21 NOTE — TELEPHONE ENCOUNTER
Per Dr. Gallo Richter pt to be strict with ABX, ice 6 times a day 30 min each, okay to shower starting tonight, follow up with Jorden Sharpe next week. Pt notified verbal understanding given call ended.

## 2021-05-21 NOTE — PROGRESS NOTES
Patient presents to office, per Dr. Massey General balloon was deflated and 22F cath was removed with no complication. Patient tolerated well with no complaint.

## 2021-05-26 ENCOUNTER — OFFICE VISIT (OUTPATIENT)
Dept: UROLOGY | Age: 61
End: 2021-05-26

## 2021-05-26 VITALS
DIASTOLIC BLOOD PRESSURE: 78 MMHG | SYSTOLIC BLOOD PRESSURE: 147 MMHG | HEART RATE: 70 BPM | WEIGHT: 165 LBS | HEIGHT: 68 IN | TEMPERATURE: 96.9 F | BODY MASS INDEX: 25.01 KG/M2

## 2021-05-26 DIAGNOSIS — G89.18 POST-OP PAIN: ICD-10-CM

## 2021-05-26 DIAGNOSIS — N39.46 MIXED INCONTINENCE URGE AND STRESS: Primary | ICD-10-CM

## 2021-05-26 PROCEDURE — 99024 POSTOP FOLLOW-UP VISIT: CPT | Performed by: NURSE PRACTITIONER

## 2021-05-26 RX ORDER — HYDROCODONE BITARTRATE AND ACETAMINOPHEN 5; 325 MG/1; MG/1
1 TABLET ORAL EVERY 8 HOURS PRN
Qty: 10 TABLET | Refills: 0 | Status: SHIPPED | OUTPATIENT
Start: 2021-05-26 | End: 2021-05-29

## 2021-05-26 ASSESSMENT — ENCOUNTER SYMPTOMS
ABDOMINAL PAIN: 0
EYE REDNESS: 0
CONSTIPATION: 0
DIARRHEA: 0
GASTROINTESTINAL NEGATIVE: 1
WHEEZING: 0
SHORTNESS OF BREATH: 0
EYES NEGATIVE: 1
COUGH: 0
EYE PAIN: 0
NAUSEA: 0
BACK PAIN: 0
RESPIRATORY NEGATIVE: 1
VOMITING: 0

## 2021-05-26 NOTE — PROGRESS NOTES
Pre chart completed will discuss immunizations at visit.   Review of Systems   Constitutional: Negative. Negative for appetite change, chills and fatigue. Eyes: Negative. Negative for pain, redness and visual disturbance. Respiratory: Negative. Negative for cough, shortness of breath and wheezing. Cardiovascular: Negative. Negative for chest pain and leg swelling. Gastrointestinal: Negative. Negative for abdominal pain, constipation, diarrhea, nausea and vomiting. Genitourinary: Negative. Negative for difficulty urinating, dysuria, flank pain, frequency, hematuria and urgency. Musculoskeletal: Negative. Negative for back pain, joint swelling and myalgias. Skin: Negative. Negative for rash and wound. Neurological: Negative. Negative for dizziness, weakness and numbness. Hematological: Negative. Does not bruise/bleed easily.

## 2021-05-26 NOTE — PATIENT INSTRUCTIONS
continue with lifting restrictions and icing as discussed     For post op pain- may use Norco sparingly     Post-op appt as scheduled and as needed.

## 2021-05-26 NOTE — PROGRESS NOTES
[address] [address]  Dept: 0520 [facility] Urology Office Note - Established    Patient:  Obdulia Higgins  YOB: 1960  Date: 5/26/2021    The patient is a 56 y.o. male who presents todayfor evaluation of the following problems:   Chief Complaint   Patient presents with    Post-Op Check     Post op f/u per        HPI  Here post sphincter implant done 5/20/21. His sphincter is open and will be for 4 weeks. He continues AB through Thursday. No fevers. Some testicular swelling. He continues to have scrotal pain, would like a refill on pain meds, using sparingly. Summary of old records: N/A    Additional History: N/A    Procedures Today: N/A    Urinalysis today:  No results found for this visit on 05/26/21.   Last several PSA's:  No results found for: PSA  Last total testosterone:  No results found for: TESTOSTERONE    AUA Symptom Score (5/26/2021):                               Last BUN and creatinine:  Lab Results   Component Value Date    BUN 11 03/05/2020     Lab Results   Component Value Date    CREATININE 0.85 03/05/2020       Additional Lab/Culture results: Imaging Reviewed during this Office Visit:(results were independently reviewed by physician and radiology report verified)    PAST MEDICAL, FAMILY AND SOCIAL HISTORY UPDATE:  Past Medical History:   Diagnosis Date    Colon polyps     Diverticulitis     Hyperlipidemia     Dr. Brett Bagley [facility])     Wears prescription eyeglasses     Wellness examination     Dr. Berhane Nowak last seen 12/31/2019     Past Surgical History:   Procedure Laterality Date    COLONOSCOPY  2012, 2017    PROSTATE BIOPSY N/A     2/7/2020    PROSTATECTOMY  03/04/2020    XI ROBOTIC LAPAROSCOPIC, WITH LYMPH NODE DISSECTION     PROSTATECTOMY N/A 3/4/2020    XI ROBOTIC LAPAROSCOPIC PROSTATECTOMY WITH LYMPH NODE DISSECTION performed by Reny Solis, MD at [address]       Family History   Problem Relation Age of Onset    Prostate Cancer Brother         bladder and prostate    Colon Cancer Mother         lung    Heart Disease Father     Heart Attack Father     COPD Father         COPD     Outpatient Medications Marked as Taking for the 5/26/21 encounter (Office Visit) with MARTINEZ Roper - CNP   Medication Sig Dispense Refill    HYDROcodone-acetaminophen (NORCO) 5-325 MG per tablet Take 1 tablet by mouth every 8 hours as needed for Pain for up to 3 days. Intended supply: 3 days. Take lowest dose possible to manage pain 10 tablet 0    sertraline (ZOLOFT) 100 MG tablet       loratadine (CLARITIN) 10 MG tablet       zolpidem (AMBIEN) 10 MG tablet       omeprazole (PRILOSEC) 20 MG delayed release capsule Take 20 mg by mouth Daily      vitamin D 25 MCG (1000 UT) CAPS Take by mouth      fenofibrate 160 MG tablet Take 160 mg by mouth daily      Ascorbic Acid (VITAMIN C) 500 MG tablet Take 500 mg by mouth daily         Dye [iodides], Sulfamethoxazole, and Ciprofloxacin  Social History     Tobacco Use   Smoking Status Never Smoker   Smokeless Tobacco Never Used     (Ifpatient a smoker, smoking cessation counseling offered)    Social History     Substance and Sexual Activity   Alcohol Use No       REVIEW OF SYSTEMS:  Review of Systems    Physical Exam:      Vitals:    05/26/21 0852   BP: (!) 147/78   Pulse: 70   Temp: 96.9 °F (36.1 °C)     Body mass index is 25.09 kg/m². Patient is a 56 y.o. male in no acute distress and alert and oriented to person, place and time. Physical Exam  Constitutional: Patient in no acute distress. Neuro: Alert and oriented to person, place and time.   Psych: Mood normal, affect normal  Skin: No rash noted  HEENT: Head: Normocephalic andatraumatic  Conjunctivae and EOM are normal. Pupils are equal, round  Nose:Normal  Right External Ear: Normal; Left External Ear: Normal  Mouth: Mucosa Moist  Neck: Supple  Lungs: Respiratory effort is normal  Cardiovascular: Warm & Pink  Abdomen: Soft, non-tender, non-distended  Bladder non-tender and not distended. Musculoskeletal: Normal gait and station  Testiscles: contusion, small edema     Assessment and Plan      1. Mixed incontinence urge and stress    2. Post-op pain           Plan:     continue with lifting restrictions and icing as discussed     For post op pain- may use Norco sparingly     Post-op appt as scheduled and as needed. Return for as scheduled post op visit. Prescriptions Ordered:  Orders Placed This Encounter   Medications    HYDROcodone-acetaminophen (NORCO) 5-325 MG per tablet     Sig: Take 1 tablet by mouth every 8 hours as needed for Pain for up to 3 days. Intended supply: 3 days. Take lowest dose possible to manage pain     Dispense:  10 tablet     Refill:  0     Reduce doses taken as pain becomes manageable     Orders Placed:  No orders of the defined types were placed in this encounter. MARTINEZ Canseco CNP    Reviewed and Agree with the ROS entered by the MA.

## 2021-05-27 ENCOUNTER — TELEPHONE (OUTPATIENT)
Dept: UROLOGY | Age: 61
End: 2021-05-27

## 2021-05-27 NOTE — TELEPHONE ENCOUNTER
Dr Juarez Mitchell would like to see Mauro in a few weeks please. 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 The RTW slip then can be generated and faxed. He will need to be seen before June 21st- this is when he as sched to go back to work- he is not ready to return to work as he is healing still.

## 2021-06-10 ENCOUNTER — OFFICE VISIT (OUTPATIENT)
Dept: UROLOGY | Age: 61
End: 2021-06-10

## 2021-06-10 VITALS
SYSTOLIC BLOOD PRESSURE: 139 MMHG | HEART RATE: 71 BPM | BODY MASS INDEX: 25.01 KG/M2 | DIASTOLIC BLOOD PRESSURE: 86 MMHG | TEMPERATURE: 97.6 F | HEIGHT: 68 IN | WEIGHT: 165 LBS

## 2021-06-10 DIAGNOSIS — N39.3 STRESS INCONTINENCE OF URINE: Primary | ICD-10-CM

## 2021-06-10 PROCEDURE — 99024 POSTOP FOLLOW-UP VISIT: CPT | Performed by: UROLOGY

## 2021-06-10 ASSESSMENT — ENCOUNTER SYMPTOMS
NAUSEA: 0
BACK PAIN: 0
COUGH: 0
ABDOMINAL PAIN: 0
SHORTNESS OF BREATH: 0
CONSTIPATION: 0
DIARRHEA: 0
WHEEZING: 0
EYE REDNESS: 0
EYE PAIN: 0
VOMITING: 0

## 2021-06-10 NOTE — PROGRESS NOTES
Review of Systems   Constitutional: Negative for chills, fatigue and fever. Eyes: Negative for pain, redness and visual disturbance. Respiratory: Negative for cough, shortness of breath and wheezing. Cardiovascular: Negative for chest pain and leg swelling. Gastrointestinal: Negative for abdominal pain, constipation, diarrhea, nausea and vomiting. Genitourinary: Positive for frequency and scrotal swelling. Negative for difficulty urinating, dysuria, flank pain, hematuria, testicular pain and urgency. Musculoskeletal: Negative for back pain, joint swelling and myalgias. Skin: Negative for rash and wound. Neurological: Negative for dizziness, tremors and numbness. Hematological: Does not bruise/bleed easily.

## 2021-06-10 NOTE — PROGRESS NOTES
hx prostate CA  no radiation. Originally tried PFT but did not have improvements, had AUS placed with Dr. Rowena Velasquez on 5/20/2021. wearing 3-4 pads/day  taking trimix with good results.

## 2021-06-10 NOTE — PROGRESS NOTES
[address] [address]  Dept: 92 [address] Junior [facility] Urology Office Note - Established    Patient:  Vicente Quintana  YOB: 1960  Date: 6/10/2021    The patient is a 56 y.o. male who presents todayfor evaluation of the following problems:   Chief Complaint   Patient presents with    Post-Op Check     4 week check up. AUS done 5/20. states that he is due to return to work on 6/21 and he feels he is not ready to return then        HPI  Here for f/u of AUS done 5/20/21. Pt had prostatectomy 3/4/2020 and had failed PFT for leakage. Since AUS placement- overall doing well, only frustration is leak but otherwise urinating without complications. Feels site is healing well, denies concerns for infection or pain. Summary of old records: N/A    Additional History: N/A    Procedures Today: N/A    Urinalysis today:  No results found for this visit on 06/10/21. Last several PSA's:  No results found for: PSA  Last total testosterone:  No results found for: TESTOSTERONE    AUA Symptom Score (6/10/2021):   INCOMPLETE EMPTYING: How often have you had the sensation of not emptying your bladder?: Not at all  FREQUENCY: How often do you have to urinate less than every two hours?: Almost always  INTERMITTENCY: How often have you found you stopped and started again several times when you urinated?: About Half the time  URGENCY: How often have you found it difficult to postpone urination?: About Half the time  WEAK STREAM: How often have you had a weak urinary stream?: Not at all  STRAINING: How often have you had to strain to start  urination?: Less than 1 to 5 times        How would you feel if you were to spend the rest of your life with your urinary condition?: Terrible    Last BUN and creatinine:  Lab Results   Component Value Date    BUN 11 03/05/2020     Lab Results   Component Value Date    CREATININE 0.85 03/05/2020 Additional Lab/Culture results:     Imaging Reviewed during this Office Visit:   (results were independently reviewed by physician and radiology report verified)    PAST MEDICAL, FAMILY AND SOCIAL HISTORY UPDATE:  Past Medical History:   Diagnosis Date    Colon polyps     Diverticulitis     Hyperlipidemia     Dr. Olvin Benitez [facility])    Maribel Llanes Wears prescription eyeglasses     Wellness examination     Dr. Shiela Childress last seen 12/31/2019     Past Surgical History:   Procedure Laterality Date    COLONOSCOPY  2012, 2017    PROSTATE BIOPSY N/A     2/7/2020    PROSTATECTOMY  03/04/2020    XI ROBOTIC LAPAROSCOPIC, WITH LYMPH NODE DISSECTION     PROSTATECTOMY N/A 3/4/2020    XI ROBOTIC LAPAROSCOPIC PROSTATECTOMY WITH LYMPH NODE DISSECTION performed by Marty Pena MD at [address] [place] [address]       Family History   Problem Relation Age of Onset    Prostate Cancer Brother         bladder and prostate    Colon Cancer Mother         lung    Heart Disease Father     Heart Attack Father     COPD Father         COPD     Outpatient Medications Marked as Taking for the 6/10/21 encounter (Office Visit) with Marty Pena MD   Medication Sig Dispense Refill    sertraline (ZOLOFT) 100 MG tablet       loratadine (CLARITIN) 10 MG tablet       zolpidem (AMBIEN) 10 MG tablet       omeprazole (PRILOSEC) 20 MG delayed release capsule Take 20 mg by mouth Daily      vitamin D 25 MCG (1000 UT) CAPS Take by mouth      fenofibrate 160 MG tablet Take 160 mg by mouth daily      Ascorbic Acid (VITAMIN C) 500 MG tablet Take 500 mg by mouth daily      psyllium (METAMUCIL) 0.52 G capsule Take 0.52 g by mouth daily          Dye [iodides], Sulfamethoxazole, and Ciprofloxacin  Social History     Tobacco Use   Smoking Status Never Smoker   Smokeless Tobacco Never Used     (Ifpatient a smoker, smoking cessation counseling offered)    Social History     Substance and Sexual Activity   Alcohol Use No       REVIEW OF SYSTEMS:  Review of Systems    Physical Exam:      Vitals:    06/10/21 0933   BP: 139/86   Pulse: 71   Temp: 97.6 °F (36.4 °C)     Body mass index is 25.09 kg/m². Patient is a 56 y.o. male in no acute distress and alert and oriented to person, place and time. Physical Exam  Constitutional: Patient in no acute distress. Neuro: Alert and oriented to person, place and time. Psych: Mood normal, affect normal  Skin: No rash noted  HEENT: Head: Normocephalic andatraumatic  Conjunctivae and EOM are normal. Pupils are equal, round  Nose:Normal  Right External Ear: Normal; Left External Ear: Normal  Mouth: Mucosa Moist  Neck: Supple  Lungs: Respiratory effort is normal  Cardiovascular: Warm & Pink  Abdomen: Soft, non-tender, non-distended  Bladder non-tender and not distended. Musculoskeletal: Normal gait and station  Testicles: Appears normal bilaterally, no edema or erythema. Surgical site appears to be healing well. Small amount of edema to right groin- no erythema, no drainage. Assessment and Plan      1. Stress incontinence of urine           Plan:      - Surgical site looks good, continue to monitor and notify is s/s of infection or any increased pain. - Keep f/u appt in 2 weeks  -extended time off of work, [employer] paperwork was given to [place]. Prescriptions Ordered:  No orders of the defined types were placed in this encounter. Orders Placed:  No orders of the defined types were placed in this encounter. Eun Gallo MD    Reviewed and agree with the ROS entered by the MA.

## 2021-07-01 ENCOUNTER — OFFICE VISIT (OUTPATIENT)
Dept: UROLOGY | Age: 61
End: 2021-07-01
Payer: COMMERCIAL

## 2021-07-01 VITALS — WEIGHT: 165 LBS | TEMPERATURE: 97.4 F | BODY MASS INDEX: 25.01 KG/M2 | HEIGHT: 68 IN

## 2021-07-01 DIAGNOSIS — R30.0 DYSURIA: Primary | ICD-10-CM

## 2021-07-01 LAB
BILIRUBIN, POC: NORMAL
BLOOD URINE, POC: NORMAL
CLARITY, POC: CLEAR
COLOR, POC: YELLOW
GLUCOSE URINE, POC: NORMAL
KETONES, POC: NORMAL
LEUKOCYTE EST, POC: NORMAL
NITRITE, POC: NORMAL
PH, POC: NORMAL
PROTEIN, POC: NORMAL
SPECIFIC GRAVITY, POC: NORMAL
UROBILINOGEN, POC: NORMAL

## 2021-07-01 PROCEDURE — 81002 URINALYSIS NONAUTO W/O SCOPE: CPT | Performed by: UROLOGY

## 2021-07-01 PROCEDURE — 99024 POSTOP FOLLOW-UP VISIT: CPT | Performed by: UROLOGY

## 2021-07-01 RX ORDER — TADALAFIL 5 MG/1
TABLET ORAL
COMMUNITY
Start: 2021-06-23 | End: 2022-02-07

## 2021-07-01 ASSESSMENT — ENCOUNTER SYMPTOMS
EYE PAIN: 0
EYE REDNESS: 0
DIARRHEA: 0
BACK PAIN: 0
WHEEZING: 0
COUGH: 0
ABDOMINAL PAIN: 0
SHORTNESS OF BREATH: 0
VOMITING: 0
NAUSEA: 0
CONSTIPATION: 0

## 2021-07-01 NOTE — PROGRESS NOTES
Review of Systems   Constitutional: Negative for appetite change, chills and fatigue. Eyes: Negative for pain, redness and visual disturbance. Respiratory: Negative for cough, shortness of breath and wheezing. Cardiovascular: Negative for chest pain and leg swelling. Gastrointestinal: Negative for abdominal pain, constipation, diarrhea, nausea and vomiting. Genitourinary: Positive for dysuria. Negative for difficulty urinating, flank pain, frequency, hematuria and urgency. Musculoskeletal: Negative for back pain, joint swelling and myalgias. Skin: Negative for rash and wound. Neurological: Negative for dizziness, weakness and numbness. Hematological: Does not bruise/bleed easily.

## 2021-08-25 ENCOUNTER — TELEPHONE (OUTPATIENT)
Dept: UROLOGY | Age: 61
End: 2021-08-25

## 2021-09-07 ENCOUNTER — TELEPHONE (OUTPATIENT)
Dept: UROLOGY | Age: 61
End: 2021-09-07

## 2021-09-07 NOTE — TELEPHONE ENCOUNTER
Patient is calling to speak with the clinical team in regards to Colonoscopy that he is scheduled to have. Patient states that he has a  Sphincter, and would like to know if he needs to turn it off or anything.  Please advise

## 2021-09-08 DIAGNOSIS — C61 PROSTATE CANCER (HCC): Primary | ICD-10-CM

## 2021-09-09 ENCOUNTER — HOSPITAL ENCOUNTER (OUTPATIENT)
Dept: PREADMISSION TESTING | Age: 61
Discharge: HOME | End: 2021-09-13
Payer: COMMERCIAL

## 2021-09-09 VITALS — BODY MASS INDEX: 27.4 KG/M2 | HEIGHT: 69 IN | WEIGHT: 185 LBS

## 2021-09-09 LAB — PSA, ULTRASENSITIVE: <0.01 NG/ML (ref 0–4)

## 2021-09-09 RX ORDER — ASPIRIN 81 MG/1
81 TABLET, CHEWABLE ORAL DAILY
COMMUNITY

## 2021-09-09 NOTE — PROGRESS NOTES
Dr. Jamaal Nguyen, anesthesia, was contacted and informed of patient's history and planned surgery. No orders received.

## 2021-09-09 NOTE — PROGRESS NOTES

## 2021-09-09 NOTE — TELEPHONE ENCOUNTER
Per Dr. Sydnie Orosco as long as no catheter no need to turn off. Pt to urinate in pre-op prior to procedure. \" pt notified verbal understanding given.

## 2021-09-16 ENCOUNTER — ANESTHESIA EVENT (OUTPATIENT)
Dept: OPERATING ROOM | Age: 61
End: 2021-09-16
Payer: COMMERCIAL

## 2021-09-17 ENCOUNTER — ANESTHESIA (OUTPATIENT)
Dept: OPERATING ROOM | Age: 61
End: 2021-09-17
Payer: COMMERCIAL

## 2021-09-17 ENCOUNTER — HOSPITAL ENCOUNTER (OUTPATIENT)
Age: 61
Setting detail: OUTPATIENT SURGERY
Discharge: HOME | End: 2021-09-17
Attending: SURGERY | Admitting: SURGERY
Payer: COMMERCIAL

## 2021-09-17 VITALS
DIASTOLIC BLOOD PRESSURE: 88 MMHG | TEMPERATURE: 96.9 F | HEIGHT: 69 IN | WEIGHT: 185 LBS | BODY MASS INDEX: 27.4 KG/M2 | SYSTOLIC BLOOD PRESSURE: 134 MMHG | RESPIRATION RATE: 14 BRPM | OXYGEN SATURATION: 97 % | HEART RATE: 71 BPM

## 2021-09-17 VITALS — DIASTOLIC BLOOD PRESSURE: 58 MMHG | OXYGEN SATURATION: 100 % | SYSTOLIC BLOOD PRESSURE: 90 MMHG

## 2021-09-17 PROCEDURE — 7100000030 HC ASPR PHASE II RECOVERY - FIRST 15 MIN: Performed by: SURGERY

## 2021-09-17 PROCEDURE — 6360000002 HC RX W HCPCS: Performed by: NURSE ANESTHETIST, CERTIFIED REGISTERED

## 2021-09-17 PROCEDURE — 7100000001 HC PACU RECOVERY - ADDTL 15 MIN: Performed by: SURGERY

## 2021-09-17 PROCEDURE — 2709999900 HC NON-CHARGEABLE SUPPLY: Performed by: SURGERY

## 2021-09-17 PROCEDURE — 7100000011 HC PHASE II RECOVERY - ADDTL 15 MIN: Performed by: SURGERY

## 2021-09-17 PROCEDURE — 7100000010 HC PHASE II RECOVERY - FIRST 15 MIN: Performed by: SURGERY

## 2021-09-17 PROCEDURE — 3609027000 HC COLONOSCOPY: Performed by: SURGERY

## 2021-09-17 PROCEDURE — 3700000000 HC ANESTHESIA ATTENDED CARE: Performed by: SURGERY

## 2021-09-17 PROCEDURE — 2500000003 HC RX 250 WO HCPCS: Performed by: NURSE ANESTHETIST, CERTIFIED REGISTERED

## 2021-09-17 PROCEDURE — 2580000003 HC RX 258: Performed by: ANESTHESIOLOGY

## 2021-09-17 PROCEDURE — 7100000031 HC ASPR PHASE II RECOVERY - ADDTL 15 MIN: Performed by: SURGERY

## 2021-09-17 PROCEDURE — 7100000000 HC PACU RECOVERY - FIRST 15 MIN: Performed by: SURGERY

## 2021-09-17 PROCEDURE — 3700000001 HC ADD 15 MINUTES (ANESTHESIA): Performed by: SURGERY

## 2021-09-17 RX ORDER — SODIUM CHLORIDE 0.9 % (FLUSH) 0.9 %
10 SYRINGE (ML) INJECTION PRN
Status: DISCONTINUED | OUTPATIENT
Start: 2021-09-17 | End: 2021-09-17 | Stop reason: HOSPADM

## 2021-09-17 RX ORDER — ONDANSETRON 2 MG/ML
INJECTION INTRAMUSCULAR; INTRAVENOUS PRN
Status: DISCONTINUED | OUTPATIENT
Start: 2021-09-17 | End: 2021-09-17 | Stop reason: SDUPTHER

## 2021-09-17 RX ORDER — MEPERIDINE HYDROCHLORIDE 25 MG/ML
12.5 INJECTION INTRAMUSCULAR; INTRAVENOUS; SUBCUTANEOUS EVERY 5 MIN PRN
Status: DISCONTINUED | OUTPATIENT
Start: 2021-09-17 | End: 2021-09-17 | Stop reason: HOSPADM

## 2021-09-17 RX ORDER — SODIUM CHLORIDE, SODIUM LACTATE, POTASSIUM CHLORIDE, CALCIUM CHLORIDE 600; 310; 30; 20 MG/100ML; MG/100ML; MG/100ML; MG/100ML
INJECTION, SOLUTION INTRAVENOUS CONTINUOUS
Status: DISCONTINUED | OUTPATIENT
Start: 2021-09-17 | End: 2021-09-17 | Stop reason: HOSPADM

## 2021-09-17 RX ORDER — SODIUM CHLORIDE 9 MG/ML
25 INJECTION, SOLUTION INTRAVENOUS PRN
Status: DISCONTINUED | OUTPATIENT
Start: 2021-09-17 | End: 2021-09-17 | Stop reason: HOSPADM

## 2021-09-17 RX ORDER — ONDANSETRON 2 MG/ML
4 INJECTION INTRAMUSCULAR; INTRAVENOUS
Status: DISCONTINUED | OUTPATIENT
Start: 2021-09-17 | End: 2021-09-17 | Stop reason: HOSPADM

## 2021-09-17 RX ORDER — DIPHENHYDRAMINE HYDROCHLORIDE 50 MG/ML
12.5 INJECTION INTRAMUSCULAR; INTRAVENOUS
Status: DISCONTINUED | OUTPATIENT
Start: 2021-09-17 | End: 2021-09-17 | Stop reason: HOSPADM

## 2021-09-17 RX ORDER — FENTANYL CITRATE 50 UG/ML
INJECTION, SOLUTION INTRAMUSCULAR; INTRAVENOUS PRN
Status: DISCONTINUED | OUTPATIENT
Start: 2021-09-17 | End: 2021-09-17 | Stop reason: SDUPTHER

## 2021-09-17 RX ORDER — MORPHINE SULFATE 2 MG/ML
2 INJECTION, SOLUTION INTRAMUSCULAR; INTRAVENOUS EVERY 5 MIN PRN
Status: DISCONTINUED | OUTPATIENT
Start: 2021-09-17 | End: 2021-09-17 | Stop reason: HOSPADM

## 2021-09-17 RX ORDER — PROPOFOL 10 MG/ML
INJECTION, EMULSION INTRAVENOUS PRN
Status: DISCONTINUED | OUTPATIENT
Start: 2021-09-17 | End: 2021-09-17 | Stop reason: SDUPTHER

## 2021-09-17 RX ORDER — LIDOCAINE HYDROCHLORIDE 10 MG/ML
INJECTION, SOLUTION EPIDURAL; INFILTRATION; INTRACAUDAL; PERINEURAL PRN
Status: DISCONTINUED | OUTPATIENT
Start: 2021-09-17 | End: 2021-09-17 | Stop reason: SDUPTHER

## 2021-09-17 RX ORDER — LABETALOL HYDROCHLORIDE 5 MG/ML
5 INJECTION, SOLUTION INTRAVENOUS EVERY 10 MIN PRN
Status: DISCONTINUED | OUTPATIENT
Start: 2021-09-17 | End: 2021-09-17 | Stop reason: HOSPADM

## 2021-09-17 RX ORDER — DEXAMETHASONE SODIUM PHOSPHATE 4 MG/ML
INJECTION, SOLUTION INTRA-ARTICULAR; INTRALESIONAL; INTRAMUSCULAR; INTRAVENOUS; SOFT TISSUE PRN
Status: DISCONTINUED | OUTPATIENT
Start: 2021-09-17 | End: 2021-09-17 | Stop reason: SDUPTHER

## 2021-09-17 RX ADMIN — PROPOFOL 200 MG: 10 INJECTION, EMULSION INTRAVENOUS at 07:15

## 2021-09-17 RX ADMIN — LIDOCAINE HYDROCHLORIDE 50 MG: 10 INJECTION, SOLUTION EPIDURAL; INFILTRATION; INTRACAUDAL; PERINEURAL at 07:15

## 2021-09-17 RX ADMIN — DEXAMETHASONE SODIUM PHOSPHATE 4 MG: 4 INJECTION, SOLUTION INTRAMUSCULAR; INTRAVENOUS at 07:15

## 2021-09-17 RX ADMIN — SODIUM CHLORIDE, POTASSIUM CHLORIDE, SODIUM LACTATE AND CALCIUM CHLORIDE: 600; 310; 30; 20 INJECTION, SOLUTION INTRAVENOUS at 06:27

## 2021-09-17 RX ADMIN — FENTANYL CITRATE 100 MCG: 50 INJECTION, SOLUTION INTRAMUSCULAR; INTRAVENOUS at 07:15

## 2021-09-17 RX ADMIN — ONDANSETRON 4 MG: 2 INJECTION, SOLUTION INTRAMUSCULAR; INTRAVENOUS at 07:15

## 2021-09-17 ASSESSMENT — PULMONARY FUNCTION TESTS
PIF_VALUE: 11
PIF_VALUE: 15
PIF_VALUE: 14
PIF_VALUE: 29
PIF_VALUE: 12
PIF_VALUE: 8
PIF_VALUE: 11
PIF_VALUE: 11
PIF_VALUE: 2
PIF_VALUE: 11
PIF_VALUE: 11
PIF_VALUE: 2
PIF_VALUE: 1
PIF_VALUE: 11
PIF_VALUE: 1
PIF_VALUE: 1
PIF_VALUE: 12
PIF_VALUE: 14
PIF_VALUE: 13
PIF_VALUE: 12
PIF_VALUE: 11
PIF_VALUE: 12
PIF_VALUE: 11
PIF_VALUE: 15
PIF_VALUE: 13
PIF_VALUE: 11
PIF_VALUE: 11
PIF_VALUE: 13
PIF_VALUE: 12
PIF_VALUE: 29
PIF_VALUE: 11
PIF_VALUE: 15
PIF_VALUE: 11
PIF_VALUE: 1
PIF_VALUE: 13
PIF_VALUE: 12
PIF_VALUE: 11

## 2021-09-17 ASSESSMENT — LIFESTYLE VARIABLES: SMOKING_STATUS: 0

## 2021-09-17 ASSESSMENT — ENCOUNTER SYMPTOMS
SORE THROAT: 0
SHORTNESS OF BREATH: 0
SHORTNESS OF BREATH: 0
COUGH: 0
WHEEZING: 0
STRIDOR: 0

## 2021-09-17 ASSESSMENT — PAIN SCALES - GENERAL
PAINLEVEL_OUTOF10: 0
PAINLEVEL_OUTOF10: 0

## 2021-09-17 ASSESSMENT — PAIN - FUNCTIONAL ASSESSMENT: PAIN_FUNCTIONAL_ASSESSMENT: 0-10

## 2021-09-17 NOTE — ANESTHESIA PRE PROCEDURE
Department of Anesthesiology  Preprocedure Note       Name:  Bipin Campos   Age:  59 y.o.  :  1960                                          MRN:  [id number]         Date:  2021      Surgeon: Marychuy Walsh):  Nolberto King MD    Procedure: Procedure(s):  COLORECTAL CANCER SCREENING, NOT HIGH RISK    Medications prior to admission:   Prior to Admission medications    Medication Sig Start Date End Date Taking? Authorizing Provider   sertraline (ZOLOFT) 100 MG tablet  21  Yes Historical Provider, MD   fenofibrate 160 MG tablet Take 160 mg by mouth daily   Yes Historical Provider, MD   aspirin 81 MG chewable tablet Take 81 mg by mouth daily Stopped temporarily for this procedure. Historical Provider, MD   tadalafil (CIALIS) 5 MG tablet TAKE 1 TABLET BY MOUTH ONCE DAILY. 45 MINUTES PRIOR TO ACTIVITY TAKE 20MG FOR THAT DAY 21   Historical Provider, MD   loratadine (CLARITIN) 10 MG tablet  20   Historical Provider, MD   zolpidem (AMBIEN) 10 MG tablet  20   Historical Provider, MD   omeprazole (PRILOSEC) 20 MG delayed release capsule Take 20 mg by mouth Daily    Historical Provider, MD   vitamin D 25 MCG (1000 UT) CAPS Take by mouth    Historical Provider, MD   Ascorbic Acid (VITAMIN C) 500 MG tablet Take 500 mg by mouth daily    Historical Provider, MD   psyllium (METAMUCIL) 0.52 G capsule Take 0.52 g by mouth daily     Historical Provider, MD       Current medications:    Current Facility-Administered Medications   Medication Dose Route Frequency Provider Last Rate Last Admin    lactated ringers infusion   IntraVENous Continuous [place] Baeza MD        sodium chloride flush 0.9 % injection 10 mL  10 mL IntraVENous PRN Jaja Baeza MD        0.9 % sodium chloride infusion  25 mL IntraVENous PRN Alberto Higgins MD           Allergies:     Allergies   Allergen Reactions    Dye [Iodides]      Rash after cystogram on the groin     Sulfamethoxazole Swelling    Ciprofloxacin Rash Date of last solid food consumption: 09/15/21    BMI:   Wt Readings from Last 3 Encounters:   09/17/21 185 lb (83.9 kg)   09/09/21 185 lb (83.9 kg)   07/01/21 165 lb (74.8 kg)     Body mass index is 27.72 kg/m². CBC:   Lab Results   Component Value Date    WBC 10.5 03/05/2020    RBC 4.06 03/05/2020    HGB 11.7 03/05/2020    HCT 37.3 03/05/2020    MCV 91.9 03/05/2020    RDW 12.6 03/05/2020     03/05/2020     LR    CMP:   Lab Results   Component Value Date     03/05/2020    K 4.1 03/05/2020     03/05/2020    CO2 21 03/05/2020    BUN 11 03/05/2020    CREATININE 0.85 03/05/2020    GFRAA >60 03/05/2020    LABGLOM >60 03/05/2020    GLUCOSE 130 03/05/2020    CALCIUM 8.8 03/05/2020       POC Tests: No results for input(s): POCGLU, POCNA, POCK, POCCL, POCBUN, POCHEMO, POCHCT in the last 72 hours. Coags: No results found for: PROTIME, INR, APTT    HCG (If Applicable): No results found for: PREGTESTUR, PREGSERUM, HCG, HCGQUANT     ABGs: No results found for: PHART, PO2ART, VTQ7GJC, IEH4PBG, BEART, Y8TNKFPB     Type & Screen (If Applicable):  No results found for: LABABO, LABRH    Drug/Infectious Status (If Applicable):  No results found for: HIV, HEPCAB    COVID-19 Screening (If Applicable):   Lab Results   Component Value Date    COVID19 Not Detected 05/18/2020           Anesthesia Evaluation  Patient summary reviewed and Nursing notes reviewed no history of anesthetic complications:   Airway: Mallampati: II  TM distance: >3 FB   Neck ROM: full  Mouth opening: > = 3 FB Dental: normal exam         Pulmonary:normal exam  breath sounds clear to auscultation  (+) sleep apnea: on CPAP,      (-) pneumonia, COPD, asthma, shortness of breath, recent URI, rhonchi, wheezes, rales, stridor, not a current smoker and no decreased breath sounds          Patient did not smoke on day of surgery.                  Cardiovascular:  Exercise tolerance: good (>4 METS),   (+) hyperlipidemia    (-) pacemaker, hypertension, valvular problems/murmurs, past MI, CAD, CABG/stent, dysrhythmias,  angina,  CHF, orthopnea, PND,  WOOTEN, murmur, weak pulses,  friction rub, systolic click, carotid bruit,  JVD, peripheral edema and no pulmonary hypertension    ECG reviewed  Rhythm: regular  Rate: normal           Beta Blocker:  Not on Beta Blocker         Neuro/Psych:   Negative Neuro/Psych ROS     (-) seizures, neuromuscular disease, TIA, CVA, headaches, psychiatric history and depression/anxiety            GI/Hepatic/Renal: Neg GI/Hepatic/Renal ROS       (-) hiatal hernia, GERD, PUD, hepatitis, liver disease, no renal disease, bowel prep and no morbid obesity       Endo/Other:    (+) no malignancy/cancer. (-) diabetes mellitus, hypothyroidism, hyperthyroidism, blood dyscrasia, arthritis, no electrolyte abnormalities, no malignancy/cancer               Abdominal:             Vascular: negative vascular ROS. - PVD, DVT and PE. Other Findings:           Anesthesia Plan      general     ASA 2       Induction: intravenous. MIPS: Postoperative opioids intended and Prophylactic antiemetics administered. Anesthetic plan and risks discussed with patient. Plan discussed with CRNA.                   Sophie Smith MD   9/17/2021

## 2021-09-17 NOTE — OP NOTE
Operative Note      Patient: Rosalinda Benavidez  YOB: 1960  MRN: [id number]    Date of Procedure: 9/17/2021    PROCEDURE NOTE    DATE OF PROCEDURE: 9/17/2021    SURGEON: Flip Rodriguez MD    ASSISTANT: None    PREOPERATIVE DIAGNOSIS: History of colon polyps. History of abdominal pain    POSTOPERATIVE DIAGNOSIS: Grade 1 internal hemorrhoid. Sigmoid and scattered diverticulosis. OPERATION: Total colonoscopy to cecum with intubation of terminal ileum    ANESTHESIA: General    ESTIMATED BLOOD LOSS: None    COMPLICATIONS: None     SPECIMENS:  Was Not Obtained    HISTORY: The patient is a 59y.o. year old male with history of above preop diagnosis. I recommended colonoscopy with possible biopsy or polypectomy and I explained the risk, benefits, expected outcome, and alternatives to the procedure. Risks included but are not limited to bleeding, infection, respiratory distress, hypotension, and perforation of the colon and possibility of missing a lesion. The patient understands and is in agreement. PROCEDURE: The patient was given IV conscious sedation. The patient's SPO2 remained above 90% throughout the procedure. Digital rectal exam was normal.  The colonoscope was inserted through the anus into the rectum and advanced under direct vision to the cecum without difficulty. Terminal ileum was examined for approximately 2 inches. The prep was good. Findings:  Terminal ileum: normal    Cecum/Ascending colon: normal    Transverse colon: abnormal: Diverticulosis    Descending/Sigmoid colon: abnormal: Sigmoid diverticulosis    Rectum/Anus: examined in normal and retroflexed positions and was abnormal: Grade 1 internal hemorrhoid    Withdrawal Time was (minutes): 18      Next screening colonoscopy: 5 years. If screening is less than 10 years the recommended reason is due:History of colon polyps    The colon was decompressed.   While withdrawing the scope the above findings were verified and the scope was removed. The patient tolerated the procedure and conscious sedation without unusual events. In the recovery room patient was examined and remains hemodynamically stable. Discharge home when criteria met. Recommendations/Plan:   1. Discussed with the family  2. High fiber diet   3.  Precautions to avoid constipation    Electronically signed by Dustin Bonner MD  on 9/17/2021 at 7:55 AM

## 2021-09-17 NOTE — ANESTHESIA POSTPROCEDURE EVALUATION
POST- ANESTHESIA EVALUATION       Pt Name: Bipin Campos  MRN: [id number]  YOB: 1960  Date of evaluation: 9/17/2021  Time:  11:16 AM      /88   Pulse 71   Temp 96.9 °F (36.1 °C) (Infrared)   Resp 14   Ht 5' 8.5\" (1.74 m)   Wt 185 lb (83.9 kg)   SpO2 97%   BMI 27.72 kg/m²      Consciousness Level  Awake  Cardiopulmonary Status  Stable  Pain Adequately Treated YES  Nausea / Vomiting  NO  Adequate Hydration  YES  Anesthesia Related Complications NONE      Electronically signed by Juan Peralta MD on 9/17/2021 at 11:16 AM       Department of Anesthesiology  Postprocedure Note    Patient: Bipin Campos  MRN: [id number]  YOB: 1960  Date of evaluation: 9/17/2021  Time:  11:16 AM     Procedure Summary     Date: 09/17/21 Room / Location: [address] [place] [address]    Anesthesia Start: 4120 Anesthesia Stop: 4797    Procedure: COLORECTAL CANCER SCREENING, NOT HIGH RISK (N/A ) Diagnosis: (COLON SCREENING (PT HAS HAD COVID VACCINE DONE))    Surgeons: Nolberto King MD Responsible Provider: Juan Peralta MD    Anesthesia Type: general ASA Status: 2          Anesthesia Type: general    Kahlil Phase I: Kahlil Score: 10    Kahlil Phase II: Kahlil Score: 10    Last vitals: Reviewed and per EMR flowsheets.        Anesthesia Post Evaluation

## 2021-09-17 NOTE — H&P
HISTORY and [id number]       NAME:  Ludmila Roman  MRN: [id number]   YOB: 1960   Date: 9/17/2021   Age: 59 y.o. Gender: male       COMPLAINT AND PRESENT HISTORY:     Mauro Moise is 59 y.o.,male, having a colonoscopy. Pt has had previous colonoscopy with last being in 2017. History of colon polyps. Pt was having intermittent RLQ abdominal pain about one month ago. he did undergo a CT abdomen and pelvis which was negative for acute pelvic pathology. He has history of prostatectomy in 2020 with artificial sphincter placement. He was told by his PCP the pain could have been spasm related the sphincter. He cannot have calabrese catheterization. Denies abdominal pain, heartburn, nausea, vomiting, diarrhea, constipation, hematochezia, melena, decreased appetite and unintended weight loss. Pt has a positive Family Hx of colon cancer. Completed and followed prescribed prep. NPO. No medications taken this am. Stopped vitamins about two week ago. Denies taking any other blood thinning medications. Denies chest pain/pressure, palpitations, SOB, recent URI, fever or chills. PAST MEDICAL HISTORY     Past Medical History:   Diagnosis Date    Colon polyps     COVID-19 vaccine administered 3-, 2-    Aidan Ascencio    Diverticulitis     Hyperlipidemia     Dr. Jamshid Welhs Prostate cancer [facility])     prostate    Sleep apnea     C-PAP AT [facility]    Status post implantation of artificial urinary sphincter 05/20/2021    DR. SEBASTIAN, ACTIVATED ON JULY 1ST. IF PT. EVERY NEEDS TO  BE CATHETERIZED NEEDS DISENGAGEMENT PER. UROLOGIST DR. SEBASTIAN.     Wears prescription eyeglasses     Wellness examination     Dr. Cindi Juarez last seen 12/31/2019       SURGICAL HISTORY       Past Surgical History:   Procedure Laterality Date    COLONOSCOPY  2012, 2017    PROSTATE BIOPSY N/A     2/7/2020    PROSTATECTOMY  03/04/2020    XI ROBOTIC LAPAROSCOPIC, WITH LYMPH NODE DISSECTION     PROSTATECTOMY N/A Emotionally Abused:     Physically Abused:     Sexually Abused:         REVIEW OF SYSTEMS      Allergies   Allergen Reactions    Dye [Iodides]      Rash after cystogram on the groin     Sulfamethoxazole Swelling    Ciprofloxacin Rash       No current facility-administered medications on file prior to encounter. Current Outpatient Medications on File Prior to Encounter   Medication Sig Dispense Refill    tadalafil (CIALIS) 5 MG tablet TAKE 1 TABLET BY MOUTH ONCE DAILY. 45 MINUTES PRIOR TO ACTIVITY TAKE 20MG FOR THAT DAY      sertraline (ZOLOFT) 100 MG tablet       loratadine (CLARITIN) 10 MG tablet       zolpidem (AMBIEN) 10 MG tablet       omeprazole (PRILOSEC) 20 MG delayed release capsule Take 20 mg by mouth Daily      vitamin D 25 MCG (1000 UT) CAPS Take by mouth      fenofibrate 160 MG tablet Take 160 mg by mouth daily      Ascorbic Acid (VITAMIN C) 500 MG tablet Take 500 mg by mouth daily      psyllium (METAMUCIL) 0.52 G capsule Take 0.52 g by mouth daily        Notation: Above medications are not currently reconciled at time of signing this H&P note, to be reconciled in pre-op per RN. Review of Systems   Constitutional: Negative for appetite change, chills, fever and unexpected weight change. HENT: Negative for congestion and sore throat. Eyes: Negative for visual disturbance. Respiratory: Negative for cough, shortness of breath and wheezing. Cardiovascular: Negative for chest pain, palpitations and leg swelling. Gastrointestinal:        See HPI   Genitourinary: Negative. Musculoskeletal: Negative. Skin: Negative. Neurological: Negative for dizziness, light-headedness and headaches. Hematological: Negative. Psychiatric/Behavioral: Negative. GENERAL PHYSICAL EXAM     Vitals: Review vitals per RN flowsheet. Physical Exam  Constitutional:       General: He is not in acute distress.      Appearance: He is not ill-appearing, toxic-appearing or diaphoretic. HENT:      Head: Normocephalic and atraumatic. Nose: Nose normal. No congestion. Mouth/Throat:      Mouth: Mucous membranes are moist.      Pharynx: Oropharynx is clear. No oropharyngeal exudate or posterior oropharyngeal erythema. Eyes:      General: No scleral icterus. Conjunctiva/sclera: Conjunctivae normal.   Cardiovascular:      Rate and Rhythm: Normal rate and regular rhythm. Heart sounds: Normal heart sounds. No murmur heard. No friction rub. No gallop. Pulmonary:      Effort: Pulmonary effort is normal. No respiratory distress. Breath sounds: Normal breath sounds. No wheezing, rhonchi or rales. Abdominal:      General: Bowel sounds are normal. There is no distension. Palpations: Abdomen is soft. Tenderness: There is no abdominal tenderness. There is no guarding. Musculoskeletal:         General: No swelling or tenderness. Cervical back: Neck supple. No tenderness. Right lower leg: No edema. Left lower leg: No edema. Skin:     General: Skin is warm and dry. Coloration: Skin is not jaundiced. Neurological:      General: No focal deficit present. Mental Status: He is alert and oriented to person, place, and time.       Gait: Gait normal.   Psychiatric:         Mood and Affect: Mood normal.        PROVISIONAL DIAGNOSES / SURGERY:      COLON SCREENING     COLORECTAL CANCER SCREENING, NOT HIGH RISK    Patient Active Problem List    Diagnosis Date Noted    Prostate cancer ([facility] 03/04/2020           MARTINEZ Jimenez - CNP on 9/17/2021 at 5:58 AM

## 2021-09-21 ENCOUNTER — OFFICE VISIT (OUTPATIENT)
Dept: UROLOGY | Age: 61
End: 2021-09-21
Payer: COMMERCIAL

## 2021-09-21 VITALS
HEIGHT: 69 IN | BODY MASS INDEX: 27.4 KG/M2 | DIASTOLIC BLOOD PRESSURE: 76 MMHG | HEART RATE: 75 BPM | TEMPERATURE: 98.4 F | SYSTOLIC BLOOD PRESSURE: 128 MMHG | WEIGHT: 185 LBS

## 2021-09-21 DIAGNOSIS — C61 PROSTATE CANCER (HCC): Primary | ICD-10-CM

## 2021-09-21 DIAGNOSIS — N39.3 STRESS INCONTINENCE OF URINE: ICD-10-CM

## 2021-09-21 PROCEDURE — 99213 OFFICE O/P EST LOW 20 MIN: CPT | Performed by: UROLOGY

## 2021-09-21 ASSESSMENT — ENCOUNTER SYMPTOMS
BACK PAIN: 0
RESPIRATORY NEGATIVE: 1
NAUSEA: 0
COUGH: 0
ABDOMINAL PAIN: 1
EYES NEGATIVE: 1
VOMITING: 0
WHEEZING: 0
EYE REDNESS: 0
SHORTNESS OF BREATH: 0
CONSTIPATION: 0
EYE PAIN: 0
DIARRHEA: 0

## 2021-09-21 NOTE — PROGRESS NOTES
[address] [address]  Dept: [address] [facility] Urology Office Note - Established    Patient:  Jaqui Smith  YOB: 1960  Date: 9/21/2021    The patient is a 59 y.o. male who presents todayfor evaluation of the following problems:   Chief Complaint   Patient presents with    Follow-up     september w PSA- per Dr. Braulio Rowe       HPI  Has h/o prostate cancer and urine incontinence. psa is 0.01. no dysuria no hematuira, aus working well. No issues. Summary of old records: N/A    Additional History: N/A    Procedures Today: N/A    Urinalysis today:  No results found for this visit on 09/21/21. Last several PSA's:  No results found for: PSA  Last total testosterone:  No results found for: TESTOSTERONE    AUA Symptom Score (9/21/2021):   INCOMPLETE EMPTYING: How often have you had the sensation of not emptying your bladder?: Not at all  FREQUENCY: How often do you have to urinate less than every two hours?: More than Half the time  INTERMITTENCY: How often have you found you stopped and started again several times when you urinated?: Not at all  URGENCY: How often have you found it difficult to postpone urination?: Not at all  WEAK STREAM: How often have you had a weak urinary stream?: Not at all  STRAINING: How often have you had to strain to start  urination?: Not at all  NOCTURIA: How many times did you typically get up at night to uriniate?: 1 Time  TOTAL I-PSS SCORE[de-identified] 5  How would you feel if you were to spend the rest of your life with your urinary condition?: Pleased    Last BUN and creatinine:  Lab Results   Component Value Date    BUN 11 03/05/2020     Lab Results   Component Value Date    CREATININE 0.85 03/05/2020       Additional Lab/Culture results: none    Imaging Reviewed during this Office Visit: none  (results were independently reviewed by physician and radiology report verified)    PAST MEDICAL, FAMILY AND SOCIAL HISTORY UPDATE:  Past Medical History:   Diagnosis Date    Colon polyps     COVID-19 vaccine administered 3-, 2-    Bermudez Sonu    Diverticulitis     Hyperlipidemia     Dr. Bere Styles Prostate cancer [facility])     prostate    Sleep apnea     C-PAP AT HS    Status post implantation of artificial urinary sphincter 05/20/2021    DR. SEBASTIAN, ACTIVATED ON JULY 1ST. IF PT. EVERY NEEDS TO  BE CATHETERIZED NEEDS DISENGAGEMENT PER. UROLOGIST DR. SEBASTIAN.  Wears prescription eyeglasses     Wellness examination     Dr. Rita Narayan last seen 12/31/2019     Past Surgical History:   Procedure Laterality Date    COLONOSCOPY  2012, 2017    COLONOSCOPY N/A 9/17/2021    COLORECTAL CANCER SCREENING, NOT HIGH RISK performed by Ade Landeros MD at [address] [place] [address] N/A     2/7/2020    PROSTATECTOMY  03/04/2020    XI ROBOTIC LAPAROSCOPIC, WITH LYMPH NODE DISSECTION     PROSTATECTOMY N/A 3/4/2020    XI ROBOTIC LAPAROSCOPIC PROSTATECTOMY WITH LYMPH NODE DISSECTION performed by Sabrina Resendiz MD at [address] [facility]      ARTIFICIAL URINARY SPHINCTER PLACED AND IF PT.-(MEHUL)  EVER NEEDS TO BE CATHETERIZED MUST BE DISENGAGED PER UROLOGIST (DR. Bessie Cohen)     Family History   Problem Relation Age of Onset    Prostate Cancer Brother         bladder and prostate    Colon Cancer Mother         lung    Heart Disease Father     Heart Attack Father     COPD Father         COPD     Outpatient Medications Marked as Taking for the 9/21/21 encounter (Office Visit) with Sabrina Resendiz MD   Medication Sig Dispense Refill    aspirin 81 MG chewable tablet Take 81 mg by mouth daily Stopped temporarily for this procedure.  tadalafil (CIALIS) 5 MG tablet TAKE 1 TABLET BY MOUTH ONCE DAILY.  45 MINUTES PRIOR TO ACTIVITY TAKE 20MG FOR THAT DAY      sertraline (ZOLOFT) 100 MG tablet       loratadine (CLARITIN) 10 MG tablet       zolpidem (AMBIEN) 10 MG tablet       omeprazole (PRILOSEC) 20 MG delayed release capsule Take 20 mg by mouth Daily      vitamin D 25 MCG (1000 UT) CAPS Take by mouth      fenofibrate 160 MG tablet Take 160 mg by mouth daily      Ascorbic Acid (VITAMIN C) 500 MG tablet Take 500 mg by mouth daily      psyllium (METAMUCIL) 0.52 G capsule Take 0.52 g by mouth daily          Dye [iodides], Sulfamethoxazole, and Ciprofloxacin  Social History     Tobacco Use   Smoking Status Never Smoker   Smokeless Tobacco Never Used     (Ifpatient a smoker, smoking cessation counseling offered)    Social History     Substance and Sexual Activity   Alcohol Use No       REVIEW OF SYSTEMS:  Review of Systems    Physical Exam:      Vitals:    09/21/21 1534   BP: 128/76   Pulse: 75   Temp: 98.4 °F (36.9 °C)     Body mass index is 27.72 kg/m². Patient is a 59 y.o. male in no acute distress and alert and oriented to person, place and time. Physical Exam  Constitutional: Patient in no acute distress. Neuro: Alert and oriented to person, place and time. Psych: Mood normal, affect normal  Skin: No rash noted  HEENT: Head: Normocephalic andatraumatic  Conjunctivae and EOM are normal. Pupils are equal, round  Nose:Normal  Right External Ear: Normal; Left External Ear: Normal  Mouth: Mucosa Moist  Neck: Supple  Lungs: Respiratory effort is normal  Cardiovascular: Warm & Pink  Abdomen: Soft, non-tender, non-distended with no CVA,  No flank tenderness,  Or hepatosplenomegaly   Lymphatics: No palpablelymphadenopathy. Assessment and Plan      1. Prostate cancer ([facility] [address]    2. Stress incontinence of urine           Plan:       Return in about 6 months (around 3/21/2022) for Follow up. Prescriptions Ordered:  No orders of the defined types were placed in this encounter.     Orders Placed:  Orders Placed This Encounter   Procedures    PSA, Diagnostic     Standing Status:   Future     Standing Expiration Date:   9/21/2022           Sunny Dial MD    Agree with the ROS entered by lisha JARAMILLO

## 2021-09-21 NOTE — PROGRESS NOTES
Review of Systems   Constitutional: Negative. Negative for appetite change, chills and fatigue. Eyes: Negative. Negative for pain, redness and visual disturbance. Respiratory: Negative. Negative for cough, shortness of breath and wheezing. Cardiovascular: Negative. Negative for chest pain and leg swelling. Gastrointestinal: Positive for abdominal pain (right side). Negative for constipation, diarrhea, nausea and vomiting. Genitourinary: Negative. Negative for difficulty urinating, dysuria, flank pain, frequency, hematuria and urgency. Musculoskeletal: Negative. Negative for back pain, joint swelling and myalgias. Skin: Negative. Negative for rash and wound. Neurological: Negative. Negative for dizziness, weakness and numbness. Hematological: Negative. Does not bruise/bleed easily.

## 2022-02-07 ENCOUNTER — OFFICE VISIT (OUTPATIENT)
Dept: PODIATRY | Age: 62
End: 2022-02-07
Payer: COMMERCIAL

## 2022-02-07 VITALS — HEIGHT: 68 IN | BODY MASS INDEX: 28.04 KG/M2 | WEIGHT: 185 LBS

## 2022-02-07 DIAGNOSIS — M21.612 BUNION OF LEFT FOOT: Primary | ICD-10-CM

## 2022-02-07 DIAGNOSIS — D23.72 BENIGN NEOPLASM OF SKIN OF LOWER LIMB, INCLUDING HIP, LEFT: ICD-10-CM

## 2022-02-07 DIAGNOSIS — M79.672 PAIN IN LEFT FOOT: ICD-10-CM

## 2022-02-07 PROCEDURE — 17110 DESTRUCTION B9 LES UP TO 14: CPT | Performed by: PODIATRIST

## 2022-02-07 PROCEDURE — 99204 OFFICE O/P NEW MOD 45 MIN: CPT | Performed by: PODIATRIST

## 2022-02-07 NOTE — PROGRESS NOTES
[address]  Dept: [phone]    NEW PATIENT PROGRESS NOTE  Date of patient's visit: 2/7/2022  Patient's Name:  Sayra Vega YOB: 1960            Patient Care Team:  Giulia Vaz as PCP - General (Family Medicine)        Chief Complaint   Patient presents with    New Patient    Other     growth on bottom of left foot x 4 months         HPI:   Sayra Vega is a 59 y.o. male who presents to the office today complaining of growth on bottom of left foot. Symptoms began 4 month(s) ago. Patient relates pain is Present. Pain is rated 8 out of 10 and is described as intermittent. Treatments prior to today's visit include: none. Currently denies F/C/N/V. Pt's primary care physician is Giulia Vaz last seen December 21 2021. Patient states he has a left foot bunion. He states he had a bunionectomy back in [facility] at [facility]. He states the doctor was really old but feels as though the procedure was not done correctly. Allergies   Allergen Reactions    Dye [Iodides]      Rash after cystogram on the groin     Sulfamethoxazole Swelling    Ciprofloxacin Rash       Past Medical History:   Diagnosis Date    Colon polyps     COVID-19 vaccine administered 3-, 2-    [employer]    Diverticulitis     Hyperlipidemia     Dr. Dora Moreno Prostate cancer [facility])     prostate    Sleep apnea     C-PAP AT [facility]    Status post implantation of artificial urinary sphincter 05/20/2021    DR. SEBASTIAN, ACTIVATED ON JULY 1ST. IF PT. EVERY NEEDS TO  BE CATHETERIZED NEEDS DISENGAGEMENT PER. UROLOGIST DR. SEBASTIAN.  Wears prescription eyeglasses     Wellness examination     Dr. Hernandez Record last seen 12/31/2019       Prior to Admission medications    Medication Sig Start Date End Date Taking?  Authorizing Provider   aspirin 81 MG chewable tablet Take 81 mg by mouth daily Stopped temporarily for this procedure.    Yes Historical Provider, MD   sertraline (ZOLOFT) 100 MG tablet  1/11/21  Yes Historical Provider, MD   loratadine (CLARITIN) 10 MG tablet  6/27/20  Yes Historical Provider, MD   zolpidem (AMBIEN) 10 MG tablet  6/27/20  Yes Historical Provider, MD   omeprazole (PRILOSEC) 20 MG delayed release capsule Take 20 mg by mouth Daily   Yes Historical Provider, MD   vitamin D 25 MCG (1000 UT) CAPS Take by mouth   Yes Historical Provider, MD   fenofibrate 160 MG tablet Take 160 mg by mouth daily   Yes Historical Provider, MD   Ascorbic Acid (VITAMIN C) 500 MG tablet Take 500 mg by mouth daily   Yes Historical Provider, MD   psyllium (METAMUCIL) 0.52 G capsule Take 0.52 g by mouth daily    Yes Historical Provider, MD       Past Surgical History:   Procedure Laterality Date    COLONOSCOPY  2012, 2017    COLONOSCOPY N/A 9/17/2021    COLORECTAL CANCER SCREENING, NOT HIGH RISK performed by Masha Betancur MD at [address] [place] [address] N/A     2/7/2020    PROSTATECTOMY  03/04/2020    XI ROBOTIC LAPAROSCOPIC, WITH LYMPH NODE DISSECTION     PROSTATECTOMY N/A 3/4/2020    XI ROBOTIC LAPAROSCOPIC PROSTATECTOMY WITH LYMPH NODE DISSECTION performed by María Elena Hughes MD at [address] [facility] URINARY SPHINCTER PLACED AND IF PT.-(MEHUL)  EVER NEEDS TO BE CATHETERIZED MUST BE DISENGAGED PER UROLOGIST (DR. Gina Romo)       Family History   Problem Relation Age of Onset    Prostate Cancer Brother         bladder and prostate    Colon Cancer Mother         lung    Heart Disease Father     Heart Attack Father     COPD Father         COPD       Social History     Tobacco Use    Smoking status: Never Smoker    Smokeless tobacco: Never Used   Substance Use Topics    Alcohol use: No       Review of Systems    Review of Systems:   History obtained from chart review and the patient  General ROS: negative for - chills, fatigue, fever, night sweats or weight gain  Constitutional: Negative for chills, diaphoresis, fatigue, fever and unexpected weight change. Musculoskeletal: Positive for arthralgias, gait problem and joint swelling. Neurological ROS: negative for - behavioral changes, confusion, headaches or seizures. Negative for weakness and numbness. Dermatological ROS: negative for - mole changes, rash  Cardiovascular: Negative for leg swelling. Gastrointestinal: Negative for constipation, diarrhea, nausea and vomiting. Lower Extremity Physical Examination:   Vitals: There were no vitals filed for this visit. General: AAO x 3 in NAD. Dermatologic Exam:  Skin lesion present to the  left plantar foot with a central core and petchaie noted to the lesions periphery. Pain on palpation of the lesion    Musculoskeletal:     1st MPJ ROM decreased, Bilateral.  Muscle strength 5/5, Bilateral.  Pain present upon palpation of left foot lesion plantar 2nd metatarsal .  Medial longitudinal arch, Bilateral WNL.   Ankle ROM WNL,Bilateral.    Dorsally contracted digits absent digits 1-5 Bilateral.     Vascular: DP and PT pulses palpable 2/4, Bilateral.  CFT <3 seconds, Bilateral.  Hair growth present to the level of the digits, Bilateral.  Edema absent, Bilateral.  Varicosities absent, Bilateral. Erythema absent, Bilateral    Neurological: Sensation intact to light touch to level of digits, Bilateral.  Protective sensation intact 10/10 sites via 5.07/10g [place] Monofilament, Bilateral.  negative Tinel's, Bilateral.  negative Valleix sign, Bilateral.      Integument: Warm, dry, supple, Bilateral.  Open lesion absent, Bilateral.  Interdigital maceration absent to web spaces 1-4, Bilateral.  Nails are normal in length, thickness and color 1-5 bilateral.  Fissures absent, Bilateral.     Radiographs:  3 views left foot  Metal staple to the proximal phalanx with resected medial eminence of the 1st metatarsal.  There is a widened IM angle     Asessment: Patient is a 59 y.o. male with:    Diagnosis Orders   1. Bunion of left foot  XR FOOT LEFT (MIN 3 VIEWS)    90253 - AR DESTRUCTION BENIGN LESIONS UP TO 14   2. Benign neoplasm of skin of lower limb, including hip, left  07520 - AR DESTRUCTION BENIGN LESIONS UP TO 14   3. Pain in left foot  86090 - AR DESTRUCTION BENIGN LESIONS UP TO 14       Plan: Patient examined and evaluated. Current condition and treatment options discussed in detail. Discussed conservative and surgical options with the patient. Advised pt to his condtion  . The lesions were partially excised via 15 blade and silver nitrate was applied under occlusion. The patient tolerated the procedure well and without complication. Advised patient to use vasoline to the area after tomorrow to prevent surrounding tissue irritation. X rays revieweed with patient. Pt has minimal pain to the bunion and would benefit from OTC orthotics. Arch Pain: Exercises  Introduction  Here are some examples of exercises for you to try. The exercises may be suggested for a condition or for rehabilitation. Start each exercise slowly. Ease off the exercises if you start to have pain. You will be told when to start these exercises and which ones will work best for you. How to do the exercises  Plantar fascia stretch    1. Sit in a chair and put your affected foot on your other knee. 2. Hold the heel of your foot in one hand, and grasp your toes with the other hand. 3. Pull on your heel (toward your body), and at the same time pull your toes back with your other hand. 4. You should feel a stretch along the bottom of your foot. 5. Hold 15 to 30 seconds. 6. Repeat 2 to 4 times. Plantar fascia stretch (kneeling)    1. Get on your hands and knees on the floor. Keep your heels pointing up and the balls of your feet and your toes on the floor. 2. Slowly sit back toward your ankles. 3. If this is too hard, you can try doing it one leg at a time.  Stand up, and then kneel on one knee and keep the other leg forward. Place the foot of your forward leg flat on the ground and bend that knee. The heel on the leg still behind you should point up. The ball and toes of that foot should be on the floor. Sit back toward that ankle. 4. Hold 15 to 30 seconds. 5. Repeat 2 to 4 times. Switch legs if you are doing this one leg at a time. Plantar fascia self-massage    1. Sit in a chair. 2. Place your affected foot on a firm, tube-shaped object, such as a can or water bottle. 3. Roll your foot back and forth over the object to massage the bottom of your foot. 4. If you want to do ice massage, fill a water bottle about three-fourths of the way full and freeze before using. 5. Continue for 2 to 5 minutes. Bilateral calf stretch (knees straight)    1. Place a book on the floor a few inches from a wall or countertop, and put the balls of your feet on it. Your heels should be on the floor. The book needs to be thick enough so that you can feel a gentle stretch in each calf. If you are not steady on your feet, hold on to a chair, counter, or wall while you do this stretch. 2. Keep your knees straight, and lean forward until you feel a stretch in each calf. 3. To get more stretch, add another book or use a thicker book, such as a phone book, a dictionary, or an encyclopedia. 4. Hold the stretch for at least 15 to 30 seconds. 5. Repeat 2 to 4 times. Bilateral calf stretch (knees bent)    1. Place a book on the floor a few inches from a wall or countertop, and put the balls of your feet on it. Your heels should be on the floor. The book needs to be thick enough so that you can feel a gentle stretch in each calf. If you are not steady on your feet, hold on to a chair, counter, or wall while you do this stretch. 2. Bend your knees, and lean forward until you feel a stretch in each calf.   3. To get more stretch, add another book or use a thicker book, such as a phone book, a dictionary, or an encyclopedia. 4. Hold the stretch for at least 15 to 30 seconds. 5. Repeat 2 to 4 times. [place] pick-ups    1. Put some marbles on the floor next to a cup.  2. Sit down, and use the toes of your affected foot to lift up one marble from the floor at a time. Then try to put the marble in the cup.  3. Repeat 8 to 12 times. Towel scrunches    1. Sit down, and place your affected foot on a towel on the floor. You may also do this with both feet on the towel. 2. Scrunch the towel toward you with your toes. Then use your toes to push the towel back into place. 3. Repeat 8 to 12 times. Heel raises on a step    1. Stand on the bottom step of a staircase, facing up toward the stairs. Put the balls of your feet on the step. If you are not steady on your feet, hold on to the banister or wall. 2. Keeping both knees straight, slowly lift your heels above the step so that you are standing on your toes. Then slowly lower your heels below the step and toward the floor. 3. Return to the starting position, with your feet even with the step. 4. Repeat 8 to 12 times. Follow-up care is a key part of your treatment and safety. Be sure to make and go to all appointments, and call your doctor if you are having problems. It's also a good idea to know your test results and keep a list of the medicines you take. Where can you learn more? Go to https://[employer]. org and sign in to your [employer] account. Enter H119 in the [facility] box to learn more about \"Arch Pain: Exercises. \"     If you do not have an account, please click on the \"Sign Up Now\" link. Current as of: September 20, 2018  Content Version: 12.1  © 5082-4322 [employer], [employer]. Care instructions adapted under license by [facility] ([facility]).  If you have questions about a medical condition or this instruction, always ask your healthcare professional. [employer] disclaims any warranty or liability for your use of this information. All labs were reviewed and all imagining including the above findings were reviewed PRIOR to the patients arrival and with the patient today. Previous patient encounter was reviewed. Encounters from the patients other medical providers were reviewed and noted. Time was spent educating the patient and their families/caregivers on proper care of the feet and ankles. All the above diagnosis were addressed at todays visit and all questions were answered. A total of 45 minutes was spent with this patients encounter which included charting after the patients visit         . Verbal and written instructions given to patient. Contact office with any questions/problems/concerns. RTC in 6week(s).     2/7/2022    Electronically signed by Deonna Landis DPM on 2/7/2022 at 10:40 AM  2/7/2022

## 2022-03-07 ENCOUNTER — OFFICE VISIT (OUTPATIENT)
Dept: PODIATRY | Age: 62
End: 2022-03-07
Payer: COMMERCIAL

## 2022-03-07 VITALS — WEIGHT: 185 LBS | BODY MASS INDEX: 28.04 KG/M2 | HEIGHT: 68 IN

## 2022-03-07 DIAGNOSIS — M79.672 PAIN IN LEFT FOOT: ICD-10-CM

## 2022-03-07 DIAGNOSIS — M21.612 BUNION OF LEFT FOOT: Primary | ICD-10-CM

## 2022-03-07 DIAGNOSIS — D23.72 BENIGN NEOPLASM OF SKIN OF LOWER LIMB, INCLUDING HIP, LEFT: ICD-10-CM

## 2022-03-07 PROCEDURE — 99999 PR OFFICE/OUTPT VISIT,PROCEDURE ONLY: CPT | Performed by: PODIATRIST

## 2022-03-07 PROCEDURE — 17110 DESTRUCTION B9 LES UP TO 14: CPT | Performed by: PODIATRIST

## 2022-03-07 NOTE — PROGRESS NOTES
[address]  Dept: [phone]    RETURN PATIENT PROGRESS NOTE  Date of patient's visit: 3/7/2022  Patient's Name:  Serge Zhou YOB: 1960            Patient Care Team:  Marychuy Horn as PCP - General (Family Medicine)       Mauro Celina Generous 59 y.o. male that presents for follow-up of   Chief Complaint   Patient presents with    Foot Pain    Bunions    Other     growth on bottom of left foot     Pt's primary care physician is Marychuy Horn last seen December 21 2021  Symptoms began 4 month(s) ago and are decreased . Patient relates pain is present. Pain is rated 2 out of 10 and is described as none. Treatments prior to today's visit include: previous podiatry treatment previous acid treatment  Currently denies F/C/N/V. Allergies   Allergen Reactions    Dye [Iodides]      Rash after cystogram on the groin     Sulfamethoxazole Swelling    Ciprofloxacin Rash       Past Medical History:   Diagnosis Date    Colon polyps     COVID-19 vaccine administered 3-, 2-    [facility]    Diverticulitis     Hyperlipidemia     Dr. Olga Ruiz Prostate cancer [facility])     prostate    Sleep apnea     C-PAP AT HS    Status post implantation of artificial urinary sphincter 05/20/2021    DR. SEBASTIAN, ACTIVATED ON JULY 1ST. IF PT. EVERY NEEDS TO  BE CATHETERIZED NEEDS DISENGAGEMENT PER. UROLOGIST DR. SEBASTIAN.  Wears prescription eyeglasses     Wellness examination     Dr. Delgado Corona last seen 12/31/2019       Prior to Admission medications    Medication Sig Start Date End Date Taking? Authorizing Provider   aspirin 81 MG chewable tablet Take 81 mg by mouth daily Stopped temporarily for this procedure.    Yes Historical Provider, MD   sertraline (ZOLOFT) 100 MG tablet  1/11/21  Yes Historical Provider, MD   loratadine (CLARITIN) 10 MG tablet  6/27/20  Yes Historical Provider, MD zolpidem (AMBIEN) 10 MG tablet  6/27/20  Yes Historical Provider, MD   omeprazole (PRILOSEC) 20 MG delayed release capsule Take 20 mg by mouth Daily   Yes Historical Provider, MD   vitamin D 25 MCG (1000 UT) CAPS Take by mouth   Yes Historical Provider, MD   fenofibrate 160 MG tablet Take 160 mg by mouth daily   Yes Historical Provider, MD   psyllium (METAMUCIL) 0.52 G capsule Take 0.52 g by mouth daily    Yes Historical Provider, MD   Ascorbic Acid (VITAMIN C) 500 MG tablet Take 500 mg by mouth daily  Patient not taking: Reported on 3/7/2022    Historical Provider, MD       Review of Systems    Review of Systems:  History obtained from chart review and the patient  General ROS: negative for - chills, fatigue, fever, night sweats or weight gain  Constitutional: Negative for chills, diaphoresis, fatigue, fever and unexpected weight change. Musculoskeletal: Positive for arthralgias, gait problem and joint swelling. Neurological ROS: negative for - behavioral changes, confusion, headaches or seizures. Negative for weakness and numbness. Dermatological ROS: negative for - mole changes, rash  Cardiovascular: Negative for leg swelling. Gastrointestinal: Negative for constipation, diarrhea, nausea and vomiting. Lower Extremity Physical Examination:     Vitals: There were no vitals filed for this visit. General: AAO x 3 in NAD. Dermatologic Exam:  Skin lesion present to the  left plantar foot with a central core and petchaie noted to the lesions periphery. Pain on palpation of the lesion    Musculoskeletal:     1st MPJ ROM decreased, Bilateral.  Muscle strength 5/5, Bilateral.  Pain present upon palpation of left foot lesion . Medial longitudinal arch, Bilateral WNL.   Ankle ROM WNL,Bilateral.    Dorsally contracted digits absent digits 1-5 Bilateral.     Vascular: DP and PT pulses palpable 2/4, Bilateral.  CFT <3 seconds, Bilateral.  Hair growth present to the level of the digits, Bilateral.  Edema absent, Bilateral.  Varicosities absent, Bilateral. Erythema absent, Bilateral    Neurological: Sensation intact to light touch to level of digits, Bilateral.  Protective sensation intact 10/10 sites via 5.07/10g Kiana-Angeline Monofilament, Bilateral.  negative Tinel's, Bilateral.  negative Valleix sign, Bilateral.      Integument: Warm, dry, supple, Bilateral.  Open lesion absent, Bilateral.  Interdigital maceration absent to web spaces 1-4, Bilateral.  Nails are normal in length, thickness and color 1-5 bilateral.  Fissures absent, Bilateral.       Asessment: Patient is a 59 y.o. male with:    Diagnosis Orders   1. Bunion of left foot  14555 - PA DESTRUCTION BENIGN LESIONS UP TO 14   2. Benign neoplasm of skin of lower limb, including hip, left  06631 - PA DESTRUCTION BENIGN LESIONS UP TO 14   3. Pain in left foot  60805 - PA DESTRUCTION BENIGN LESIONS UP TO 14         Plan: Patient examined and evaluated. Current condition and treatment options discussed in detail. Advised pt to his condition. The bunion has no pain. The lesions were partially excised via 15 blade and silver nitrate was applied under occlusion. The patient tolerated the procedure well and without complication. Advised patient to use vasoline to the area after tomorrow to prevent surrounding tissue irritation. .  Verbal and written instructions given to patient. Contact office with any questions/problems/concerns. No orders of the defined types were placed in this encounter. No orders of the defined types were placed in this encounter.        RTC in PRN.    3/7/2022      Electronically signed by Patt Liu DPM on 3/7/2022 at 9:44 AM  3/7/2022

## 2022-03-09 LAB — COMMENT: NORMAL

## 2022-03-10 LAB — PSA, ULTRASENSITIVE: <0.01 NG/ML (ref 0–4)

## 2022-03-24 ENCOUNTER — OFFICE VISIT (OUTPATIENT)
Dept: UROLOGY | Age: 62
End: 2022-03-24
Payer: COMMERCIAL

## 2022-03-24 VITALS
HEIGHT: 68 IN | TEMPERATURE: 96.8 F | BODY MASS INDEX: 28.04 KG/M2 | SYSTOLIC BLOOD PRESSURE: 138 MMHG | WEIGHT: 185 LBS | DIASTOLIC BLOOD PRESSURE: 82 MMHG | OXYGEN SATURATION: 97 % | HEART RATE: 80 BPM

## 2022-03-24 DIAGNOSIS — R11.2 NAUSEA AND VOMITING, INTRACTABILITY OF VOMITING NOT SPECIFIED, UNSPECIFIED VOMITING TYPE: ICD-10-CM

## 2022-03-24 DIAGNOSIS — R10.9 RIGHT FLANK PAIN: Primary | ICD-10-CM

## 2022-03-24 DIAGNOSIS — N39.46 MIXED INCONTINENCE URGE AND STRESS: ICD-10-CM

## 2022-03-24 DIAGNOSIS — C61 PROSTATE CANCER (HCC): ICD-10-CM

## 2022-03-24 DIAGNOSIS — Z90.79 S/P PROSTATECTOMY: ICD-10-CM

## 2022-03-24 PROCEDURE — 99214 OFFICE O/P EST MOD 30 MIN: CPT | Performed by: UROLOGY

## 2022-03-24 ASSESSMENT — ENCOUNTER SYMPTOMS
EYE REDNESS: 0
DIARRHEA: 0
COUGH: 0
BACK PAIN: 0
SHORTNESS OF BREATH: 0
ABDOMINAL PAIN: 0
VOMITING: 0
EYE PAIN: 0
WHEEZING: 0
NAUSEA: 0
CONSTIPATION: 0

## 2022-03-24 NOTE — PROGRESS NOTES
Review of Systems   Constitutional: Negative for chills, fatigue and fever. Eyes: Negative for pain, redness and visual disturbance. Respiratory: Negative for cough, shortness of breath and wheezing. Cardiovascular: Negative for chest pain and leg swelling. Gastrointestinal: Negative for abdominal pain, constipation, diarrhea, nausea and vomiting. Genitourinary: Positive for urgency. Negative for difficulty urinating, dysuria, flank pain, frequency, hematuria, scrotal swelling and testicular pain. Musculoskeletal: Negative for back pain, joint swelling and myalgias. Skin: Negative for rash and wound. Neurological: Negative for dizziness, tremors and numbness. Hematological: Does not bruise/bleed easily.

## 2022-03-24 NOTE — PROGRESS NOTES
[address] [facility] [address]  Dept:  Madhuri [facility] Urology Office Note - Established    Patient:  Laxmi Guillory  YOB: 1960  Date: 3/24/2022    The patient is a 59 y.o. male who presents todayfor evaluation of the following problems:   Chief Complaint   Patient presents with    6 Month Follow-Up     spasms in LLQ radiating to Back L side, with vomiting- started Friday night until 4 am Saturday.  Results     PSA       HPI  Pt presenting for f/u prostate cancer. Had rrp 3/4/2020, kee 7. PSA is <0.01. Voiding well with AUS. He has minimal stress incontinence, wears underwear with a pad but rarely gets wet. He is satisfied with current symptoms. No dysuria or hematuria. Last week he experienced pain to right groin that radiated to right flank. No hematuria or dysuria, No fever or chills. Had n/v with it. This episode occurred after strenuous activity. He did have a CT with contrast in August for similar episode which was negative. Summary of old records: N/A    Additional History: N/A    Procedures Today: N/A    Urinalysis today:  No results found for this visit on 03/24/22. Last several PSA's:  No results found for: PSA  Last total testosterone:  No results found for: TESTOSTERONE    AUA Symptom Score (3/24/2022):   INCOMPLETE EMPTYING: How often have you had the sensation of not emptying your bladder?: Not at all  FREQUENCY: How often do you have to urinate less than every two hours?: Almost always  INTERMITTENCY: How often have you found you stopped and started again several times when you urinated?: Less than 1 to 5 times  URGENCY: How often have you found it difficult to postpone urination?: Not at all  WEAK STREAM: How often have you had a weak urinary stream?: Not at all  STRAINING: How often have you had to strain to start  urination?: Not at all  NOCTURIA: How many times did you typically get up at night to uriniate?: 2 Times  TOTAL I-PSS SCORE[de-identified] 8  How would you feel if you were to spend the rest of your life with your urinary condition?: Pleased    Last BUN and creatinine:  Lab Results   Component Value Date    BUN 11 03/05/2020     Lab Results   Component Value Date    CREATININE 0.85 03/05/2020       Additional Lab/Culture results: none    Imaging Reviewed during this Office Visit:   CT pelvis with contrast    Anatomical Region Laterality Modality   Body -- Computed Tomography   Pelvis -- --   Hip -- --   Acetabulum -- --   Body Covera -- --       Narrative  Performed by [facility]  Clinical indication     Right lower quadrant pain for the past month history of urinary sphincter implant. CT the pelvis only performed during the IV administration of 120 mL of Omnipaque 300.  Some oral contrast was also administered     FINDINGS: Orally administered contrast is seen in the distal small bowel as well as in the cecum and ascending colon.  The terminal ileum and the ileocecal valve appear to be unremarkable. Italia Callander is a normal appendix. A fluid-filled reservoir for a urinary sphincter implant device is seen in position in the right lower pelvis with a catheter extending from the reservoir to the sphincter components near the base of the bladder. Italia Callander is no evidence of a fluid collection or abscess. Italia Callander is no free fluid in the   pelvis.  There is no lymphadenopathy     There is no acute skeletal pathology. IMPRESSION: No acute pelvic pathology.  Normal appendix.  Postoperative findings related to a urinary sphincter implant, please see above for details. All CT scans at this facility use dose modulation, iterative reconstruction, and/or weight based dosing when appropriate to reduce radiation dose to as low as reasonably achievable.      Workstation:[id number]     Finalized by Shireen Rowley MD on 8/25/2021 9:55 AM    (results were independently reviewed by physician and radiology report verified)    PAST MEDICAL, FAMILY AND SOCIAL HISTORY UPDATE:  Past Medical History:   Diagnosis Date    Colon polyps     COVID-19 vaccine administered 3-, 2-    [facility]    Diverticulitis     Hyperlipidemia     Dr. Piyush Zimmerman Prostate cancer [facility])     prostate    Sleep apnea     C-PAP AT HS    Status post implantation of artificial urinary sphincter 05/20/2021    DR. SEBASTIAN, ACTIVATED ON JULY 1ST. IF PT. EVERY NEEDS TO  BE CATHETERIZED NEEDS DISENGAGEMENT PER. UROLOGIST DR. SEBASTIAN.  Wears prescription eyeglasses     Wellness examination     Dr. Luz Matt last seen 12/31/2019     Past Surgical History:   Procedure Laterality Date    COLONOSCOPY  2012, 2017    COLONOSCOPY N/A 9/17/2021    COLORECTAL CANCER SCREENING, NOT HIGH RISK performed by Juan Manuel Barfield MD at [address] [place] [address] N/A     2/7/2020    PROSTATECTOMY  03/04/2020    XI ROBOTIC LAPAROSCOPIC, WITH LYMPH NODE DISSECTION     PROSTATECTOMY N/A 3/4/2020    XI ROBOTIC LAPAROSCOPIC PROSTATECTOMY WITH LYMPH NODE DISSECTION performed by Kierra Spencer MD at [address] [place] [address]      ARTIFICIAL URINARY SPHINCTER PLACED AND IF PT.-(MEHUL)  EVER NEEDS TO BE CATHETERIZED MUST BE DISENGAGED PER UROLOGIST (DR. Iesha Garcia)     Family History   Problem Relation Age of Onset    Prostate Cancer Brother         bladder and prostate    Colon Cancer Mother         lung    Heart Disease Father     Heart Attack Father     COPD Father         COPD     Outpatient Medications Marked as Taking for the 3/24/22 encounter (Office Visit) with Kierra Spencer MD   Medication Sig Dispense Refill    aspirin 81 MG chewable tablet Take 81 mg by mouth daily Stopped temporarily for this procedure.       sertraline (ZOLOFT) 100 MG tablet       loratadine (CLARITIN) 10 MG tablet       zolpidem (AMBIEN) 10 MG tablet       omeprazole (PRILOSEC) 20 MG delayed release capsule Take 20 mg by mouth Daily      vitamin D 25 MCG (1000 UT) CAPS Take by mouth      fenofibrate 160 MG tablet Take 160 mg by mouth daily      Ascorbic Acid (VITAMIN C) 500 MG tablet Take 500 mg by mouth daily       psyllium (METAMUCIL) 0.52 G capsule Take 0.52 g by mouth daily          Dye [iodides], Sulfamethoxazole, and Ciprofloxacin  Social History     Tobacco Use   Smoking Status Never Smoker   Smokeless Tobacco Never Used     (Ifpatient a smoker, smoking cessation counseling offered)    Social History     Substance and Sexual Activity   Alcohol Use No       REVIEW OF SYSTEMS:  Review of Systems    Physical Exam:      Vitals:    03/24/22 0816   BP: 138/82   Pulse: 80   Temp: 96.8 °F (36 °C)   SpO2: 97%     Body mass index is 28.13 kg/m². Patient is a 59 y.o. male in no acute distress and alert and oriented to person, place and time. Physical Exam  Constitutional: Patient in no acute distress. Neuro: Alert and oriented to person, place and time. Psych: Mood normal, affect normal  Skin: No rash noted  Lungs: Respiratory effort is normal  Cardiovascular: Warm & Pink  Abdomen: Soft, non-tender, non-distended with no CVA,  No flank tenderness,  Or hepatosplenomegaly   Lymphatics: No palpablelymphadenopathy. Bladder non-tender and not distended. Musculoskeletal: Normal gait and station      Assessment and Plan      1. Right flank pain    2. Nausea and vomiting, intractability of vomiting not specified, unspecified vomiting type    3. Prostate cancer ([facility] [address]    4. Mixed incontinence urge and stress    5. S/P prostatectomy           Plan:     hx prostate ca, s/p rrp March 2020. PSA remains undetectable. Will repeat 1 year. AUS working well, notify us of any concerns. Had episode of right groin and flank pain with vomiting, ct with contrast last year neg. Will repeat. Call with any new or worsening urinary symptoms. Return in about 1 year (around 3/24/2023) for PSA.     Prescriptions Ordered:  No orders of

## 2022-03-29 ENCOUNTER — HOSPITAL ENCOUNTER (OUTPATIENT)
Dept: CT IMAGING | Age: 62
Discharge: HOME | End: 2022-03-31
Payer: COMMERCIAL

## 2022-03-29 ENCOUNTER — TELEPHONE (OUTPATIENT)
Dept: UROLOGY | Age: 62
End: 2022-03-29

## 2022-03-29 DIAGNOSIS — R11.2 NAUSEA AND VOMITING, INTRACTABILITY OF VOMITING NOT SPECIFIED, UNSPECIFIED VOMITING TYPE: ICD-10-CM

## 2022-03-29 DIAGNOSIS — R10.9 RIGHT FLANK PAIN: ICD-10-CM

## 2022-03-29 DIAGNOSIS — N20.1 RIGHT URETERAL STONE: Primary | ICD-10-CM

## 2022-03-29 PROCEDURE — 74176 CT ABD & PELVIS W/O CONTRAST: CPT

## 2022-03-30 RX ORDER — ALFUZOSIN HYDROCHLORIDE 10 MG/1
10 TABLET, EXTENDED RELEASE ORAL DAILY
Qty: 30 TABLET | Refills: 0 | Status: SHIPPED | OUTPATIENT
Start: 2022-03-30 | End: 2022-10-27

## 2022-03-31 RX ORDER — TRAMADOL HYDROCHLORIDE 50 MG/1
50 TABLET ORAL EVERY 8 HOURS PRN
Qty: 21 TABLET | Refills: 0 | Status: SHIPPED | OUTPATIENT
Start: 2022-03-31 | End: 2022-04-07

## 2022-03-31 NOTE — TELEPHONE ENCOUNTER
Patient was notified. He then stated Conor Lincoln send over a pain med. When the pain kicks in it's bad. \"    Writer let patient know that Leticia Valdes is seeing patients. Will call back with an update later.     Verbalized understanding

## 2022-03-31 NOTE — TELEPHONE ENCOUNTER
Pain is intermittent several times per day. Pain at its worst is 9-10/10. He has tried tylenol for pain and ibuprofen 800mg which has not helped. Will send tramadol, understands he may not drive while taking. May alter with ibuprofen or tylenol PRN. Continue straining urine.    Keep f/u as scheduled

## 2022-04-27 ENCOUNTER — TELEPHONE (OUTPATIENT)
Dept: UROLOGY | Age: 62
End: 2022-04-27

## 2022-04-27 NOTE — TELEPHONE ENCOUNTER
Mirela Sandoval called into the office, has an appt on 5/5/2022 and wanted to know if he needed another CT scan.  Adv MA would f/u and call back

## 2022-04-28 NOTE — TELEPHONE ENCOUNTER
Spoke with patient. He continues to have intermittent flank and groin pain, last episode was 5-7 days ago. Advised to continue moving forward with repeat imaging. Number for central scheduling provided.  Patient verbalized understanding and call ended

## 2022-05-03 ENCOUNTER — HOSPITAL ENCOUNTER (OUTPATIENT)
Dept: CT IMAGING | Age: 62
Discharge: HOME | End: 2022-05-05
Payer: COMMERCIAL

## 2022-05-03 DIAGNOSIS — N20.1 RIGHT URETERAL STONE: ICD-10-CM

## 2022-05-03 PROCEDURE — 74176 CT ABD & PELVIS W/O CONTRAST: CPT

## 2022-05-05 ENCOUNTER — OFFICE VISIT (OUTPATIENT)
Dept: UROLOGY | Age: 62
End: 2022-05-05
Payer: COMMERCIAL

## 2022-05-05 VITALS
TEMPERATURE: 96.8 F | HEIGHT: 68 IN | BODY MASS INDEX: 28.04 KG/M2 | WEIGHT: 185 LBS | DIASTOLIC BLOOD PRESSURE: 86 MMHG | HEART RATE: 70 BPM | SYSTOLIC BLOOD PRESSURE: 129 MMHG

## 2022-05-05 DIAGNOSIS — N20.0 KIDNEY STONE: Primary | ICD-10-CM

## 2022-05-05 DIAGNOSIS — C61 PROSTATE CANCER (HCC): ICD-10-CM

## 2022-05-05 PROCEDURE — 99214 OFFICE O/P EST MOD 30 MIN: CPT | Performed by: UROLOGY

## 2022-05-05 RX ORDER — ALFUZOSIN HYDROCHLORIDE 10 MG/1
10 TABLET, EXTENDED RELEASE ORAL DAILY
Qty: 30 TABLET | Refills: 0 | Status: SHIPPED | OUTPATIENT
Start: 2022-05-05 | End: 2022-06-06

## 2022-05-05 ASSESSMENT — ENCOUNTER SYMPTOMS
CONSTIPATION: 0
DIARRHEA: 0
SHORTNESS OF BREATH: 0
WHEEZING: 0
ABDOMINAL PAIN: 0
GASTROINTESTINAL NEGATIVE: 1
EYE PAIN: 0
COUGH: 0
RESPIRATORY NEGATIVE: 1
EYE REDNESS: 0
NAUSEA: 0
BACK PAIN: 0
VOMITING: 0
EYES NEGATIVE: 1

## 2022-05-05 NOTE — PROGRESS NOTES
[address] [address]  Dept: 92 [facility] Urology Office Note - Established    Patient:  Ghassan Payne  YOB: 1960  Date: 5/5/2022    The patient is a 59 y.o. male who presents todayfor evaluation of the following problems:   Chief Complaint   Patient presents with    Nephrolithiasis     ct stone       HPI  Here for f/u kidney stone. Had c/o right flank pain in March, CT showed 4mm at R UVJ at that time. Repeated CT and still shows stone at R UVJ. Pain remains intermittent. Urinary frequency and urgency. No hematuria or dysuria. No fever. This is a new issue    Summary of old records: N/A    Additional History: N/A    Procedures Today: N/A    Urinalysis today:  No results found for this visit on 05/05/22. Last several PSA's:  No results found for: PSA  Last total testosterone:  No results found for: TESTOSTERONE    AUA Symptom Score (5/5/2022): Last BUN and creatinine:  Lab Results   Component Value Date    BUN 11 03/05/2020     Lab Results   Component Value Date    CREATININE 0.85 03/05/2020       Additional Lab/Culture results: none    Imaging Reviewed during this Office Visit: none  (results were independently reviewed by physician and radiology report verified)    PAST MEDICAL, FAMILY AND SOCIAL HISTORY UPDATE:  Past Medical History:   Diagnosis Date    Colon polyps     COVID-19 vaccine administered 3-, 2-    [employer]    Diverticulitis     Hyperlipidemia     Dr. Rowan Fitzgerald Prostate cancer [facility])     prostate    Sleep apnea     C-PAP AT HS    Status post implantation of artificial urinary sphincter 05/20/2021    DR. SEBASTIAN, ACTIVATED ON JULY 1ST. IF PT. EVERY NEEDS TO  BE CATHETERIZED NEEDS DISENGAGEMENT PER. UROLOGIST DR. SEBASTIAN.     Wears prescription eyeglasses     Wellness examination     Dr. Cassie Lord last seen 12/31/2019 Past Surgical History:   Procedure Laterality Date    COLONOSCOPY  2012, 2017    COLONOSCOPY N/A 9/17/2021    COLORECTAL CANCER SCREENING, NOT HIGH RISK performed by Marilyn Lama MD [address]     2/7/2020    PROSTATECTOMY  03/04/2020    XI ROBOTIC LAPAROSCOPIC, WITH LYMPH NODE DISSECTION     PROSTATECTOMY N/A 3/4/2020    XI ROBOTIC LAPAROSCOPIC PROSTATECTOMY WITH LYMPH NODE DISSECTION performed by Elicia Cloud MD at [address]      ARTIFICIAL URINARY SPHINCTER PLACED AND IF PT.-(MEHUL)  EVER NEEDS TO BE CATHETERIZED MUST BE DISENGAGED PER UROLOGIST (DR. Helen Martel)     Family History   Problem Relation Age of Onset    Prostate Cancer Brother         bladder and prostate    Colon Cancer Mother         lung    Heart Disease Father     Heart Attack Father     COPD Father         COPD     Outpatient Medications Marked as Taking for the 5/5/22 encounter (Office Visit) with Elicia Cloud MD   Medication Sig Dispense Refill    alfuzosin (UROXATRAL) 10 MG extended release tablet Take 1 tablet by mouth daily 30 tablet 0    alfuzosin (UROXATRAL) 10 MG extended release tablet Take 1 tablet by mouth daily 30 tablet 0    aspirin 81 MG chewable tablet Take 81 mg by mouth daily Stopped temporarily for this procedure.       sertraline (ZOLOFT) 100 MG tablet       loratadine (CLARITIN) 10 MG tablet       zolpidem (AMBIEN) 10 MG tablet       omeprazole (PRILOSEC) 20 MG delayed release capsule Take 20 mg by mouth Daily      vitamin D 25 MCG (1000 UT) CAPS Take by mouth      fenofibrate 160 MG tablet Take 160 mg by mouth daily      Ascorbic Acid (VITAMIN C) 500 MG tablet Take 500 mg by mouth daily       psyllium (METAMUCIL) 0.52 G capsule Take 0.52 g by mouth daily          Dye [iodides], Sulfamethoxazole, and Ciprofloxacin  Social History     Tobacco Use   Smoking Status Never Smoker   Smokeless Tobacco Never Used     (Ifpatient a smoker, smoking cessation counseling offered)    Social History     Substance and Sexual Activity   Alcohol Use No       REVIEW OF SYSTEMS:  Review of Systems    Physical Exam:      Vitals:    05/05/22 0842   BP: 129/86   Pulse: 70   Temp: 96.8 °F (36 °C)     Body mass index is 28.13 kg/m². Patient is a 59 y.o. male in no acute distress and alert and oriented to person, place and time. Physical Exam  Constitutional: Patient in no acute distress. Neuro: Alert and oriented to person, place and time. Psych: Mood normal, affect normal  Skin: No rash noted  HEENT: Head: Normocephalic andatraumatic  Conjunctivae and EOM are normal. Pupils are equal, round  Nose:Normal  Right External Ear: Normal; Left External Ear: Normal  Mouth: Mucosa Moist  Neck: Supple  Lungs: Respiratory effort is normal  Cardiovascular: Warm & Pink  Abdomen: Soft, non-tender, non-distended with no CVA,  No flank tenderness,  Or hepatosplenomegaly   Lymphatics: No palpablelymphadenopathy. Assessment and Plan      1. Kidney stone    2. Prostate cancer [facility])           Plan:     kub and if there or still pain then urs laser,   Careful he has AUS  Return in about 2 weeks (around 5/19/2022) for Follow up. Prescriptions Ordered:  Orders Placed This Encounter   Medications    alfuzosin (UROXATRAL) 10 MG extended release tablet     Sig: Take 1 tablet by mouth daily     Dispense:  30 tablet     Refill:  0     Orders Placed:  Orders Placed This Encounter   Procedures    XR ABDOMEN (KUB) (SINGLE AP VIEW)     Standing Status:   Future     Standing Expiration Date:   5/5/2023           Marielos Saunders MD    Agree with the ROS entered by the MA.

## 2022-05-19 ENCOUNTER — HOSPITAL ENCOUNTER (OUTPATIENT)
Dept: GENERAL RADIOLOGY | Age: 62
Discharge: HOME | End: 2022-05-21
Payer: COMMERCIAL

## 2022-05-19 ENCOUNTER — HOSPITAL ENCOUNTER (OUTPATIENT)
Age: 62
Discharge: HOME | End: 2022-05-21
Payer: COMMERCIAL

## 2022-05-19 DIAGNOSIS — N20.0 KIDNEY STONE: ICD-10-CM

## 2022-05-19 PROCEDURE — 74018 RADEX ABDOMEN 1 VIEW: CPT

## 2022-05-26 ENCOUNTER — TELEPHONE (OUTPATIENT)
Dept: UROLOGY | Age: 62
End: 2022-05-26

## 2022-05-26 ENCOUNTER — OFFICE VISIT (OUTPATIENT)
Dept: UROLOGY | Age: 62
End: 2022-05-26
Payer: COMMERCIAL

## 2022-05-26 VITALS
RESPIRATION RATE: 18 BRPM | HEART RATE: 77 BPM | WEIGHT: 185 LBS | HEIGHT: 68 IN | BODY MASS INDEX: 28.04 KG/M2 | DIASTOLIC BLOOD PRESSURE: 75 MMHG | TEMPERATURE: 96.8 F | SYSTOLIC BLOOD PRESSURE: 123 MMHG

## 2022-05-26 DIAGNOSIS — N20.1 URETERAL STONE: ICD-10-CM

## 2022-05-26 DIAGNOSIS — N39.3 STRESS INCONTINENCE OF URINE: ICD-10-CM

## 2022-05-26 DIAGNOSIS — C61 PROSTATE CANCER (HCC): Primary | ICD-10-CM

## 2022-05-26 PROCEDURE — 99214 OFFICE O/P EST MOD 30 MIN: CPT | Performed by: UROLOGY

## 2022-05-26 ASSESSMENT — ENCOUNTER SYMPTOMS
CONSTIPATION: 0
VOMITING: 0
SHORTNESS OF BREATH: 0
COUGH: 0
EYE PAIN: 0
ABDOMINAL PAIN: 0
NAUSEA: 0
WHEEZING: 0
DIARRHEA: 0
BACK PAIN: 0

## 2022-05-26 NOTE — TELEPHONE ENCOUNTER
Per Dr. Ashlyn Guerrero - pt to have Cysto, Right URS, HLL & Right Stent Placemernt    ---------------------  STV 6/14/2022 @ 2:30pm (12:30pm arrival)  PAT Visit: 6/6/2022 @ 9:30am    NPO: Midnight    Pt does NOT need to stop blood thinners    PO: Office will call      **Small scope requested due to pt having AUS**    All surgery information given to patient in office. Patient verbalized an understanding and all questions were answered.

## 2022-05-26 NOTE — PROGRESS NOTES
[address] [address]  Dept: [address] [facility] Urology Office Note - Established    Patient:  Kelley Vu  YOB: 1960  Date: 5/26/2022    The patient is a 59 y.o. male who presents todayfor evaluation of the following problems:   Chief Complaint   Patient presents with    Follow-up     2 week W/ Kub       HPI  Here for prostate cancer. Incontinence and ureteral stone and flank pain. He hasn't passed the stone yet. kub reviewed    Summary of old records: N/A    Additional History: N/A    Procedures Today: N/A    Urinalysis today:  No results found for this visit on 05/26/22. Last several PSA's:  No results found for: PSA  Last total testosterone:  No results found for: TESTOSTERONE    AUA Symptom Score (5/26/2022):   INCOMPLETE EMPTYING: How often have you had the sensation of not emptying your bladder?: Not at all  FREQUENCY: How often do you have to urinate less than every two hours?: Not at all  INTERMITTENCY: How often have you found you stopped and started again several times when you urinated?: Not at all  URGENCY: How often have you found it difficult to postpone urination?: Not at all  WEAK STREAM: How often have you had a weak urinary stream?: Not at all  STRAINING: How often have you had to strain to start  urination?: Not at all  NOCTURIA: How many times did you typically get up at night to uriniate?: NONE  TOTAL I-PSS SCORE[de-identified] 0       Last BUN and creatinine:  Lab Results   Component Value Date    BUN 11 03/05/2020     Lab Results   Component Value Date    CREATININE 0.85 03/05/2020       Additional Lab/Culture results: none    Imaging Reviewed during this Office Visit: none  (results were independently reviewed by physician and radiology report verified)    PAST MEDICAL, FAMILY AND SOCIAL HISTORY UPDATE:  Past Medical History:   Diagnosis Date    Colon polyps     COVID-19 vaccine administered 3-, 2-    [employer]    Diverticulitis     Hyperlipidemia     Dr. John Farnsworth [facility])     prostate    Sleep apnea     C-PAP AT [facility]    Status post implantation of artificial urinary sphincter 05/20/2021    DR. SEBASTIAN, ACTIVATED ON JULY 1ST. IF PT. EVERY NEEDS TO  BE CATHETERIZED NEEDS DISENGAGEMENT PER. UROLOGIST DR. SEBASTIAN.  Wears prescription eyeglasses     Wellness examination     Dr. Lisandra Gilmore last seen 12/31/2019     Past Surgical History:   Procedure Laterality Date    COLONOSCOPY  2012, 2017    COLONOSCOPY N/A 9/17/2021    COLORECTAL CANCER SCREENING, NOT HIGH RISK performed by Vinnie Em MD at [address] [place] [address] N/A     2/7/2020    PROSTATECTOMY  03/04/2020    XI ROBOTIC LAPAROSCOPIC, WITH LYMPH NODE DISSECTION     PROSTATECTOMY N/A 3/4/2020    XI ROBOTIC LAPAROSCOPIC PROSTATECTOMY WITH LYMPH NODE DISSECTION performed by Marielos Saunders MD at [address]      ARTIFICIAL URINARY SPHINCTER PLACED AND IF PT.-(MEHUL)  EVER NEEDS TO BE CATHETERIZED MUST BE DISENGAGED PER UROLOGIST (DR. Yeager Speaker)     Family History   Problem Relation Age of Onset    Prostate Cancer Brother         bladder and prostate    Colon Cancer Mother         lung    Heart Disease Father     Heart Attack Father     COPD Father         COPD     Outpatient Medications Marked as Taking for the 5/26/22 encounter (Office Visit) with Marielos Saunders MD   Medication Sig Dispense Refill    alfuzosin (UROXATRAL) 10 MG extended release tablet Take 1 tablet by mouth daily 30 tablet 0    aspirin 81 MG chewable tablet Take 81 mg by mouth daily Stopped temporarily for this procedure.       sertraline (ZOLOFT) 100 MG tablet       loratadine (CLARITIN) 10 MG tablet       zolpidem (AMBIEN) 10 MG tablet       omeprazole (PRILOSEC) 20 MG delayed release capsule Take 20 mg by mouth Daily      vitamin D 25 MCG (1000 UT) CAPS Take by mouth      fenofibrate 160 MG tablet Take 160 mg by mouth daily      Ascorbic Acid (VITAMIN C) 500 MG tablet Take 500 mg by mouth daily       psyllium (METAMUCIL) 0.52 G capsule Take 0.52 g by mouth daily          Dye [iodides], Sulfamethoxazole, and Ciprofloxacin  Social History     Tobacco Use   Smoking Status Never Smoker   Smokeless Tobacco Never Used     (Ifpatient a smoker, smoking cessation counseling offered)    Social History     Substance and Sexual Activity   Alcohol Use No       REVIEW OF SYSTEMS:  Review of Systems    Physical Exam:      Vitals:    05/26/22 0929   BP: 123/75   Pulse: 77   Resp: 18   Temp: 96.8 °F (36 °C)     Body mass index is 28.13 kg/m². Patient is a 59 y.o. male in no acute distress and alert and oriented to person, place and time. Physical Exam  Constitutional: Patient in no acute distress. Neuro: Alert and oriented to person, place and time. Psych: Mood normal, affect normal  Skin: No rash noted  HEENT: Head: Normocephalic andatraumatic  Conjunctivae and EOM are normal. Pupils are equal, round  Nose:Normal  Right External Ear: Normal; Left External Ear: Normal  Mouth: Mucosa Moist  Neck: Supple  Lungs: Respiratory effort is normal  Cardiovascular: Warm & Pink  Abdomen: Soft, non-tender, non-distended with no CVA,  No flank tenderness,  Or hepatosplenomegaly       Assessment and Plan      1. Prostate cancer ([address]    2. Stress incontinence of urine    3. Ureteral stone           Plan:     cysto right ureteroscopy laser stent  HE HAS AN AUS, BE CAREFUL, USE SMALLER CYSTOSCOPE  Return for Surgery. Prescriptions Ordered:  No orders of the defined types were placed in this encounter. Orders Placed:  No orders of the defined types were placed in this encounter. Mary Kate Saldana MD    Agree with the ROS entered by the MA.

## 2022-06-06 ENCOUNTER — HOSPITAL ENCOUNTER (OUTPATIENT)
Dept: PREADMISSION TESTING | Age: 62
Discharge: HOME | End: 2022-06-10
Payer: COMMERCIAL

## 2022-06-06 VITALS
BODY MASS INDEX: 28.04 KG/M2 | RESPIRATION RATE: 18 BRPM | TEMPERATURE: 97.3 F | DIASTOLIC BLOOD PRESSURE: 84 MMHG | SYSTOLIC BLOOD PRESSURE: 141 MMHG | OXYGEN SATURATION: 97 % | HEIGHT: 68 IN | HEART RATE: 72 BPM | WEIGHT: 185 LBS

## 2022-06-06 LAB
ANION GAP SERPL CALCULATED.3IONS-SCNC: 13 MMOL/L (ref 9–17)
BUN BLDV-MCNC: 17 MG/DL (ref 8–23)
CHLORIDE BLD-SCNC: 108 MMOL/L (ref 98–107)
CO2: 22 MMOL/L (ref 20–31)
CREAT SERPL-MCNC: 0.98 MG/DL (ref 0.7–1.2)
GFR AFRICAN AMERICAN: >60 ML/MIN
GFR NON-AFRICAN AMERICAN: >60 ML/MIN
GFR SERPL CREATININE-BSD FRML MDRD: NORMAL ML/MIN/{1.73_M2}
GLUCOSE BLD-MCNC: 113 MG/DL (ref 70–99)
HCT VFR BLD CALC: 40.6 % (ref 40.7–50.3)
HEMOGLOBIN: 13.4 G/DL (ref 13–17)
POTASSIUM SERPL-SCNC: 4.4 MMOL/L (ref 3.7–5.3)
SODIUM BLD-SCNC: 143 MMOL/L (ref 135–144)

## 2022-06-06 PROCEDURE — 82947 ASSAY GLUCOSE BLOOD QUANT: CPT

## 2022-06-06 PROCEDURE — 93005 ELECTROCARDIOGRAM TRACING: CPT | Performed by: ANESTHESIOLOGY

## 2022-06-06 PROCEDURE — 85014 HEMATOCRIT: CPT

## 2022-06-06 PROCEDURE — 82565 ASSAY OF CREATININE: CPT

## 2022-06-06 PROCEDURE — 36415 COLL VENOUS BLD VENIPUNCTURE: CPT

## 2022-06-06 PROCEDURE — 85018 HEMOGLOBIN: CPT

## 2022-06-06 PROCEDURE — 84520 ASSAY OF UREA NITROGEN: CPT

## 2022-06-06 PROCEDURE — 87086 URINE CULTURE/COLONY COUNT: CPT

## 2022-06-06 PROCEDURE — 80051 ELECTROLYTE PANEL: CPT

## 2022-06-06 RX ORDER — SODIUM CHLORIDE, SODIUM LACTATE, POTASSIUM CHLORIDE, CALCIUM CHLORIDE 600; 310; 30; 20 MG/100ML; MG/100ML; MG/100ML; MG/100ML
1000 INJECTION, SOLUTION INTRAVENOUS CONTINUOUS
Status: CANCELLED | OUTPATIENT
Start: 2022-06-06

## 2022-06-06 NOTE — PROGRESS NOTES
Anesthesia Focused Assessment    Has patient ever tested positive for COVID? No    STOP-BANG Sleep Apnea Questionnaire    SNORE loudly (heard through closed doors)? Yes  TIRED, fatigued, sleepy during daytime? No  OBSERVED stopping breathing during sleep? Yes  High blood PRESSURE being treated? No    BMI over 35? No  AGE over 48? Yes  NECK circumference over 16\"? No  GENDER (male)? Yes             Total 4  High risk 5-8  Intermediate risk 3-4  Low risk 0-2    Obstructive Sleep Apnea: yes  If YES, machine used: cpap     Type 1 DM:   no  T2DM:  no    Coronary Artery Disease:  no  Hypertension:  no    Active smoker:  no  Drinks Alcohol:  no    Dentition: molar crown x 1    Defib / AICD / Pacemaker: no      Renal Failure/dialysis:  no    Patient was evaluated in PAT & anesthesia guidelines were applied. NPO guidelines, medication instructions and scheduled arrival time were reviewed with patient. Hx of anesthesia complications:  no  Family hx of anesthesia complications:  no                                                                                                                     Anesthesia contacted:   no  Medical or cardiac clearance ordered: no    ARTIFICIAL URINARY SPHINCTER PRESENT, DO NOT CATH PATIENT.     Ankit Barrera PA-C  6/6/22  9:53 AM

## 2022-06-06 NOTE — H&P
History and Physical    Pt Name: Jose Lozano  MRN: [id number]  YOB: 1960  Date of evaluation: 6/6/2022    SUBJECTIVE:   History of Chief Complaint:    Patient presents for PAT appointment. He complains of ureteral stone. He says that he was experiencing RLQ type pain, which he underwent imaging for. Patient says that initially the imaging did not detect his ureteral stone, but follow up imaging with urology detected the ureteral stone. Patient complains of intermittent pain relating to this. He is s/p prostatectomy for prostate cancer, also artificial urinary sphincter surgery. He has been scheduled for cystoscopy, ureteroscopy, stent placement. Past Medical History    has a past medical history of Anxiety, Bunion of left foot, Colon polyps, COVID-19 vaccine administered, Diverticulitis, GERD (gastroesophageal reflux disease), Hyperlipidemia, Prostate cancer ([facility] [address] Sleep apnea, Status post implantation of artificial urinary sphincter, Stress incontinence, Ureteral stone, Vitamin D deficiency, Wears prescription eyeglasses, and Wellness examination. Past Surgical History   has a past surgical history that includes Rotator cuff repair; Prostate Biopsy (N/A); Colonoscopy (2012, 2017); Prostatectomy (N/A, 3/4/2020); Scrotal surgery; and Colonoscopy (N/A, 9/17/2021). Medications  Prior to Admission medications    Medication Sig Start Date End Date Taking?  Authorizing Provider   alfuzosin (UROXATRAL) 10 MG extended release tablet Take 1 tablet by mouth daily 3/30/22   Kaur Mejias APRN - CNP   aspirin 81 MG chewable tablet Take 81 mg by mouth daily     Historical Provider, MD   sertraline (ZOLOFT) 100 MG tablet Take 100 mg by mouth daily  1/11/21   Historical Provider, MD   loratadine (CLARITIN) 10 MG tablet Take 10 mg by mouth daily  6/27/20   Historical Provider, MD   omeprazole (PRILOSEC) 20 MG delayed release capsule Take 20 mg by mouth Daily    Historical Provider, MD   vitamin D 25 MCG (1000 UT) CAPS Take 1 capsule by mouth daily     Historical Provider, MD   fenofibrate 160 MG tablet Take 160 mg by mouth daily    Historical Provider, MD   Ascorbic Acid (VITAMIN C) 500 MG tablet Take 500 mg by mouth daily     Historical Provider, MD   psyllium (METAMUCIL) 0.52 G capsule Take 0.52 g by mouth daily     Historical Provider, MD     Allergies  is allergic to dye [iodides], sulfamethoxazole, and ciprofloxacin. Family History  family history includes COPD in his father; Colon Cancer in his mother; Heart Attack in his father; Heart Disease in his father; Prostate Cancer in his brother. Social History   reports that he has never smoked. He has never used smokeless tobacco.   reports no history of alcohol use. reports no history of drug use. Marital Status   Occupation retired    Review of Systems:  CONSTITUTIONAL:   negative for fevers, chills, fatigue and malaise    EYES:   negative for double vision, blurred vision and photophobia    HEENT:   negative for tinnitus, epistaxis and sore throat     RESPIRATORY:   negative for cough, shortness of breath, wheezing     CARDIOVASCULAR:   negative for chest pain, palpitations, syncope, edema     GASTROINTESTINAL:   negative for nausea, vomiting     GENITOURINARY:   See HPI   MUSCULOSKELETAL:   negative for neck or back pain     NEUROLOGICAL:   Negative for weakness and tingling  negative for headaches and dizziness     PSYCHIATRIC:   negative for anxiety         OBJECTIVE:   VITALS:  height is 5' 8\" (1.727 m) and weight is 185 lb (83.9 kg). His temporal temperature is 97.3 °F (36.3 °C). His blood pressure is 141/84 (abnormal) and his pulse is 72. His respiration is 18 and oxygen saturation is 97%. CONSTITUTIONAL:alert & cooperative, no acute distress. Very pleasant and talkative. SKIN:  Warm and dry, no rashes on exposed areas of skin. Well tanned skin. HEAD:  Normocephalic, atraumatic   EYES: EOMs intact. Wearing glasses.   EARS:  Hearing grossly WNL.    NOSE:  Nares patent. No rhinorrhea. MOUTH/THROAT:  benign  NECK:good ROM   LUNGS: Clear to auscultation bilaterally, no wheezes, rales, or rhonchi. CARDIOVASCULAR: Heart sounds are normal.  Regular rate and rhythm without murmur. ABDOMEN: soft, non tender, non distended. EXTREMITIES: no edema bilateral lower extremities. Testing:   EK22  Labs pending: drawn 2022     IMPRESSIONS:   1. Ureteral stone  2.  has a past medical history of Anxiety (2022), Bunion of left foot, Colon polyps, COVID-19 vaccine administered (3-, 2021), Diverticulitis, GERD (gastroesophageal reflux disease), Hyperlipidemia, Prostate cancer [facility]), Sleep apnea, Status post implantation of artificial urinary sphincter (2021), Stress incontinence, Ureteral stone, Vitamin D deficiency, Wears prescription eyeglasses, and Wellness examination (2022). PLANS:   1.  Laser cystoscopy, ureteroscopy, stent placement    LANDON Larios PA-C  Electronically signed 2022 at 10:21 AM

## 2022-06-06 NOTE — H&P (VIEW-ONLY)
History and Physical    Pt Name: Brennan Mcdowell  MRN: [id number]  YOB: 1960  Date of evaluation: 6/6/2022    SUBJECTIVE:   History of Chief Complaint:    Patient presents for PAT appointment. He complains of ureteral stone. He says that he was experiencing RLQ type pain, which he underwent imaging for. Patient says that initially the imaging did not detect his ureteral stone, but follow up imaging with urology detected the ureteral stone. Patient complains of intermittent pain relating to this. He is s/p prostatectomy for prostate cancer, also artificial urinary sphincter surgery. He has been scheduled for cystoscopy, ureteroscopy, stent placement. Past Medical History    has a past medical history of Anxiety, Bunion of left foot, Colon polyps, COVID-19 vaccine administered, Diverticulitis, GERD (gastroesophageal reflux disease), Hyperlipidemia, Prostate cancer ([facility] [address] Sleep apnea, Status post implantation of artificial urinary sphincter, Stress incontinence, Ureteral stone, Vitamin D deficiency, Wears prescription eyeglasses, and Wellness examination. Past Surgical History   has a past surgical history that includes Rotator cuff repair; Prostate Biopsy (N/A); Colonoscopy (2012, 2017); Prostatectomy (N/A, 3/4/2020); Scrotal surgery; and Colonoscopy (N/A, 9/17/2021). Medications  Prior to Admission medications    Medication Sig Start Date End Date Taking?  Authorizing Provider   alfuzosin (UROXATRAL) 10 MG extended release tablet Take 1 tablet by mouth daily 3/30/22   MARTINEZ Pemberton - CNP   aspirin 81 MG chewable tablet Take 81 mg by mouth daily     Historical Provider, MD   sertraline (ZOLOFT) 100 MG tablet Take 100 mg by mouth daily  1/11/21   Historical Provider, MD   loratadine (CLARITIN) 10 MG tablet Take 10 mg by mouth daily  6/27/20   Historical Provider, MD   omeprazole (PRILOSEC) 20 MG delayed release capsule Take 20 mg by mouth Daily    Historical Provider, MD   vitamin D 25 MCG (1000 UT) CAPS Take 1 capsule by mouth daily     Historical Provider, MD   fenofibrate 160 MG tablet Take 160 mg by mouth daily    Historical Provider, MD   Ascorbic Acid (VITAMIN C) 500 MG tablet Take 500 mg by mouth daily     Historical Provider, MD   psyllium (METAMUCIL) 0.52 G capsule Take 0.52 g by mouth daily     Historical Provider, MD     Allergies  is allergic to dye [iodides], sulfamethoxazole, and ciprofloxacin. Family History  family history includes COPD in his father; Colon Cancer in his mother; Heart Attack in his father; Heart Disease in his father; Prostate Cancer in his brother. Social History   reports that he has never smoked. He has never used smokeless tobacco.   reports no history of alcohol use. reports no history of drug use. Marital Status   Occupation retired    Review of Systems:  CONSTITUTIONAL:   negative for fevers, chills, fatigue and malaise    EYES:   negative for double vision, blurred vision and photophobia    HEENT:   negative for tinnitus, epistaxis and sore throat     RESPIRATORY:   negative for cough, shortness of breath, wheezing     CARDIOVASCULAR:   negative for chest pain, palpitations, syncope, edema     GASTROINTESTINAL:   negative for nausea, vomiting     GENITOURINARY:   See HPI   MUSCULOSKELETAL:   negative for neck or back pain     NEUROLOGICAL:   Negative for weakness and tingling  negative for headaches and dizziness     PSYCHIATRIC:   negative for anxiety         OBJECTIVE:   VITALS:  height is 5' 8\" (1.727 m) and weight is 185 lb (83.9 kg). His temporal temperature is 97.3 °F (36.3 °C). His blood pressure is 141/84 (abnormal) and his pulse is 72. His respiration is 18 and oxygen saturation is 97%. CONSTITUTIONAL:alert & cooperative, no acute distress. Very pleasant and talkative. SKIN:  Warm and dry, no rashes on exposed areas of skin. Well tanned skin. HEAD:  Normocephalic, atraumatic   EYES: EOMs intact. Wearing glasses.   EARS:  Hearing grossly WNL.    NOSE:  Nares patent. No rhinorrhea. MOUTH/THROAT:  benign  NECK:good ROM   LUNGS: Clear to auscultation bilaterally, no wheezes, rales, or rhonchi. CARDIOVASCULAR: Heart sounds are normal.  Regular rate and rhythm without murmur. ABDOMEN: soft, non tender, non distended. EXTREMITIES: no edema bilateral lower extremities. Testing:   EK22  Labs pending: drawn 2022     IMPRESSIONS:   1. Ureteral stone  2.  has a past medical history of Anxiety (2022), Bunion of left foot, Colon polyps, COVID-19 vaccine administered (3-, 2021), Diverticulitis, GERD (gastroesophageal reflux disease), Hyperlipidemia, Prostate cancer [facility]), Sleep apnea, Status post implantation of artificial urinary sphincter (2021), Stress incontinence, Ureteral stone, Vitamin D deficiency, Wears prescription eyeglasses, and Wellness examination (2022). PLANS:   1.  Laser cystoscopy, ureteroscopy, stent placement    LANDON Coelho PA-C  Electronically signed 2022 at 10:21 AM

## 2022-06-07 LAB
CULTURE: NO GROWTH
EKG ATRIAL RATE: 59 BPM
EKG P AXIS: 65 DEGREES
EKG P-R INTERVAL: 176 MS
EKG Q-T INTERVAL: 398 MS
EKG QRS DURATION: 80 MS
EKG QTC CALCULATION (BAZETT): 394 MS
EKG R AXIS: 48 DEGREES
EKG T AXIS: 31 DEGREES
EKG VENTRICULAR RATE: 59 BPM
SPECIMEN DESCRIPTION: NORMAL

## 2022-06-13 ENCOUNTER — TELEPHONE (OUTPATIENT)
Dept: UROLOGY | Age: 62
End: 2022-06-13

## 2022-06-14 ENCOUNTER — HOSPITAL ENCOUNTER (OUTPATIENT)
Age: 62
Setting detail: OUTPATIENT SURGERY
Discharge: HOME | End: 2022-06-14
Attending: UROLOGY | Admitting: UROLOGY
Payer: COMMERCIAL

## 2022-06-14 ENCOUNTER — APPOINTMENT (OUTPATIENT)
Dept: GENERAL RADIOLOGY | Age: 62
End: 2022-06-14
Attending: UROLOGY
Payer: COMMERCIAL

## 2022-06-14 ENCOUNTER — ANESTHESIA EVENT (OUTPATIENT)
Dept: OPERATING ROOM | Age: 62
End: 2022-06-14
Payer: COMMERCIAL

## 2022-06-14 ENCOUNTER — ANESTHESIA (OUTPATIENT)
Dept: OPERATING ROOM | Age: 62
End: 2022-06-14
Payer: COMMERCIAL

## 2022-06-14 VITALS
RESPIRATION RATE: 12 BRPM | SYSTOLIC BLOOD PRESSURE: 125 MMHG | BODY MASS INDEX: 28.04 KG/M2 | HEART RATE: 74 BPM | HEIGHT: 68 IN | TEMPERATURE: 96.8 F | DIASTOLIC BLOOD PRESSURE: 83 MMHG | WEIGHT: 185 LBS | OXYGEN SATURATION: 97 %

## 2022-06-14 DIAGNOSIS — G89.18 POST-OP PAIN: Primary | ICD-10-CM

## 2022-06-14 PROCEDURE — 3700000001 HC ADD 15 MINUTES (ANESTHESIA): Performed by: UROLOGY

## 2022-06-14 PROCEDURE — 3209999900 FLUORO FOR SURGICAL PROCEDURES

## 2022-06-14 PROCEDURE — 7100000001 HC PACU RECOVERY - ADDTL 15 MIN: Performed by: UROLOGY

## 2022-06-14 PROCEDURE — 6360000002 HC RX W HCPCS: Performed by: PHYSICIAN ASSISTANT

## 2022-06-14 PROCEDURE — 6370000000 HC RX 637 (ALT 250 FOR IP): Performed by: ANESTHESIOLOGY

## 2022-06-14 PROCEDURE — 2720000010 HC SURG SUPPLY STERILE: Performed by: UROLOGY

## 2022-06-14 PROCEDURE — 2580000003 HC RX 258: Performed by: NURSE ANESTHETIST, CERTIFIED REGISTERED

## 2022-06-14 PROCEDURE — 3700000000 HC ANESTHESIA ATTENDED CARE: Performed by: UROLOGY

## 2022-06-14 PROCEDURE — 3600000004 HC SURGERY LEVEL 4 BASE: Performed by: UROLOGY

## 2022-06-14 PROCEDURE — C1758 CATHETER, URETERAL: HCPCS | Performed by: UROLOGY

## 2022-06-14 PROCEDURE — 6360000002 HC RX W HCPCS: Performed by: NURSE ANESTHETIST, CERTIFIED REGISTERED

## 2022-06-14 PROCEDURE — 7100000000 HC PACU RECOVERY - FIRST 15 MIN: Performed by: UROLOGY

## 2022-06-14 PROCEDURE — 2709999900 HC NON-CHARGEABLE SUPPLY: Performed by: UROLOGY

## 2022-06-14 PROCEDURE — 3600000014 HC SURGERY LEVEL 4 ADDTL 15MIN: Performed by: UROLOGY

## 2022-06-14 PROCEDURE — 2500000003 HC RX 250 WO HCPCS: Performed by: NURSE ANESTHETIST, CERTIFIED REGISTERED

## 2022-06-14 PROCEDURE — 2580000003 HC RX 258: Performed by: UROLOGY

## 2022-06-14 PROCEDURE — C2617 STENT, NON-COR, TEM W/O DEL: HCPCS | Performed by: UROLOGY

## 2022-06-14 PROCEDURE — 7100000010 HC PHASE II RECOVERY - FIRST 15 MIN: Performed by: UROLOGY

## 2022-06-14 PROCEDURE — 6360000002 HC RX W HCPCS

## 2022-06-14 PROCEDURE — C1769 GUIDE WIRE: HCPCS | Performed by: UROLOGY

## 2022-06-14 PROCEDURE — 6360000002 HC RX W HCPCS: Performed by: ANESTHESIOLOGY

## 2022-06-14 DEVICE — URETERAL STENT
Type: IMPLANTABLE DEVICE | Site: URETER | Status: FUNCTIONAL
Brand: PERCUFLEX™ PLUS

## 2022-06-14 RX ORDER — SODIUM CHLORIDE 9 MG/ML
INJECTION, SOLUTION INTRAVENOUS PRN
Status: DISCONTINUED | OUTPATIENT
Start: 2022-06-14 | End: 2022-06-14 | Stop reason: HOSPADM

## 2022-06-14 RX ORDER — MIDAZOLAM HYDROCHLORIDE 2 MG/2ML
1 INJECTION, SOLUTION INTRAMUSCULAR; INTRAVENOUS EVERY 10 MIN PRN
Status: DISCONTINUED | OUTPATIENT
Start: 2022-06-14 | End: 2022-06-14 | Stop reason: HOSPADM

## 2022-06-14 RX ORDER — SODIUM CHLORIDE 0.9 % (FLUSH) 0.9 %
5-40 SYRINGE (ML) INJECTION PRN
Status: DISCONTINUED | OUTPATIENT
Start: 2022-06-14 | End: 2022-06-14 | Stop reason: HOSPADM

## 2022-06-14 RX ORDER — ONDANSETRON 2 MG/ML
4 INJECTION INTRAMUSCULAR; INTRAVENOUS
Status: DISCONTINUED | OUTPATIENT
Start: 2022-06-14 | End: 2022-06-14 | Stop reason: HOSPADM

## 2022-06-14 RX ORDER — ONDANSETRON 2 MG/ML
INJECTION INTRAMUSCULAR; INTRAVENOUS PRN
Status: DISCONTINUED | OUTPATIENT
Start: 2022-06-14 | End: 2022-06-14 | Stop reason: SDUPTHER

## 2022-06-14 RX ORDER — CEPHALEXIN 500 MG/1
500 CAPSULE ORAL 3 TIMES DAILY
Qty: 9 CAPSULE | Refills: 0 | Status: SHIPPED | OUTPATIENT
Start: 2022-06-14 | End: 2022-06-17

## 2022-06-14 RX ORDER — LIDOCAINE HYDROCHLORIDE 10 MG/ML
1 INJECTION, SOLUTION EPIDURAL; INFILTRATION; INTRACAUDAL; PERINEURAL
Status: DISCONTINUED | OUTPATIENT
Start: 2022-06-14 | End: 2022-06-14 | Stop reason: HOSPADM

## 2022-06-14 RX ORDER — SODIUM CHLORIDE, SODIUM LACTATE, POTASSIUM CHLORIDE, CALCIUM CHLORIDE 600; 310; 30; 20 MG/100ML; MG/100ML; MG/100ML; MG/100ML
INJECTION, SOLUTION INTRAVENOUS CONTINUOUS PRN
Status: DISCONTINUED | OUTPATIENT
Start: 2022-06-14 | End: 2022-06-14 | Stop reason: SDUPTHER

## 2022-06-14 RX ORDER — FENTANYL CITRATE 50 UG/ML
50 INJECTION, SOLUTION INTRAMUSCULAR; INTRAVENOUS
Status: DISCONTINUED | OUTPATIENT
Start: 2022-06-14 | End: 2022-06-14 | Stop reason: HOSPADM

## 2022-06-14 RX ORDER — LIDOCAINE HYDROCHLORIDE 10 MG/ML
INJECTION, SOLUTION EPIDURAL; INFILTRATION; INTRACAUDAL; PERINEURAL PRN
Status: DISCONTINUED | OUTPATIENT
Start: 2022-06-14 | End: 2022-06-14 | Stop reason: SDUPTHER

## 2022-06-14 RX ORDER — FENTANYL CITRATE 50 UG/ML
25 INJECTION, SOLUTION INTRAMUSCULAR; INTRAVENOUS
Status: DISCONTINUED | OUTPATIENT
Start: 2022-06-14 | End: 2022-06-14 | Stop reason: HOSPADM

## 2022-06-14 RX ORDER — PROPOFOL 10 MG/ML
INJECTION, EMULSION INTRAVENOUS PRN
Status: DISCONTINUED | OUTPATIENT
Start: 2022-06-14 | End: 2022-06-14 | Stop reason: SDUPTHER

## 2022-06-14 RX ORDER — SODIUM CHLORIDE, SODIUM LACTATE, POTASSIUM CHLORIDE, CALCIUM CHLORIDE 600; 310; 30; 20 MG/100ML; MG/100ML; MG/100ML; MG/100ML
INJECTION, SOLUTION INTRAVENOUS CONTINUOUS
Status: DISCONTINUED | OUTPATIENT
Start: 2022-06-14 | End: 2022-06-14 | Stop reason: HOSPADM

## 2022-06-14 RX ORDER — FENTANYL CITRATE 50 UG/ML
INJECTION, SOLUTION INTRAMUSCULAR; INTRAVENOUS PRN
Status: DISCONTINUED | OUTPATIENT
Start: 2022-06-14 | End: 2022-06-14 | Stop reason: SDUPTHER

## 2022-06-14 RX ORDER — OXYCODONE HYDROCHLORIDE AND ACETAMINOPHEN 5; 325 MG/1; MG/1
1 TABLET ORAL
Status: COMPLETED | OUTPATIENT
Start: 2022-06-14 | End: 2022-06-14

## 2022-06-14 RX ORDER — FENTANYL CITRATE 50 UG/ML
50 INJECTION, SOLUTION INTRAMUSCULAR; INTRAVENOUS EVERY 5 MIN PRN
Status: DISCONTINUED | OUTPATIENT
Start: 2022-06-14 | End: 2022-06-14 | Stop reason: HOSPADM

## 2022-06-14 RX ORDER — FENTANYL CITRATE 50 UG/ML
25 INJECTION, SOLUTION INTRAMUSCULAR; INTRAVENOUS EVERY 5 MIN PRN
Status: DISCONTINUED | OUTPATIENT
Start: 2022-06-14 | End: 2022-06-14 | Stop reason: HOSPADM

## 2022-06-14 RX ORDER — SODIUM CHLORIDE 0.9 % (FLUSH) 0.9 %
5-40 SYRINGE (ML) INJECTION EVERY 12 HOURS SCHEDULED
Status: DISCONTINUED | OUTPATIENT
Start: 2022-06-14 | End: 2022-06-14 | Stop reason: HOSPADM

## 2022-06-14 RX ORDER — DOCUSATE SODIUM 100 MG/1
100 CAPSULE, LIQUID FILLED ORAL 2 TIMES DAILY
Qty: 60 CAPSULE | Refills: 0 | Status: SHIPPED | OUTPATIENT
Start: 2022-06-14 | End: 2022-07-14

## 2022-06-14 RX ORDER — MAGNESIUM HYDROXIDE 1200 MG/15ML
LIQUID ORAL CONTINUOUS PRN
Status: DISCONTINUED | OUTPATIENT
Start: 2022-06-14 | End: 2022-06-14 | Stop reason: HOSPADM

## 2022-06-14 RX ORDER — MIDAZOLAM HYDROCHLORIDE 1 MG/ML
INJECTION INTRAMUSCULAR; INTRAVENOUS
Status: COMPLETED
Start: 2022-06-14 | End: 2022-06-14

## 2022-06-14 RX ORDER — SODIUM CHLORIDE, SODIUM LACTATE, POTASSIUM CHLORIDE, CALCIUM CHLORIDE 600; 310; 30; 20 MG/100ML; MG/100ML; MG/100ML; MG/100ML
1000 INJECTION, SOLUTION INTRAVENOUS CONTINUOUS
Status: DISCONTINUED | OUTPATIENT
Start: 2022-06-14 | End: 2022-06-14 | Stop reason: HOSPADM

## 2022-06-14 RX ORDER — MAGNESIUM HYDROXIDE 1200 MG/15ML
LIQUID ORAL PRN
Status: DISCONTINUED | OUTPATIENT
Start: 2022-06-14 | End: 2022-06-14 | Stop reason: HOSPADM

## 2022-06-14 RX ORDER — GLYCOPYRROLATE 0.2 MG/ML
INJECTION INTRAMUSCULAR; INTRAVENOUS PRN
Status: DISCONTINUED | OUTPATIENT
Start: 2022-06-14 | End: 2022-06-14 | Stop reason: SDUPTHER

## 2022-06-14 RX ORDER — DIPHENHYDRAMINE HYDROCHLORIDE 50 MG/ML
12.5 INJECTION INTRAMUSCULAR; INTRAVENOUS
Status: DISCONTINUED | OUTPATIENT
Start: 2022-06-14 | End: 2022-06-14 | Stop reason: HOSPADM

## 2022-06-14 RX ORDER — OXYCODONE HYDROCHLORIDE AND ACETAMINOPHEN 5; 325 MG/1; MG/1
1 TABLET ORAL EVERY 6 HOURS PRN
Qty: 15 TABLET | Refills: 0 | Status: SHIPPED | OUTPATIENT
Start: 2022-06-14 | End: 2022-06-19

## 2022-06-14 RX ADMIN — MIDAZOLAM HYDROCHLORIDE 1 MG: 1 INJECTION, SOLUTION INTRAMUSCULAR; INTRAVENOUS at 13:25

## 2022-06-14 RX ADMIN — Medication 2000 MG: at 14:27

## 2022-06-14 RX ADMIN — OXYCODONE HYDROCHLORIDE AND ACETAMINOPHEN 1 TABLET: 5; 325 TABLET ORAL at 15:42

## 2022-06-14 RX ADMIN — LIDOCAINE HYDROCHLORIDE 50 MG: 10 INJECTION, SOLUTION EPIDURAL; INFILTRATION; INTRACAUDAL; PERINEURAL at 14:21

## 2022-06-14 RX ADMIN — FENTANYL CITRATE 50 MCG: 50 INJECTION, SOLUTION INTRAMUSCULAR; INTRAVENOUS at 15:48

## 2022-06-14 RX ADMIN — GLYCOPYRROLATE 0.2 MG: 0.2 INJECTION INTRAMUSCULAR; INTRAVENOUS at 14:41

## 2022-06-14 RX ADMIN — FENTANYL CITRATE 50 MCG: 50 INJECTION, SOLUTION INTRAMUSCULAR; INTRAVENOUS at 15:40

## 2022-06-14 RX ADMIN — SODIUM CHLORIDE, POTASSIUM CHLORIDE, SODIUM LACTATE AND CALCIUM CHLORIDE: 600; 310; 30; 20 INJECTION, SOLUTION INTRAVENOUS at 15:19

## 2022-06-14 RX ADMIN — MIDAZOLAM HYDROCHLORIDE 1 MG: 2 INJECTION, SOLUTION INTRAMUSCULAR; INTRAVENOUS at 13:25

## 2022-06-14 RX ADMIN — PROPOFOL 200 MG: 10 INJECTION, EMULSION INTRAVENOUS at 14:21

## 2022-06-14 RX ADMIN — ONDANSETRON 4 MG: 2 INJECTION INTRAMUSCULAR; INTRAVENOUS at 15:03

## 2022-06-14 RX ADMIN — FENTANYL CITRATE 100 MCG: 50 INJECTION, SOLUTION INTRAMUSCULAR; INTRAVENOUS at 14:21

## 2022-06-14 RX ADMIN — SODIUM CHLORIDE, POTASSIUM CHLORIDE, SODIUM LACTATE AND CALCIUM CHLORIDE: 600; 310; 30; 20 INJECTION, SOLUTION INTRAVENOUS at 14:13

## 2022-06-14 ASSESSMENT — PAIN SCALES - GENERAL
PAINLEVEL_OUTOF10: 7
PAINLEVEL_OUTOF10: 7
PAINLEVEL_OUTOF10: 0
PAINLEVEL_OUTOF10: 7
PAINLEVEL_OUTOF10: 0

## 2022-06-14 ASSESSMENT — LIFESTYLE VARIABLES: SMOKING_STATUS: 0

## 2022-06-14 ASSESSMENT — PAIN DESCRIPTION - LOCATION
LOCATION: PENIS

## 2022-06-14 ASSESSMENT — ENCOUNTER SYMPTOMS
STRIDOR: 0
SHORTNESS OF BREATH: 0

## 2022-06-14 NOTE — OP NOTE
Operative Note    NAME: Wagner Pruett   MRN: [id number]  : 1960  PROCEDURE DATE: 22    Surgeon: Johnathan Lemus MD    Resident(s): Fernando Mccall MD-PGY5, Violeta Lorenz MD-PGY3    Pre-op Diagnosis: Right obstructing distal 5 mm ureteral calculus, history of stress urinary incontinence status post radical prostatectomy now with artificial urinary sphincter    Post-op Diagnosis: Right obstructing distal 5 mm ureteral calculus, history of stress urinary incontinence status post radical prostatectomy now with artificial urinary sphincter     Procedure:   1. Flexible cystourethroscopy. 2. Right-sided semi-rigid ureteroscopy. 3. Right-sided holmium laser lithotripsy. 4. Right-sided stone basketing. 5. Right-sided ureteral stent placement - 4.8x26. Anesthesia: General    Antibiotics: 2000 mg IV Ancef         Indications:   Mauro Mercado is a 59 y.o. male with above presentation. He has a history of artificial urinary sphincter placement for stress urinary incontinence after radical prostatectomy for prostate cancer. Risks, alternatives, and benefits were discussed and the patient elected to proceed. Informed consent was obtained. Findings:   -Right distal ureteral calculus successfully fragmented and fragments evacuated using stone basket    Procedure details: The patient was brought back from the preoperative holding area to the  operating room, and was transferred to the operating table. General anesthesia was induced appropriately. Preoperative antibiotics were given and EPC cuffs were placed and confirmed to be working. The patient was placed in dorsal lithotomy position and prepped and draped in the usual sterile fashion. Surgical timeout confirming patient, procedure, and positioning was performed. We deactivated the patient's artificial urinary sphincter.   We began by inserting a flexible cystoscope into the patient's urethral meatus and advancing into the bladder without complication. Prostate was surgically absent. A complete cystoscopic evaluation of the bladder was performed in stepwise manner evaluating dome, left lateral wall, trigone/floor, posterior wall, and right lateral wall. No gross abnormalities were identified. We then focused our attention on the right ureteral orifice, which we cannulated with our 0.035 glidewire, and advanced up to renal pelvis. We then used a dual lumen sheath to place a second wire, which was appropriately secured. Once in good position, we advanced our semirigid ureteroscope over the working wire to the distal ureter under direct visualization and the wire was subsequently removed. We identified the ureteral calculus and using a 200 micron holmium laser fiber we fragmented the calculus into submillimeter fragments that would be easily passed. 0 tip nitinol stone basket was used to remove all fragments to the bladder. We withdrew the ureteroscope and visualized the entire ureter. No stone fragments were identified and no damage to the ureter was identified. The ureteroscope was then removed and a 4.8 [place] x26 cm double-J ureteral stent with strings was advanced over the wire into the kidney. We noted appropriate placement in the upper collecting system using fluoroscopy. There was a good curl noted in the bladder under cystoscopic visualization. The patient's bladder was drained and artificial sphincter was reactivated. The procedure was subsequently terminated. Patient tolerated procedure well without complication and was taken to PACU in good and stable condition. Dr. Ramy Duff was present for all critical portions of the procedure.     Complications: None     Drains: None    Specimens: None    Implants: Right-sided 4.8 Polish x26 cm double-J ureteral stent with strings     Estimated Blood Loss: Minimal    IV Fluids: Crystalloid      Condition: Good and stable    Disposition: PACU    Plan: Patient will be discharged from the postoperative recovery area. He was instructed to remove stent on strings in 5 days.     Dorothy Bartlett MD  PGY-5, [address] Department of Urology   06/14/22

## 2022-06-14 NOTE — INTERVAL H&P NOTE
Update History & Physical    The patient's History and Physical of June 6, 2022 was reviewed with the patient and I examined the patient. There was no change. The surgical site was confirmed by the patient and me. Plan: Right ureteroscopy, holmium laser lithotripsy and stent placement. History of RALP and has an AUS in place    The risks, benefits, expected outcome, and alternative to the recommended procedure have been discussed with the patient. Patient understands and wants to proceed with the procedure.      Electronically signed by Eileen Odonnell PA-C on 6/14/2022 at 11:16 AM

## 2022-06-14 NOTE — ANESTHESIA PRE PROCEDURE
Department of Anesthesiology  Preprocedure Note       Name:  Selene Morse   Age:  59 y.o.  :  1960                                          MRN:  [id number]         Date:  2022      Surgeon: Cheri Green):  Vanessa Son MD    Procedure: Procedure(s):  HOLMIUM LASER,  CYSTOSCOPY,  URETEROSCOPY STENT PLACEMENT   (REQ. SMALL SCOPE)    Medications prior to admission:   Prior to Admission medications    Medication Sig Start Date End Date Taking? Authorizing Provider   cephALEXin (KEFLEX) 500 MG capsule Take 1 capsule by mouth 3 times daily for 3 days 22  Marcy Gonzalez PA-C   oxyCODONE-acetaminophen (PERCOCET) 5-325 MG per tablet Take 1 tablet by mouth every 6 hours as needed for Pain for up to 5 days. May take 2 tablets if needed 22  Marcy Gonzalez PA-C   docusate sodium (COLACE) 100 MG capsule Take 1 capsule by mouth 2 times daily 22  Marcy Gonzalez PA-C   alfuzosin (UROXATRAL) 10 MG extended release tablet Take 1 tablet by mouth daily 3/30/22   Marielena Murphy APRN - CNP   aspirin 81 MG chewable tablet Take 81 mg by mouth daily     Historical Provider, MD   sertraline (ZOLOFT) 100 MG tablet Take 100 mg by mouth daily  21   Historical Provider, MD   loratadine (CLARITIN) 10 MG tablet Take 10 mg by mouth daily  20   Historical Provider, MD   omeprazole (PRILOSEC) 20 MG delayed release capsule Take 20 mg by mouth Daily    Historical Provider, MD   vitamin D 25 MCG (1000 UT) CAPS Take 1 capsule by mouth daily     Historical Provider, MD   fenofibrate 160 MG tablet Take 160 mg by mouth daily    Historical Provider, MD   Ascorbic Acid (VITAMIN C) 500 MG tablet Take 500 mg by mouth daily     Historical Provider, MD   psyllium (METAMUCIL) 0.52 G capsule Take 0.52 g by mouth daily     Historical Provider, MD       Current medications:    No current facility-administered medications for this visit.      Current Outpatient Medications   Medication Sig Dispense Refill    cephALEXin (KEFLEX) 500 MG capsule Take 1 capsule by mouth 3 times daily for 3 days 9 capsule 0    oxyCODONE-acetaminophen (PERCOCET) 5-325 MG per tablet Take 1 tablet by mouth every 6 hours as needed for Pain for up to 5 days. May take 2 tablets if needed 15 tablet 0    docusate sodium (COLACE) 100 MG capsule Take 1 capsule by mouth 2 times daily 60 capsule 0     Facility-Administered Medications Ordered in Other Visits   Medication Dose Route Frequency Provider Last Rate Last Admin    ceFAZolin (ANCEF) 2000 mg in sterile water 20 mL IV syringe  2,000 mg IntraVENous On Call to [address] [facility], PA-C        lactated ringers infusion 1,000 mL  1,000 mL IntraVENous Continuous Mey Medina MD           Allergies: Allergies   Allergen Reactions    Dye [Iodides]      Rash after cystogram on the groin     Sulfamethoxazole Swelling    Ciprofloxacin Rash       Problem List:    Patient Active Problem List   Diagnosis Code    Prostate cancer ([facility] Rolf Lawson       Past Medical History:        Diagnosis Date    Anxiety 06/06/2022    stress from work but now retired   Blockchain of left foot     Colon polyps     COVID-19 vaccine administered 3-, 2-    [place]    Diverticulitis     GERD (gastroesophageal reflux disease)     Hyperlipidemia     Dr. Eve Hong [facility])     prostate    Sleep apnea     C-PAP AT HS    Status post implantation of artificial urinary sphincter 05/20/2021    DR. SEBASTIAN, ACTIVATED ON JULY 1ST. IF PT. EVERY NEEDS TO  BE CATHETERIZED NEEDS DISENGAGEMENT PER. UROLOGIST DR. SEBASTIAN.     Stress incontinence     Ureteral stone     Right    Vitamin D deficiency     Wears prescription eyeglasses     Wellness examination 06/06/2022    Dr. Namita Mejia last seen oct 2021       Past Surgical History:        Procedure Laterality Date    COLONOSCOPY  2012, 2017    COLONOSCOPY N/A 9/17/2021    COLORECTAL CANCER SCREENING, NOT HIGH RISK performed by Rebecca Zimmerman MD at [address] [place] [address] N/A     2/7/2020    PROSTATECTOMY N/A 3/4/2020    XI ROBOTIC LAPAROSCOPIC PROSTATECTOMY WITH LYMPH NODE DISSECTION [facility]      ARTIFICIAL URINARY SPHINCTER PLACED AND IF PT.-(MEHUL)  EVER NEEDS TO BE CATHETERIZED MUST BE DISENGAGED PER UROLOGIST (DR. Torrey Guillen)       Social History:    Social History     Tobacco Use    Smoking status: Never Smoker    Smokeless tobacco: Never Used   Substance Use Topics    Alcohol use: No                                Counseling given: Not Answered      Vital Signs (Current): There were no vitals filed for this visit. BP Readings from Last 3 Encounters:   06/14/22 (!) 149/80   06/06/22 (!) 141/84   05/26/22 123/75       NPO Status:                                                                                 BMI:   Wt Readings from Last 3 Encounters:   06/14/22 185 lb (83.9 kg)   06/06/22 185 lb (83.9 kg)   05/26/22 185 lb (83.9 kg)     There is no height or weight on file to calculate BMI.    CBC:   Lab Results   Component Value Date    WBC 10.5 03/05/2020    RBC 4.06 03/05/2020    HGB 13.4 06/06/2022    HCT 40.6 06/06/2022    MCV 91.9 03/05/2020    RDW 12.6 03/05/2020     03/05/2020     LR    CMP:   Lab Results   Component Value Date     06/06/2022    K 4.4 06/06/2022     06/06/2022    CO2 22 06/06/2022    BUN 17 06/06/2022    CREATININE 0.98 06/06/2022    GFRAA >60 06/06/2022    LABGLOM >60 06/06/2022    GLUCOSE 113 06/06/2022    CALCIUM 8.8 03/05/2020       POC Tests: No results for input(s): POCGLU, POCNA, POCK, POCCL, POCBUN, POCHEMO, POCHCT in the last 72 hours.     Coags: No results found for: PROTIME, INR, APTT    HCG (If Applicable): No results found for: PREGTESTUR, PREGSERUM, HCG, HCGQUANT     ABGs: No results found for: PHART, PO2ART, QRU0PAE, HUA0BLG, BEART, S5SEWXJD     Type & Screen (If Applicable):  No results found for: LABABO, LABRH    Drug/Infectious Status (If Applicable):  No results found for: HIV, HEPCAB    COVID-19 Screening (If Applicable):   Lab Results   Component Value Date    COVID19 Not Detected 05/18/2020           Anesthesia Evaluation  Patient summary reviewed and Nursing notes reviewed no history of anesthetic complications:   Airway: Mallampati: II  TM distance: >3 FB   Neck ROM: full  Mouth opening: > = 3 FB   Dental: normal exam         Pulmonary:normal exam  breath sounds clear to auscultation  (+) sleep apnea: on CPAP,      (-) pneumonia, COPD, asthma, shortness of breath, recent URI, rhonchi, wheezes, rales, stridor, not a current smoker and no decreased breath sounds          Patient did not smoke on day of surgery. Cardiovascular:  Exercise tolerance: good (>4 METS),   (+) hyperlipidemia    (-) pacemaker, hypertension, valvular problems/murmurs, past MI, CAD, CABG/stent, dysrhythmias,  angina,  CHF, orthopnea, PND,  WOOTEN, murmur, weak pulses,  friction rub, systolic click, carotid bruit,  JVD, peripheral edema and no pulmonary hypertension    ECG reviewed  Rhythm: regular  Rate: normal           Beta Blocker:  Not on Beta Blocker         Neuro/Psych:   Negative Neuro/Psych ROS     (-) seizures, neuromuscular disease, TIA, CVA, headaches, psychiatric history and depression/anxiety            GI/Hepatic/Renal: Neg GI/Hepatic/Renal ROS       (-) hiatal hernia, GERD, PUD, hepatitis, liver disease, no renal disease, bowel prep and no morbid obesity       Endo/Other:    (+) no malignancy/cancer. (-) diabetes mellitus, hypothyroidism, hyperthyroidism, blood dyscrasia, arthritis, no electrolyte abnormalities, no malignancy/cancer               Abdominal:             Vascular: negative vascular ROS. - PVD, DVT and PE. Other Findings:             Anesthesia Plan      general     ASA 3       Induction: intravenous.     MIPS: Postoperative opioids intended and Prophylactic antiemetics administered. Anesthetic plan and risks discussed with patient. Plan discussed with CRNA.               Narrative & Impression    Sinus bradycardia  Otherwise normal ECG  When compared with ECG of 28-FEB-2020 13:48,  No significant change was found      Specimen Collected: 06/06/22 10:03              Avani Thompson MD   6/14/2022

## 2022-06-15 NOTE — ANESTHESIA POSTPROCEDURE EVALUATION
Department of Anesthesiology  Postprocedure Note    Patient: Louie Emery  MRN: [id number]  YOB: 1960  Date of evaluation: 6/14/2022  Time:  9:01 PM     Procedure Summary     Date: 06/14/22 Room / Location: [address]    Anesthesia Start: 0013 Anesthesia Stop: 8178    Procedure: HOLMIUM LASER,  CYSTOSCOPY,  URETEROSCOPY STENT PLACEMENT (Right ) Diagnosis:       Prostate cancer ([address]      Ureteral calculus, right      (PROSTATE CANCER, URETERAL STONE)    Surgeons: Delicia Ojeda MD Responsible Provider: Jesus Daily MD    Anesthesia Type: general ASA Status: 3          Anesthesia Type: No value filed. Kahlil Phase I: Kahlil Score: 10    Kahlil Phase II: Kahlil Score: 10    Last vitals: Reviewed and per EMR flowsheets.        Anesthesia Post Evaluation    Patient location during evaluation: PACU  Patient participation: complete - patient participated  Level of consciousness: awake and alert  Airway patency: patent  Nausea & Vomiting: no nausea and no vomiting  Complications: no  Cardiovascular status: blood pressure returned to baseline  Respiratory status: acceptable  Hydration status: euvolemic  Comments: No known anesthesia related complication  Multimodal analgesia pain management approach

## 2022-06-16 DIAGNOSIS — N20.1 URETERAL STONE: Primary | ICD-10-CM

## 2022-07-20 ENCOUNTER — HOSPITAL ENCOUNTER (OUTPATIENT)
Dept: GENERAL RADIOLOGY | Age: 62
Discharge: HOME | End: 2022-07-22
Payer: COMMERCIAL

## 2022-07-20 ENCOUNTER — HOSPITAL ENCOUNTER (OUTPATIENT)
Age: 62
Discharge: HOME | End: 2022-07-22
Payer: COMMERCIAL

## 2022-07-20 DIAGNOSIS — N20.1 URETERAL STONE: ICD-10-CM

## 2022-07-20 PROCEDURE — 74018 RADEX ABDOMEN 1 VIEW: CPT

## 2022-07-28 ENCOUNTER — OFFICE VISIT (OUTPATIENT)
Dept: UROLOGY | Age: 62
End: 2022-07-28
Payer: COMMERCIAL

## 2022-07-28 VITALS
SYSTOLIC BLOOD PRESSURE: 128 MMHG | TEMPERATURE: 97.8 F | BODY MASS INDEX: 28.04 KG/M2 | DIASTOLIC BLOOD PRESSURE: 74 MMHG | HEIGHT: 68 IN | WEIGHT: 185 LBS

## 2022-07-28 DIAGNOSIS — N20.0 KIDNEY STONE: Primary | ICD-10-CM

## 2022-07-28 DIAGNOSIS — N39.3 STRESS INCONTINENCE OF URINE: ICD-10-CM

## 2022-07-28 PROCEDURE — 99213 OFFICE O/P EST LOW 20 MIN: CPT | Performed by: UROLOGY

## 2022-07-28 ASSESSMENT — ENCOUNTER SYMPTOMS
BACK PAIN: 0
EYE PAIN: 0
EYE REDNESS: 0
COUGH: 0
NAUSEA: 0
DIARRHEA: 0
WHEEZING: 0
VOMITING: 0
CONSTIPATION: 0
SHORTNESS OF BREATH: 0
ABDOMINAL PAIN: 0

## 2022-07-28 NOTE — PROGRESS NOTES
[address] [address]  Dept: 92 [facility] Urology Office Note - Established    Patient:  Cheyenne Sauceda  YOB: 1960  Date: 7/28/2022    The patient is a 57 y.o. male who presents todayfor evaluation of the following problems:   Chief Complaint   Patient presents with    Check-Up     HLL/URS-KUB        HPI  Here for stones and incontinence. Stone treated, no issues, aus working [employer] of old records: N/A    Additional History: N/A    Procedures Today: N/A    Urinalysis today:  No results found for this visit on 07/28/22. Last several PSA's:  No results found for: PSA  Last total testosterone:  No results found for: TESTOSTERONE    AUA Symptom Score (7/28/2022): Last BUN and creatinine:  Lab Results   Component Value Date    BUN 17 06/06/2022     Lab Results   Component Value Date    CREATININE 0.98 06/06/2022       Additional Lab/Culture results: none    Imaging Reviewed during this Office Visit: none  (results were independently reviewed by physician and radiology report verified)    PAST MEDICAL, FAMILY AND SOCIAL HISTORY UPDATE:  Past Medical History:   Diagnosis Date    Anxiety 06/06/2022    stress from work but now retired    Bunion of left foot     Colon polyps     COVID-19 vaccine administered 3-, 2-    ZMP    Diverticulitis     GERD (gastroesophageal reflux disease)     Hyperlipidemia     Dr. Susana James    Prostate cancer [facility])     prostate    Sleep apnea     C-PAP AT HS    Status post implantation of artificial urinary sphincter 05/20/2021    DR. SEBASTIAN, ACTIVATED ON JULY 1ST. IF PT. EVERY NEEDS TO  BE CATHETERIZED NEEDS DISENGAGEMENT PER. UROLOGIST DR. SEBASTIAN.     Stress incontinence     Ureteral stone     Right    Vitamin D deficiency     Wears prescription eyeglasses     Wellness examination 06/06/2022    Dr. Gen Koenig last seen oct 2021     Past Surgical History:   Procedure Laterality Date    COLONOSCOPY  2012, 2017    COLONOSCOPY N/A 9/17/2021    COLORECTAL CANCER SCREENING, NOT HIGH RISK performed by Jero Lee MD at [place] [address] Right 06/14/2022    HOLMIUM LASER,  CYSTOSCOPY,  URETEROSCOPY STENT PLACEMENT       PROSTATE BIOPSY N/A     2/7/2020    PROSTATECTOMY N/A 3/4/2020    XI ROBOTIC LAPAROSCOPIC PROSTATECTOMY WITH LYMPH NODE DISSECTION performed by Milena Velasco MD at [address] [place] [address]      ARTIFICIAL URINARY SPHINCTER PLACED AND IF PT.-(MEHUL)  EVER NEEDS TO BE CATHETERIZED MUST BE DISENGAGED PER UROLOGIST (DR. Boaz Ruano)    URETER SURGERY Right 6/14/2022    HOLMIUM LASER,  CYSTOSCOPY,  URETEROSCOPY STENT PLACEMENT performed by Milena Velasco MD at [facility] [place] by [facility] History   Problem Relation Age of Onset    Prostate Cancer Brother         bladder and prostate    Colon Cancer Mother         lung    Heart Disease Father     Heart Attack Father     COPD Father         COPD     Outpatient Medications Marked as Taking for the 7/28/22 encounter (Office Visit) with Milena Velasco MD   Medication Sig Dispense Refill    aspirin 81 MG chewable tablet Take 81 mg by mouth daily       sertraline (ZOLOFT) 100 MG tablet Take 100 mg by mouth daily       loratadine (CLARITIN) 10 MG tablet Take 10 mg by mouth daily       omeprazole (PRILOSEC) 20 MG delayed release capsule Take 20 mg by mouth Daily      vitamin D 25 MCG (1000 UT) CAPS Take 1 capsule by mouth daily       fenofibrate 160 MG tablet Take 160 mg by mouth daily      Ascorbic Acid (VITAMIN C) 500 MG tablet Take 500 mg by mouth daily       psyllium 0.52 g capsule Take 0.52 g by mouth daily          Dye [iodides], Sulfamethoxazole, and Ciprofloxacin  Social History     Tobacco Use   Smoking Status Never   Smokeless Tobacco Never     (Ifpatient a smoker, smoking cessation counseling offered)    Social History     Substance and Sexual Activity Alcohol Use No       REVIEW OF SYSTEMS:  Review of Systems    Physical Exam:      Vitals:    07/28/22 0830   BP: 128/74   Temp: 97.8 °F (36.6 °C)     Body mass index is 28.13 kg/m². Patient is a 57 y.o. male in no acute distress and alert and oriented to person, place and time. Physical Exam  Constitutional: Patient in no acute distress. Neuro: Alert and oriented to person, place and time. Psych: Mood normal, affect normal  Skin: No rash noted  HEENT: Head: Normocephalic andatraumatic  Conjunctivae and EOM are normal. Pupils are equal, round  Nose:Normal  Right External Ear: Normal; Left External Ear: Normal  Mouth: Mucosa Moist  Neck: Supple  Lungs: Respiratory effort is normal  Cardiovascular: Warm & Pink  Abdomen: Soft, non-tender, non-distended with no CVA,  No flank tenderness,  Or hepatosplenomegaly   Lymphatics: No palpablelymphadenopathy. Assessment and Plan      1. Kidney stone    2. Stress incontinence of urine           Plan:       Return for Follow up. Prescriptions Ordered:  No orders of the defined types were placed in this encounter. Orders Placed:  Orders Placed This Encounter   Procedures    XR ABDOMEN (KUB) (SINGLE AP VIEW)     Standing Status:   Future     Standing Expiration Date:   7/28/2023           Milena Velasco MD    Agree with the ROS entered by the MA.

## 2022-10-14 ENCOUNTER — TELEPHONE (OUTPATIENT)
Dept: UROLOGY | Age: 62
End: 2022-10-14

## 2022-10-14 DIAGNOSIS — R10.9 FLANK PAIN: Primary | ICD-10-CM

## 2022-10-14 NOTE — TELEPHONE ENCOUNTER
Patient called in and stated \"I had a kidney stone blasted back in June. For the past week I have felt pain on my right side. It's like a shooting pain. I was wondering if there is any imaging I should have done. \"    Writer let patient know that Paula Ross is not in the office.  But, a message is out to his NP

## 2022-10-17 NOTE — TELEPHONE ENCOUNTER
CT stone protocol ordered. Please give him the number to have scheduled.   Please have him f/u in about 1 week (imaging prior) 12:40

## 2022-10-18 NOTE — TELEPHONE ENCOUNTER
Patient was notified. He is scheduled for 10/25/22 to have CT completed.  He will call back to schedule appt

## 2022-10-25 ENCOUNTER — HOSPITAL ENCOUNTER (OUTPATIENT)
Dept: CT IMAGING | Age: 62
Discharge: HOME | End: 2022-10-27
Payer: COMMERCIAL

## 2022-10-25 DIAGNOSIS — R10.9 FLANK PAIN: ICD-10-CM

## 2022-10-25 PROCEDURE — 74176 CT ABD & PELVIS W/O CONTRAST: CPT

## 2022-10-27 ENCOUNTER — OFFICE VISIT (OUTPATIENT)
Dept: UROLOGY | Age: 62
End: 2022-10-27
Payer: COMMERCIAL

## 2022-10-27 VITALS — BODY MASS INDEX: 28.04 KG/M2 | TEMPERATURE: 98 F | HEIGHT: 68 IN | WEIGHT: 185 LBS

## 2022-10-27 DIAGNOSIS — C61 PROSTATE CANCER (HCC): ICD-10-CM

## 2022-10-27 DIAGNOSIS — Z87.442 HISTORY OF KIDNEY STONES: ICD-10-CM

## 2022-10-27 DIAGNOSIS — N39.3 STRESS INCONTINENCE OF URINE: ICD-10-CM

## 2022-10-27 DIAGNOSIS — Z90.79 S/P PROSTATECTOMY: ICD-10-CM

## 2022-10-27 DIAGNOSIS — R10.9 FLANK PAIN: Primary | ICD-10-CM

## 2022-10-27 PROCEDURE — 99214 OFFICE O/P EST MOD 30 MIN: CPT | Performed by: NURSE PRACTITIONER

## 2022-10-27 RX ORDER — NIRMATRELVIR AND RITONAVIR 300-100 MG
KIT ORAL
COMMUNITY
Start: 2022-09-02

## 2022-10-27 ASSESSMENT — ENCOUNTER SYMPTOMS
WHEEZING: 0
NAUSEA: 0
DIARRHEA: 0
EYE PAIN: 0
EYE REDNESS: 0
CONSTIPATION: 0
SHORTNESS OF BREATH: 0
BACK PAIN: 0
COUGH: 0
VOMITING: 0
ABDOMINAL PAIN: 0

## 2022-10-27 NOTE — PROGRESS NOTES
[address] [address]  Dept: 92 [facility] Urology Office Note - Established    Patient:  Conner Franklin  YOB: 1960  Date: 10/27/2022    The patient is a 57 y.o. male who presents todayfor evaluation of the following problems:   Chief Complaint   Patient presents with    Results     Review CT results        HPI  Patient is presenting for f/u right flank pain. Started 10/14/22, lasted 2 weeks, then went away. He denies hematuria, dysuria, n/v, fevers or chills at that time. He has a hx of stones which were treated in the past.  CT was reviewed and negative for obstructive uropathy. Pain has resolved. Hx prostate ca- RRP 3/4/2020, [facility] 7. PSA undetectable  Last PSA was March 2022, <0.01- repeat is due in March  Continues to do well with AUS. Trimix for ED. Summary of old records: N/A    Additional History: N/A    Procedures Today: N/A    Urinalysis today:  No results found for this visit on 10/27/22.   Last several PSA's:  No results found for: PSA  Last total testosterone:  No results found for: TESTOSTERONE    Last BUN and creatinine:  Lab Results   Component Value Date    BUN 17 06/06/2022     Lab Results   Component Value Date    CREATININE 0.98 06/06/2022       Additional Lab/Culture results: none    Imaging Reviewed during this Office Visit: none  (results were independently reviewed by physician and radiology report verified)    PAST MEDICAL, FAMILY AND SOCIAL HISTORY UPDATE:  Past Medical History:   Diagnosis Date    Anxiety 06/06/2022    stress from work but now retired    Bunion of left foot     Colon polyps     COVID-19 vaccine administered 3-, 2-    Aidan Ascencio    Diverticulitis     GERD (gastroesophageal reflux disease)     Hyperlipidemia     Dr. Alberto Camarillo    Prostate cancer [facility])     prostate    Sleep apnea     C-PAP AT HS    Status post implantation of artificial urinary sphincter 05/20/2021    DR. SEBASTIAN, ACTIVATED ON JULY 1ST. IF PT. EVERY NEEDS TO  BE CATHETERIZED NEEDS DISENGAGEMENT PER. UROLOGIST DR. SEBASTIAN.     Stress incontinence     Ureteral stone     Right    Vitamin D deficiency     Wears prescription eyeglasses     Wellness examination 06/06/2022    Dr. Desire Salguero last seen oct 2021     Past Surgical History:   Procedure Laterality Date    COLONOSCOPY  2012, 2017    COLONOSCOPY N/A 9/17/2021    COLORECTAL CANCER SCREENING, NOT HIGH RISK performed by Helen Schwartz MD at [address] Right 06/14/2022    HOLMIUM LASER,  CYSTOSCOPY,  URETEROSCOPY STENT PLACEMENT       PROSTATE BIOPSY N/A     2/7/2020    PROSTATECTOMY N/A 3/4/2020    XI ROBOTIC LAPAROSCOPIC PROSTATECTOMY WITH LYMPH NODE DISSECTION performed by Addie Long MD at [address] [place] [address] IF PT.-(MEHUL)  EVER NEEDS TO BE CATHETERIZED MUST BE DISENGAGED PER UROLOGIST (DR. Mely Bonner)    URETER SURGERY Right 6/14/2022    HOLMIUM LASER,  CYSTOSCOPY,  URETEROSCOPY STENT PLACEMENT performed by Addie Long MD at [address] [facility] History   Problem Relation Age of Onset    Prostate Cancer Brother         bladder and prostate    Colon Cancer Mother         lung    Heart Disease Father     Heart Attack Father     COPD Father         COPD     Outpatient Medications Marked as Taking for the 10/27/22 encounter (Office Visit) with MARTINEZ Young CNP   Medication Sig Dispense Refill    aspirin 81 MG chewable tablet Take 81 mg by mouth daily       sertraline (ZOLOFT) 100 MG tablet Take 100 mg by mouth daily       loratadine (CLARITIN) 10 MG tablet Take 10 mg by mouth daily       omeprazole (PRILOSEC) 20 MG delayed release capsule Take 20 mg by mouth Daily      vitamin D 25 MCG (1000 UT) CAPS Take 1 capsule by mouth daily       fenofibrate 160 MG tablet Take 160 mg by mouth daily      Ascorbic Acid (VITAMIN C) 500 MG tablet Take 500 mg by mouth daily       psyllium 0.52 g capsule Take 0.52 g by mouth daily          Dye [iodides], Sulfamethoxazole, and Ciprofloxacin  Social History     Tobacco Use   Smoking Status Never   Smokeless Tobacco Never     (Ifpatient a smoker, smoking cessation counseling offered)    Social History     Substance and Sexual Activity   Alcohol Use No       REVIEW OF SYSTEMS:  Review of Systems    Physical Exam:      Vitals:    10/27/22 0950   Temp: 98 °F (36.7 °C)     Body mass index is 28.13 kg/m². Patient is a 57 y.o. male in no acute distress and alert and oriented to person, place and time. Physical Exam  Constitutional: Patient in no acute distress. Neuro: Alert and oriented to person, place and time. Psych: Mood normal, affect normal  Lungs: Respiratory effort is normal  Cardiovascular: Warm & Pink  Abdomen: Soft, non-tender, non-distended with no CVA,  No flank tenderness  Bladder non-tender and not distended. Musculoskeletal: Normal gait and station      Assessment and Plan      1. Flank pain    2. Prostate cancer ([address]    3. S/P prostatectomy    4. Stress incontinence of urine    5. History of kidney stones        CT was reviewed with Dr. Pat Corral. No evidence of stone. Pain has resolved- surveillance imaging in March 2023. PSA in March 2023 for PSA. Continue trimix for ED    Return in about 5 months (around 3/27/2023) for PSA, KUB. Prescriptions Ordered:  No orders of the defined types were placed in this encounter. Orders Placed:  No orders of the defined types were placed in this encounter. MARTINEZ Lozada CNP    Reviewed and agree with the ROS entered by the MA.

## 2023-03-23 ENCOUNTER — OFFICE VISIT (OUTPATIENT)
Dept: UROLOGY | Age: 63
End: 2023-03-23
Payer: COMMERCIAL

## 2023-03-23 VITALS
DIASTOLIC BLOOD PRESSURE: 77 MMHG | BODY MASS INDEX: 26.83 KG/M2 | HEIGHT: 68 IN | TEMPERATURE: 98 F | SYSTOLIC BLOOD PRESSURE: 132 MMHG | HEART RATE: 72 BPM | WEIGHT: 177 LBS

## 2023-03-23 DIAGNOSIS — Z90.79 S/P PROSTATECTOMY: ICD-10-CM

## 2023-03-23 DIAGNOSIS — Z87.442 HISTORY OF KIDNEY STONES: ICD-10-CM

## 2023-03-23 DIAGNOSIS — Z85.46 HISTORY OF PROSTATE CANCER: Primary | ICD-10-CM

## 2023-03-23 PROCEDURE — G8484 FLU IMMUNIZE NO ADMIN: HCPCS | Performed by: NURSE PRACTITIONER

## 2023-03-23 PROCEDURE — 99214 OFFICE O/P EST MOD 30 MIN: CPT | Performed by: NURSE PRACTITIONER

## 2023-03-23 PROCEDURE — G8419 CALC BMI OUT NRM PARAM NOF/U: HCPCS | Performed by: NURSE PRACTITIONER

## 2023-03-23 PROCEDURE — G8427 DOCREV CUR MEDS BY ELIG CLIN: HCPCS | Performed by: NURSE PRACTITIONER

## 2023-03-23 PROCEDURE — 3017F COLORECTAL CA SCREEN DOC REV: CPT | Performed by: NURSE PRACTITIONER

## 2023-03-23 PROCEDURE — 1036F TOBACCO NON-USER: CPT | Performed by: NURSE PRACTITIONER

## 2023-03-23 ASSESSMENT — ENCOUNTER SYMPTOMS
EYE PAIN: 0
ABDOMINAL PAIN: 0
COUGH: 0
WHEEZING: 0
CONSTIPATION: 0
NAUSEA: 0
BACK PAIN: 1
VOMITING: 0
SHORTNESS OF BREATH: 0
DIARRHEA: 0
EYE REDNESS: 0

## 2023-03-23 NOTE — PROGRESS NOTES
Review of Systems   Constitutional:  Negative for chills, fatigue and fever. Eyes:  Negative for pain, redness and visual disturbance. Respiratory:  Negative for cough, shortness of breath and wheezing. Cardiovascular:  Negative for chest pain and leg swelling. Gastrointestinal:  Negative for abdominal pain, constipation, diarrhea, nausea and vomiting. Genitourinary:  Negative for difficulty urinating, dysuria, flank pain, frequency, hematuria, scrotal swelling, testicular pain and urgency. Musculoskeletal:  Positive for back pain. Negative for joint swelling and myalgias. Skin:  Negative for rash and wound. Neurological:  Negative for dizziness, tremors, weakness and numbness. Hematological:  Does not bruise/bleed easily.
BE CATHETERIZED MUST BE DISENGAGED PER UROLOGIST (DR. Dequan Reyes)    URETER SURGERY Right 6/14/2022    HOLMIUM LASER,  CYSTOSCOPY,  URETEROSCOPY STENT PLACEMENT performed by Masood Jackson MD at [place] [address]     Family History   Problem Relation Age of Onset    Prostate Cancer Brother         bladder and prostate    Colon Cancer Mother         lung    Heart Disease Father     Heart Attack Father     COPD Father         COPD     No outpatient medications have been marked as taking for the 3/23/23 encounter (Office Visit) with MARTINEZ Gross CNP. Dye [iodides], Sulfamethoxazole, and Ciprofloxacin  Social History     Tobacco Use   Smoking Status Never   Smokeless Tobacco Never     (Ifpatient a smoker, smoking cessation counseling offered)    Social History     Substance and Sexual Activity   Alcohol Use No       REVIEW OF SYSTEMS:  Review of Systems    Physical Exam:      Vitals:    03/23/23 0918   BP: 132/77   Pulse: 72   Temp: 98 °F (36.7 °C)     Body mass index is 26.91 kg/m². Patient is a 57 y.o. male in no acute distress and alert and oriented to person, place and time. Physical Exam  Constitutional: Patient in no acute distress. Neuro: Alert and oriented to person, place and time. Psych: Mood normal, affect normal  Skin: No rash noted  Lungs: Respiratory effort is normal  Cardiovascular: Warm & Pink  Abdomen: Soft, non-tender, non-distended   Bladder non-tender and not distended. Musculoskeletal: Normal gait and station      Assessment and Plan      1. History of prostate cancer    2. S/P prostatectomy    3. History of kidney stones           Plan:     Hx prostate ca with RRP in past.   PSA undetectable, repeat 1 year. AUS working well, continue. Continue trimix PRN. Hx of stones, last upper tract imaging negative. Will repeat KUB 1 year. Return 1 year or sooner if needed. Return in about 1 year (around 3/23/2024) for  PSA and KUB.     Prescriptions Ordered:  No orders of the defined types

## 2024-02-15 ENCOUNTER — HOSPITAL ENCOUNTER (OUTPATIENT)
Dept: GENERAL RADIOLOGY | Age: 64
Discharge: HOME | End: 2024-02-17
Payer: MEDICARE

## 2024-02-15 ENCOUNTER — HOSPITAL ENCOUNTER (OUTPATIENT)
Age: 64
End: 2024-02-15
Payer: MEDICARE

## 2024-02-15 ENCOUNTER — HOSPITAL ENCOUNTER (OUTPATIENT)
Age: 64
Discharge: HOME | End: 2024-02-15
Payer: MEDICARE

## 2024-02-15 ENCOUNTER — HOSPITAL ENCOUNTER (OUTPATIENT)
Age: 64
Discharge: HOME | End: 2024-02-17
Payer: MEDICARE

## 2024-02-15 DIAGNOSIS — Z87.442 HISTORY OF KIDNEY STONES: ICD-10-CM

## 2024-02-15 LAB
25(OH)D3 SERPL-MCNC: 54.9 NG/ML (ref 30–100)
ALT SERPL-CCNC: 15 U/L (ref 5–41)
ANION GAP SERPL CALCULATED.3IONS-SCNC: 10 MMOL/L (ref 9–17)
BUN SERPL-MCNC: 21 MG/DL (ref 8–23)
CALCIUM SERPL-MCNC: 10.2 MG/DL (ref 8.6–10.4)
CHLORIDE SERPL-SCNC: 102 MMOL/L (ref 98–107)
CHOLEST SERPL-MCNC: 180 MG/DL
CHOLESTEROL/HDL RATIO: 3.9
CO2 SERPL-SCNC: 25 MMOL/L (ref 20–31)
CREAT SERPL-MCNC: 1 MG/DL (ref 0.7–1.2)
GFR SERPL CREATININE-BSD FRML MDRD: >60 ML/MIN/1.73M2
GLUCOSE SERPL-MCNC: 123 MG/DL (ref 70–99)
HDLC SERPL-MCNC: 46 MG/DL
LDLC SERPL CALC-MCNC: 114 MG/DL (ref 0–130)
POTASSIUM SERPL-SCNC: 4.2 MMOL/L (ref 3.7–5.3)
PSA SERPL-MCNC: <0.02 NG/ML
PSA SERPL-MCNC: <0.02 NG/ML
SODIUM SERPL-SCNC: 137 MMOL/L (ref 135–144)
TRIGL SERPL-MCNC: 101 MG/DL

## 2024-02-15 PROCEDURE — 82306 VITAMIN D 25 HYDROXY: CPT

## 2024-02-15 PROCEDURE — 36415 COLL VENOUS BLD VENIPUNCTURE: CPT

## 2024-02-15 PROCEDURE — 80061 LIPID PANEL: CPT

## 2024-02-15 PROCEDURE — 84460 ALANINE AMINO (ALT) (SGPT): CPT

## 2024-02-15 PROCEDURE — 84153 ASSAY OF PSA TOTAL: CPT

## 2024-02-15 PROCEDURE — 74018 RADEX ABDOMEN 1 VIEW: CPT

## 2024-02-15 PROCEDURE — 80048 BASIC METABOLIC PNL TOTAL CA: CPT

## 2024-02-15 PROCEDURE — G0103 PSA SCREENING: HCPCS

## 2024-02-15 NOTE — RESULT ENCOUNTER NOTE
Gene,     Your PSA remains undetectable which is great! We will see you 3/7/24 in the office.     [place],   Marielle Waldrop, CNP

## 2024-03-07 ENCOUNTER — OFFICE VISIT (OUTPATIENT)
Dept: UROLOGY | Age: 64
End: 2024-03-07
Payer: MEDICARE

## 2024-03-07 VITALS
BODY MASS INDEX: 26.07 KG/M2 | SYSTOLIC BLOOD PRESSURE: 120 MMHG | DIASTOLIC BLOOD PRESSURE: 74 MMHG | WEIGHT: 172 LBS | TEMPERATURE: 98.1 F | OXYGEN SATURATION: 98 % | HEART RATE: 76 BPM | HEIGHT: 68 IN

## 2024-03-07 DIAGNOSIS — N52.31 ERECTILE DYSFUNCTION AFTER RADICAL PROSTATECTOMY: ICD-10-CM

## 2024-03-07 DIAGNOSIS — N39.3 STRESS INCONTINENCE OF URINE: ICD-10-CM

## 2024-03-07 DIAGNOSIS — Z87.442 HISTORY OF KIDNEY STONES: ICD-10-CM

## 2024-03-07 DIAGNOSIS — Z85.46 HISTORY OF PROSTATE CANCER: Primary | ICD-10-CM

## 2024-03-07 DIAGNOSIS — Z90.79 S/P PROSTATECTOMY: ICD-10-CM

## 2024-03-07 PROCEDURE — G8427 DOCREV CUR MEDS BY ELIG CLIN: HCPCS | Performed by: NURSE PRACTITIONER

## 2024-03-07 PROCEDURE — G8484 FLU IMMUNIZE NO ADMIN: HCPCS | Performed by: NURSE PRACTITIONER

## 2024-03-07 PROCEDURE — 1036F TOBACCO NON-USER: CPT | Performed by: NURSE PRACTITIONER

## 2024-03-07 PROCEDURE — 3017F COLORECTAL CA SCREEN DOC REV: CPT | Performed by: NURSE PRACTITIONER

## 2024-03-07 PROCEDURE — G8419 CALC BMI OUT NRM PARAM NOF/U: HCPCS | Performed by: NURSE PRACTITIONER

## 2024-03-07 PROCEDURE — 1123F ACP DISCUSS/DSCN MKR DOCD: CPT | Performed by: NURSE PRACTITIONER

## 2024-03-07 PROCEDURE — 99214 OFFICE O/P EST MOD 30 MIN: CPT | Performed by: NURSE PRACTITIONER

## 2024-03-07 RX ORDER — LORAZEPAM 1 MG/1
1 TABLET ORAL EVERY 6 HOURS PRN
COMMUNITY
Start: 2024-02-27

## 2024-03-07 RX ORDER — ROSUVASTATIN CALCIUM 10 MG/1
10 TABLET, COATED ORAL DAILY
COMMUNITY
Start: 2024-02-27 | End: 2025-02-26

## 2024-03-07 RX ORDER — TADALAFIL 20 MG/1
20 TABLET ORAL DAILY PRN
Qty: 10 TABLET | Refills: 2 | Status: SHIPPED | OUTPATIENT
Start: 2024-03-07

## 2024-03-07 RX ORDER — IBUPROFEN 800 MG/1
TABLET ORAL
COMMUNITY
Start: 2024-01-23

## 2024-03-07 RX ORDER — ZOLPIDEM TARTRATE 5 MG/1
TABLET ORAL
COMMUNITY
Start: 2024-02-27

## 2024-03-07 ASSESSMENT — ENCOUNTER SYMPTOMS
EYE PAIN: 0
VOMITING: 0
ABDOMINAL PAIN: 0
BACK PAIN: 0
COLOR CHANGE: 0
EYE REDNESS: 0
COUGH: 0
EYES NEGATIVE: 1
GASTROINTESTINAL NEGATIVE: 1
WHEEZING: 0
NAUSEA: 0
RESPIRATORY NEGATIVE: 1
ALLERGIC/IMMUNOLOGIC NEGATIVE: 1
SHORTNESS OF BREATH: 0

## 2024-03-07 NOTE — PROGRESS NOTES
Review of Systems   Constitutional: Negative.  Negative for appetite change, chills and fever.   HENT: Negative.     Eyes: Negative.  Negative for pain, redness and visual disturbance.   Respiratory: Negative.  Negative for cough, shortness of breath and wheezing.    Cardiovascular: Negative.  Negative for chest pain and leg swelling.   Gastrointestinal: Negative.  Negative for abdominal pain, nausea and vomiting.   Endocrine: Negative.    Genitourinary: Negative.  Negative for difficulty urinating, dysuria, flank pain, frequency, hematuria, testicular pain and urgency.   Musculoskeletal: Negative.  Negative for back pain, joint swelling and myalgias.   Skin: Negative.  Negative for color change, rash and wound.   Allergic/Immunologic: Negative.    Neurological: Negative.  Negative for dizziness, tremors, weakness, numbness and headaches.   Hematological: Negative.  Negative for adenopathy. Does not bruise/bleed easily.   Psychiatric/Behavioral: Negative.       
  4. Stress incontinence of urine    5. Erectile dysfunction after radical prostatectomy             Plan:   Hx prostate ca with RRP in past.   PSA remains undetectable, repeat 1 year.   AUS cycling well.   He would like to try tadalafil again.   Recommend reaching out to [facility] regarding trimix.  KUB 1 year for stone hx.  F/u 1 year or sooner if needed.     Return in about 1 year (around 3/7/2025) for PSA and KUB.    Prescriptions Ordered:  Orders Placed This Encounter   Medications    tadalafil (CIALIS) 20 MG tablet     Sig: Take 1 tablet by mouth daily as needed for Erectile Dysfunction     Dispense:  10 tablet     Refill:  2     Please apply [employer] discount card     Orders Placed:  Orders Placed This Encounter   Procedures    XR ABDOMEN (KUB) (SINGLE AP VIEW)     Standing Status:   Future     Standing Expiration Date:   3/7/2025     Order Specific Question:   Reason for exam:     Answer:   hx stones    PSA, Diagnostic     Standing Status:   Future     Standing Expiration Date:   9/7/2025           MARTINEZ Art CNP    Reviewed and agree with the ROS entered by the MA.

## 2024-12-11 NOTE — PROGRESS NOTES
Provider E-Visit time total (minutes): 5    Last Comprehensive Metabolic Panel:  Lab Results   Component Value Date     07/29/2024    POTASSIUM 4.4 07/29/2024    CHLORIDE 106 07/29/2024    CO2 21 (L) 07/29/2024    ANIONGAP 12 07/29/2024     (H) 07/29/2024    BUN 10.8 07/29/2024    CR 0.99 (H) 07/29/2024    GFRESTIMATED 63 07/29/2024    GARY 9.1 07/29/2024              Review of Systems   Constitutional: Negative for appetite change, chills and fatigue. Eyes: Negative for pain, redness and visual disturbance. Respiratory: Negative for cough, shortness of breath and wheezing. Cardiovascular: Negative for chest pain and leg swelling. Gastrointestinal: Negative for abdominal pain, constipation, diarrhea, nausea and vomiting. Genitourinary: Positive for frequency and hematuria. Negative for difficulty urinating, dysuria, flank pain and urgency. Musculoskeletal: Negative for back pain, joint swelling and myalgias. Skin: Negative for rash and wound. Neurological: Negative for dizziness, weakness and numbness. Hematological: Does not bruise/bleed easily. Jearlean Bernheim is a 62 y.o. male being evaluated by a Virtual Visit (video visit) encounter to address concerns as mentioned above. A caregiver was present when appropriate. Due to this being a TeleHealth encounter (During RXERW-10 public health emergency), evaluation of the following organ systems was limited: Vitals/Constitutional/EENT/Resp/CV/GI//MS/Neuro/Skin/Heme-Lymph-Imm. Pursuant to the emergency declaration under the [address] [place] [address], [address] [place] [address] authority and the [employer] Act, this Virtual Visit was conducted with patient's (and/or legal guardian's) consent, to reduce the patient's risk of exposure to COVID-19 and provide necessary medical care. The patient (and/or legal guardian) has also been advised to contact this office for worsening conditions or problems, and seek emergency medical treatment and/or call 911 if deemed necessary. Services were provided through a video synchronous discussion virtually to substitute for in-person clinic visit. Patient and provider were located at their individual homes.     --Rashaad Freeman MA on 4/8/2020 at 10:37 AM    An electronic signature was used to authenticate this note.

## 2025-02-19 ENCOUNTER — HOSPITAL ENCOUNTER (OUTPATIENT)
Age: 65
Discharge: HOME | End: 2025-02-21
Payer: MEDICARE

## 2025-02-19 ENCOUNTER — HOSPITAL ENCOUNTER (OUTPATIENT)
Dept: GENERAL RADIOLOGY | Age: 65
Discharge: HOME | End: 2025-02-21
Payer: MEDICARE

## 2025-02-19 ENCOUNTER — HOSPITAL ENCOUNTER (OUTPATIENT)
Age: 65
Discharge: HOME | End: 2025-02-19
Payer: MEDICARE

## 2025-02-19 DIAGNOSIS — Z85.46 HISTORY OF PROSTATE CANCER: ICD-10-CM

## 2025-02-19 DIAGNOSIS — Z87.442 HISTORY OF KIDNEY STONES: ICD-10-CM

## 2025-02-19 DIAGNOSIS — Z90.79 S/P PROSTATECTOMY: ICD-10-CM

## 2025-02-19 LAB — PSA SERPL-MCNC: <0.03 NG/ML (ref 0–4)

## 2025-02-19 PROCEDURE — 36415 COLL VENOUS BLD VENIPUNCTURE: CPT

## 2025-02-19 PROCEDURE — 74018 RADEX ABDOMEN 1 VIEW: CPT

## 2025-02-19 PROCEDURE — 84153 ASSAY OF PSA TOTAL: CPT

## 2025-03-10 ENCOUNTER — OFFICE VISIT (OUTPATIENT)
Dept: UROLOGY | Age: 65
End: 2025-03-10
Payer: MEDICARE

## 2025-03-10 VITALS
SYSTOLIC BLOOD PRESSURE: 118 MMHG | OXYGEN SATURATION: 98 % | HEIGHT: 68 IN | TEMPERATURE: 97.9 F | BODY MASS INDEX: 25.76 KG/M2 | WEIGHT: 170 LBS | HEART RATE: 79 BPM | DIASTOLIC BLOOD PRESSURE: 74 MMHG

## 2025-03-10 DIAGNOSIS — Z85.46 HISTORY OF PROSTATE CANCER: Primary | ICD-10-CM

## 2025-03-10 DIAGNOSIS — Z90.79 S/P PROSTATECTOMY: ICD-10-CM

## 2025-03-10 DIAGNOSIS — N52.31 ERECTILE DYSFUNCTION AFTER RADICAL PROSTATECTOMY: ICD-10-CM

## 2025-03-10 DIAGNOSIS — Z87.442 HISTORY OF KIDNEY STONES: ICD-10-CM

## 2025-03-10 PROCEDURE — G8427 DOCREV CUR MEDS BY ELIG CLIN: HCPCS | Performed by: NURSE PRACTITIONER

## 2025-03-10 PROCEDURE — 99214 OFFICE O/P EST MOD 30 MIN: CPT | Performed by: NURSE PRACTITIONER

## 2025-03-10 PROCEDURE — G8419 CALC BMI OUT NRM PARAM NOF/U: HCPCS | Performed by: NURSE PRACTITIONER

## 2025-03-10 PROCEDURE — 3017F COLORECTAL CA SCREEN DOC REV: CPT | Performed by: NURSE PRACTITIONER

## 2025-03-10 PROCEDURE — 1159F MED LIST DOCD IN RCRD: CPT | Performed by: NURSE PRACTITIONER

## 2025-03-10 PROCEDURE — 1123F ACP DISCUSS/DSCN MKR DOCD: CPT | Performed by: NURSE PRACTITIONER

## 2025-03-10 PROCEDURE — 1036F TOBACCO NON-USER: CPT | Performed by: NURSE PRACTITIONER

## 2025-03-10 ASSESSMENT — ENCOUNTER SYMPTOMS
VOMITING: 0
COUGH: 0
WHEEZING: 0
NAUSEA: 0
EYE REDNESS: 0
EYE PAIN: 0
SHORTNESS OF BREATH: 0
ABDOMINAL PAIN: 0
BACK PAIN: 0
CONSTIPATION: 0
DIARRHEA: 0

## 2025-03-10 NOTE — PROGRESS NOTES
[facility]  [address]  [facility] [address]  Dept: [phone]    Formerly [facility] Urology Office Note - Established    Patient:  Mauro Sorto  YOB: 1960  Date: 3/10/2025    The patient is a 64 y.o. male who presents todayfor evaluation of the following problems:   Chief Complaint   Patient presents with    Other     1 year KUB and PSA        HPI  Patient is a 63 yo male presenting for yearly follow up  Hx of prostate ca, RALP in 2020.  His postoperative PSAs have been undetectable.    He had ongoing issues with post-op UI despite PFT and eventually had AUS.  AUS is cycling well, rare episodes of TONY.   He continue trimix PRN for ED, he has tried and failed pills in the past.   Hx of kidney stones, last tx was 2022.   KUB is negative.   He denies any hematuria, flank pain, or recent gu infections.   No other gu concerns today    Summary of old records: N/A    Additional History: N/A    Procedures Today: N/A    Urinalysis today:  No results found for this visit on 03/10/25.  Last several PSA's:  Lab Results   Component Value Date    PSA <0.03 02/19/2025    PSA <0.02 02/15/2024    PSA <0.02 02/15/2024     Last total testosterone:  No results found for: \"TESTOSTERONE\"         Last BUN and creatinine:  Lab Results   Component Value Date    BUN 21 02/15/2024     Lab Results   Component Value Date    CREATININE 1.0 02/15/2024       Additional Lab/Culture results: none    Imaging Reviewed during this Office Visit: KUB 2/22/2025  (results were independently reviewed by physician and radiology report verified)    PAST MEDICAL, FAMILY AND SOCIAL HISTORY UPDATE:  Past Medical History:   Diagnosis Date    Anxiety 06/06/2022    stress from work but now retired    Bunion of left foot     Colon polyps     COVID-19 vaccine administered 3-, 2-    Pfizer    Diverticulitis     GERD (gastroesophageal reflux disease)

## 2025-08-04 ENCOUNTER — OFFICE VISIT (OUTPATIENT)
Dept: ORTHOPEDIC SURGERY | Age: 65
End: 2025-08-04
Payer: MEDICARE

## 2025-08-04 VITALS — HEIGHT: 68 IN | WEIGHT: 175 LBS | RESPIRATION RATE: 14 BRPM | BODY MASS INDEX: 26.52 KG/M2

## 2025-08-04 DIAGNOSIS — M25.512 LEFT SHOULDER PAIN, UNSPECIFIED CHRONICITY: Primary | ICD-10-CM

## 2025-08-04 DIAGNOSIS — R93.7 ABNORMAL FINDINGS ON DIAGNOSTIC IMAGING OF MUSCULOSKELETAL SYSTEM: ICD-10-CM

## 2025-08-04 DIAGNOSIS — M75.102 TEAR OF LEFT ROTATOR CUFF, UNSPECIFIED TEAR EXTENT, UNSPECIFIED WHETHER TRAUMATIC: ICD-10-CM

## 2025-08-04 PROCEDURE — G8419 CALC BMI OUT NRM PARAM NOF/U: HCPCS | Performed by: ORTHOPAEDIC SURGERY

## 2025-08-04 PROCEDURE — 1123F ACP DISCUSS/DSCN MKR DOCD: CPT | Performed by: ORTHOPAEDIC SURGERY

## 2025-08-04 PROCEDURE — G8428 CUR MEDS NOT DOCUMENT: HCPCS | Performed by: ORTHOPAEDIC SURGERY

## 2025-08-04 PROCEDURE — 1125F AMNT PAIN NOTED PAIN PRSNT: CPT | Performed by: ORTHOPAEDIC SURGERY

## 2025-08-04 PROCEDURE — 99204 OFFICE O/P NEW MOD 45 MIN: CPT | Performed by: ORTHOPAEDIC SURGERY

## 2025-08-04 PROCEDURE — 3017F COLORECTAL CA SCREEN DOC REV: CPT | Performed by: ORTHOPAEDIC SURGERY

## 2025-08-04 PROCEDURE — 1036F TOBACCO NON-USER: CPT | Performed by: ORTHOPAEDIC SURGERY

## (undated) DEVICE — SUTURE MCRYL SZ 3-0 L27IN ABSRB VLT L17MM RB-1 1/2 CIR Y305H

## (undated) DEVICE — GLOVE ORANGE PI 7 1/2   MSG9075

## (undated) DEVICE — GLOVE ORTHO 7 1/2   MSG9475

## (undated) DEVICE — Device

## (undated) DEVICE — 3M™ IOBAN™ 2 ANTIMICROBIAL INCISE DRAPE 6650EZ: Brand: IOBAN™ 2

## (undated) DEVICE — ELECTRO LUBE IS A SINGLE PATIENT USE DEVICE THAT IS INTENDED TO BE USED ON ELECTROSURGICAL ELECTRODES TO REDUCE STICKING.: Brand: KEY SURGICAL ELECTRO LUBE

## (undated) DEVICE — PROTECTOR ULN NRV PUR FOAM HK LOOP STRP ANATOMICALLY

## (undated) DEVICE — SEALANT HEMSTAT 5ML HUM FIBRIN THROM 2 VI APPL DEV EVICEL

## (undated) DEVICE — DEFENDO AIR WATER SUCTION AND BIOPSY VALVE KIT FOR  OLYMPUS: Brand: DEFENDO AIR/WATER/SUCTION AND BIOPSY VALVE

## (undated) DEVICE — SINGLE ACTION PUMPING SYSTEM

## (undated) DEVICE — GUIDEWIRE VASC ANGLED 035X150 STIFF ZIPWIRE

## (undated) DEVICE — GARMENT COMPR STD FOR 17IN CALF UNIF THER FLOTRN

## (undated) DEVICE — CATHETER URET 5FR L70CM OPN END SGL LUMN INJ HUB FLEXIMA

## (undated) DEVICE — FIBER LSR FLEXIVA PULSE TRACTIP 242 BOX

## (undated) DEVICE — NITINOL STONE RETRIEVAL BASKET: Brand: ZERO TIP

## (undated) DEVICE — GARMENT,MEDLINE,DVT,INT,CALF,MED, GEN2: Brand: MEDLINE

## (undated) DEVICE — DUAL LUMEN URETERAL CATHETER

## (undated) DEVICE — TIP APPL L45CM FLX FOR SEAL EVICEL

## (undated) DEVICE — ADAPTER URO SCP UROLOK LL

## (undated) DEVICE — ADHESIVE SKIN CLSR 0.7ML TOP DERMBND ADV

## (undated) DEVICE — CONTAINER,SPECIMEN,4OZ,OR STRL: Brand: MEDLINE

## (undated) DEVICE — CONNECTOR,TUBING,5-IN-1,NON-STERILE: Brand: MEDLINE INDUSTRIES, INC.

## (undated) DEVICE — 40580 - THE PINK PAD - ADVANCED TRENDELENBURG POSITIONING KIT: Brand: 40580 - THE PINK PAD - ADVANCED TRENDELENBURG POSITIONING KIT

## (undated) DEVICE — GLOVE ORANGE PI 8   MSG9080

## (undated) DEVICE — POSITIONER,HEAD,MULTIRING,36CS: Brand: MEDLINE

## (undated) DEVICE — DRAPE,REIN 53X77,STERILE: Brand: MEDLINE

## (undated) DEVICE — STRAP,CATHETER,ELASTIC,HOOK&LOOP: Brand: MEDLINE

## (undated) DEVICE — GUIDEWIRE URO L150CM DIA0.035IN STIFF NIT HYDRPHLC STR TIP

## (undated) DEVICE — COUNTER NDL 10 COUNT HLD 20 FOAM BLK SGL MAG

## (undated) DEVICE — DRAINBAG,ANTI-REFLUX TOWER,L/F,2000ML,LL: Brand: MEDLINE

## (undated) DEVICE — CATHETER URETH 18FR BLLN 30CC 2 W F INF CTRL BARDX

## (undated) DEVICE — TOWEL,OR,DSP,ST,NATURAL,DLX,4/PK,20PK/CS: Brand: MEDLINE

## (undated) DEVICE — BLADELESS OBTURATOR: Brand: WECK VISTA

## (undated) DEVICE — SOLUTION ANTIFOG VIS SYS CLEARIFY LAPSCP

## (undated) DEVICE — AIRSEAL 12 MM ACCESS PORT AND PALM GRIP OBTURATOR WITH BLADELESS OPTICAL TIP, 120 MM LENGTH: Brand: AIRSEAL

## (undated) DEVICE — ARM DRAPE

## (undated) DEVICE — ENDO KIT W/SYRINGE: Brand: MEDLINE INDUSTRIES, INC.

## (undated) DEVICE — SUTURE VCRL SZ 1 L18IN ABSRB VLT CT-1 L36MM 1/2 CIR J741D

## (undated) DEVICE — PACK PROCEDURE SURG CYSTO SVMMC LF

## (undated) DEVICE — TIP COVER ACCESSORY

## (undated) DEVICE — Z DISCONTINUED USE 2717540 SUTURE ABSORBABLE MONOFILAMENT 3-0 RB 1 6 IN STRATAFIX SPRL

## (undated) DEVICE — CANNULA SEAL

## (undated) DEVICE — METER,URINE,400ML,DRAIN BAG,L/F,LL: Brand: MEDLINE

## (undated) DEVICE — STRIP,CLOSURE,WOUND,MEDI-STRIP,1/2X4: Brand: MEDLINE

## (undated) DEVICE — SUTURE V-LOC 180 SZ 0 L9IN ABSRB GRN GS-21 L37MM 1/2 CIR VLOCL0346

## (undated) DEVICE — 3M™ STERI-STRIP™ COMPOUND BENZOIN TINCTURE 40 BAGS/CARTON 4 CARTONS/CASE C1544: Brand: 3M™ STERI-STRIP™

## (undated) DEVICE — CHLORAPREP 26ML ORANGE

## (undated) DEVICE — TRI-LUMEN FILTERED TUBE SET WITH ACTIVATED CHARCOAL FILTER: Brand: AIRSEAL